# Patient Record
Sex: MALE | Race: WHITE | NOT HISPANIC OR LATINO | Employment: FULL TIME | URBAN - METROPOLITAN AREA
[De-identification: names, ages, dates, MRNs, and addresses within clinical notes are randomized per-mention and may not be internally consistent; named-entity substitution may affect disease eponyms.]

---

## 2017-04-01 DIAGNOSIS — E78.5 HYPERLIPIDEMIA: ICD-10-CM

## 2017-04-01 RX ORDER — ATORVASTATIN CALCIUM 20 MG/1
TABLET, FILM COATED ORAL
Qty: 90 TABLET | Refills: 3 | Status: SHIPPED | OUTPATIENT
Start: 2017-04-01 | End: 2018-04-22 | Stop reason: SDUPTHER

## 2018-04-20 DIAGNOSIS — E78.5 HYPERLIPIDEMIA: ICD-10-CM

## 2018-04-22 RX ORDER — ATORVASTATIN CALCIUM 20 MG/1
TABLET, FILM COATED ORAL
Qty: 90 TABLET | Refills: 2 | Status: SHIPPED | OUTPATIENT
Start: 2018-04-22 | End: 2019-09-19 | Stop reason: SDUPTHER

## 2018-09-13 DIAGNOSIS — I10 ESSENTIAL HYPERTENSION: ICD-10-CM

## 2018-09-13 RX ORDER — AMLODIPINE BESYLATE 10 MG/1
TABLET ORAL
Qty: 30 TABLET | Refills: 11 | OUTPATIENT
Start: 2018-09-13

## 2018-10-24 ENCOUNTER — OFFICE VISIT (OUTPATIENT)
Dept: FAMILY MEDICINE | Facility: CLINIC | Age: 58
End: 2018-10-24
Payer: COMMERCIAL

## 2018-10-24 ENCOUNTER — LAB VISIT (OUTPATIENT)
Dept: LAB | Facility: HOSPITAL | Age: 58
End: 2018-10-24
Attending: NURSE PRACTITIONER
Payer: COMMERCIAL

## 2018-10-24 VITALS
TEMPERATURE: 98 F | DIASTOLIC BLOOD PRESSURE: 104 MMHG | WEIGHT: 233.94 LBS | HEIGHT: 73 IN | BODY MASS INDEX: 31 KG/M2 | HEART RATE: 89 BPM | SYSTOLIC BLOOD PRESSURE: 164 MMHG

## 2018-10-24 DIAGNOSIS — E78.5 HYPERLIPIDEMIA, UNSPECIFIED HYPERLIPIDEMIA TYPE: ICD-10-CM

## 2018-10-24 DIAGNOSIS — I10 ESSENTIAL HYPERTENSION: ICD-10-CM

## 2018-10-24 DIAGNOSIS — J45.30 MILD PERSISTENT ASTHMA WITHOUT COMPLICATION: ICD-10-CM

## 2018-10-24 DIAGNOSIS — E66.9 CLASS 1 OBESITY WITH BODY MASS INDEX (BMI) OF 30.0 TO 30.9 IN ADULT, UNSPECIFIED OBESITY TYPE, UNSPECIFIED WHETHER SERIOUS COMORBIDITY PRESENT: ICD-10-CM

## 2018-10-24 DIAGNOSIS — Z00.00 ANNUAL PHYSICAL EXAM: Primary | ICD-10-CM

## 2018-10-24 LAB
ALBUMIN SERPL BCP-MCNC: 4 G/DL
ALP SERPL-CCNC: 99 U/L
ALT SERPL W/O P-5'-P-CCNC: 28 U/L
ANION GAP SERPL CALC-SCNC: 10 MMOL/L
AST SERPL-CCNC: 22 U/L
BASOPHILS # BLD AUTO: 0.09 K/UL
BASOPHILS NFR BLD: 0.9 %
BILIRUB SERPL-MCNC: 1.1 MG/DL
BUN SERPL-MCNC: 15 MG/DL
CALCIUM SERPL-MCNC: 9.8 MG/DL
CHLORIDE SERPL-SCNC: 103 MMOL/L
CHOLEST SERPL-MCNC: 216 MG/DL
CHOLEST/HDLC SERPL: 4.7 {RATIO}
CO2 SERPL-SCNC: 27 MMOL/L
CREAT SERPL-MCNC: 0.9 MG/DL
DIFFERENTIAL METHOD: NORMAL
EOSINOPHIL # BLD AUTO: 0.1 K/UL
EOSINOPHIL NFR BLD: 1.3 %
ERYTHROCYTE [DISTWIDTH] IN BLOOD BY AUTOMATED COUNT: 12.4 %
EST. GFR  (AFRICAN AMERICAN): >60 ML/MIN/1.73 M^2
EST. GFR  (NON AFRICAN AMERICAN): >60 ML/MIN/1.73 M^2
GLUCOSE SERPL-MCNC: 106 MG/DL
HCT VFR BLD AUTO: 48.8 %
HDLC SERPL-MCNC: 46 MG/DL
HDLC SERPL: 21.3 %
HGB BLD-MCNC: 16.5 G/DL
IMM GRANULOCYTES # BLD AUTO: 0.03 K/UL
IMM GRANULOCYTES NFR BLD AUTO: 0.3 %
LDLC SERPL CALC-MCNC: 133.8 MG/DL
LYMPHOCYTES # BLD AUTO: 1.9 K/UL
LYMPHOCYTES NFR BLD: 18.6 %
MCH RBC QN AUTO: 30.9 PG
MCHC RBC AUTO-ENTMCNC: 33.8 G/DL
MCV RBC AUTO: 91 FL
MONOCYTES # BLD AUTO: 0.7 K/UL
MONOCYTES NFR BLD: 6.8 %
NEUTROPHILS # BLD AUTO: 7.3 K/UL
NEUTROPHILS NFR BLD: 72.1 %
NONHDLC SERPL-MCNC: 170 MG/DL
NRBC BLD-RTO: 0 /100 WBC
PLATELET # BLD AUTO: 311 K/UL
PMV BLD AUTO: 10.3 FL
POTASSIUM SERPL-SCNC: 3.7 MMOL/L
PROT SERPL-MCNC: 7.5 G/DL
RBC # BLD AUTO: 5.34 M/UL
SODIUM SERPL-SCNC: 140 MMOL/L
TRIGL SERPL-MCNC: 181 MG/DL
WBC # BLD AUTO: 10.12 K/UL

## 2018-10-24 PROCEDURE — 90686 IIV4 VACC NO PRSV 0.5 ML IM: CPT | Mod: S$GLB,,, | Performed by: NURSE PRACTITIONER

## 2018-10-24 PROCEDURE — 99999 PR PBB SHADOW E&M-EST. PATIENT-LVL IV: CPT | Mod: PBBFAC,,, | Performed by: NURSE PRACTITIONER

## 2018-10-24 PROCEDURE — 90471 IMMUNIZATION ADMIN: CPT | Mod: S$GLB,,, | Performed by: NURSE PRACTITIONER

## 2018-10-24 PROCEDURE — 80061 LIPID PANEL: CPT

## 2018-10-24 PROCEDURE — 99396 PREV VISIT EST AGE 40-64: CPT | Mod: 25,S$GLB,, | Performed by: NURSE PRACTITIONER

## 2018-10-24 PROCEDURE — 80053 COMPREHEN METABOLIC PANEL: CPT

## 2018-10-24 PROCEDURE — 3077F SYST BP >= 140 MM HG: CPT | Mod: CPTII,S$GLB,, | Performed by: NURSE PRACTITIONER

## 2018-10-24 PROCEDURE — 85025 COMPLETE CBC W/AUTO DIFF WBC: CPT

## 2018-10-24 PROCEDURE — 3080F DIAST BP >= 90 MM HG: CPT | Mod: CPTII,S$GLB,, | Performed by: NURSE PRACTITIONER

## 2018-10-24 PROCEDURE — 36415 COLL VENOUS BLD VENIPUNCTURE: CPT | Mod: PO

## 2018-10-24 RX ORDER — AMLODIPINE BESYLATE 10 MG/1
TABLET ORAL
Qty: 30 TABLET | Refills: 5 | Status: SHIPPED | OUTPATIENT
Start: 2018-10-24 | End: 2021-06-07

## 2018-10-24 NOTE — PATIENT INSTRUCTIONS

## 2018-10-24 NOTE — PROGRESS NOTES
Subjective:       Patient ID: Jaden Greene is a 58 y.o. male.    Chief Complaint: Annual Exam    Mr. Greene presents to the clinic today for annual exam.  He has history of hypertension and has been out of amlodipine for one week.  He is not eating a low sodium diet or exercising.  He is obese, has lost 7 lb since last visit.  He denies headaches.  In the past has had chest pain, stabbing, to right side of chest lasting 2 minutes but none recently. No shortness of breath.  He has history of asthma which is well controlled.  He is due for flu shot.      Review of Systems   Constitutional: Negative for chills and fever.   HENT: Negative for congestion, ear pain and sinus pressure.    Eyes: Negative for visual disturbance.   Respiratory: Negative for cough, shortness of breath and wheezing.    Cardiovascular: Positive for chest pain (atypical, none recently). Negative for palpitations and leg swelling.   Gastrointestinal: Negative for abdominal pain, constipation and diarrhea.   Neurological: Negative for dizziness and headaches.   Psychiatric/Behavioral: Negative for dysphoric mood. The patient is nervous/anxious (occasional).        Objective:      Physical Exam   Constitutional: He is oriented to person, place, and time. He appears well-developed and well-nourished. No distress.   HENT:   Head: Normocephalic and atraumatic.   Right Ear: External ear normal.   Left Ear: External ear normal.   Mouth/Throat: Oropharynx is clear and moist. No oropharyngeal exudate.   Eyes: Pupils are equal, round, and reactive to light. Right eye exhibits no discharge. Left eye exhibits no discharge.   Neck: Neck supple. No thyromegaly present.   Cardiovascular: Normal rate and regular rhythm. Exam reveals no gallop and no friction rub.   No murmur heard.  Pulmonary/Chest: Effort normal and breath sounds normal. No respiratory distress. He has no wheezes. He has no rales.   Abdominal: Soft. He exhibits no distension. There is no  tenderness.   Lymphadenopathy:     He has no cervical adenopathy.   Neurological: He is alert and oriented to person, place, and time. Coordination normal.   Skin: Skin is warm and dry.   Psychiatric: He has a normal mood and affect. His behavior is normal. Thought content normal.   Vitals reviewed.          Current Outpatient Medications:     albuterol (PROAIR HFA) 90 mcg/actuation inhaler, Inhale 2 puffs into the lungs every 4 (four) hours as needed. PRN, Disp: 1 Inhaler, Rfl: 11    amLODIPine (NORVASC) 10 MG tablet, Start if blood pressure over 140 or over 95., Disp: 30 tablet, Rfl: 5    atorvastatin (LIPITOR) 20 MG tablet, TAKE ONE TABLET BY MOUTH ONCE DAILY, Disp: 90 tablet, Rfl: 2    fluticasone-vilanterol (BREO ELLIPTA) 200-25 mcg/dose DsDv diskus inhaler, Inhale 1 puff into the lungs once daily., Disp: 1 each, Rfl: 11    montelukast (SINGULAIR) 10 mg tablet, TAKE ONE TABLET BY MOUTH DAILY IN THE EVENING, Disp: 30 tablet, Rfl: 11    omeprazole (PRILOSEC) 40 MG capsule, Take 1 capsule (40 mg total) by mouth once daily., Disp: 30 capsule, Rfl: 11    benzonatate (TESSALON) 100 MG capsule, Take 100 mg by mouth 3 (three) times daily as needed for Cough., Disp: , Rfl:     dextromethorphan-guaifenesin  mg (MUCINEX DM)  mg per 12 hr tablet, Take 1 tablet by mouth 2 (two) times daily., Disp: , Rfl:     hydrocodone-acetaminophen 5-325mg (NORCO) 5-325 mg per tablet, Take 1 tablet by mouth every 4 (four) hours as needed (cough)., Disp: 100 tablet, Rfl: 0  Assessment:       1. Annual physical exam    2. Essential hypertension    3. Mild persistent asthma without complication    4. Hyperlipidemia, unspecified hyperlipidemia type    5. Class 1 obesity with body mass index (BMI) of 30.0 to 30.9 in adult, unspecified obesity type, unspecified whether serious comorbidity present        Plan:       Annual physical exam  Fasting labs.    Essential hypertension  Uncontrolled due to being out of  medication.  Walk for 30 min three times weekly.  DASH diet.  Handout given.  Resume amlodipine, RTC 2 weeks with me, 6 mos with PCP.  -     CBC auto differential; Future; Expected date: 10/24/2018  -     Comprehensive metabolic panel; Future; Expected date: 10/24/2018  -     amLODIPine (NORVASC) 10 MG tablet; Start if blood pressure over 140 or over 95.  Dispense: 30 tablet; Refill: 5    Mild persistent asthma without complication  Stable, continue current medication.  -     CBC auto differential; Future; Expected date: 10/24/2018    Hyperlipidemia, unspecified hyperlipidemia type  Due for lab.  -     CBC auto differential; Future; Expected date: 10/24/2018  -     Lipid panel; Future; Expected date: 10/24/2018    Obesity  See below.    Patient readiness: acceptance and barriers:none    During the course of the visit the patient was educated and counseled about the following:     Hypertension:   Medication: no change.  Dietary sodium restriction.  Regular aerobic exercise.  Obesity:   Diet interventions: moderate (500 kCal/d) deficit diet and qualitative changes (increase low-fat,  high-fiber foods).  Regular aerobic exercise program discussed.    Goals: Hypertension: Reduce Blood Pressure and Obesity: Reduce calorie intake and BMI    Did patient meet goals/outcomes: No    The following self management tools provided: declined    Patient Instructions (the written plan) was given to the patient/family.     Time spent with patient: 30 minutes    Barriers to medications present (no )    Adverse reactions to current medications (no)    Over the counter medications reviewed (No)

## 2018-10-26 ENCOUNTER — TELEPHONE (OUTPATIENT)
Dept: FAMILY MEDICINE | Facility: CLINIC | Age: 58
End: 2018-10-26

## 2018-10-26 NOTE — TELEPHONE ENCOUNTER
----- Message from Erin Estevez sent at 10/26/2018 12:47 PM CDT -----  Contact: Patient  Type:  Patient Returning Call    Who Called:  Patient  Who Left Message for Patient:    Does the patient know what this is regarding?:  Results  Best Call Back Number:    Additional Information:

## 2018-11-07 ENCOUNTER — OFFICE VISIT (OUTPATIENT)
Dept: FAMILY MEDICINE | Facility: CLINIC | Age: 58
End: 2018-11-07
Payer: COMMERCIAL

## 2018-11-07 VITALS
HEIGHT: 73 IN | TEMPERATURE: 98 F | SYSTOLIC BLOOD PRESSURE: 130 MMHG | HEART RATE: 87 BPM | WEIGHT: 241.19 LBS | DIASTOLIC BLOOD PRESSURE: 90 MMHG | BODY MASS INDEX: 31.96 KG/M2

## 2018-11-07 DIAGNOSIS — J45.30 MILD PERSISTENT ASTHMA WITHOUT COMPLICATION: ICD-10-CM

## 2018-11-07 DIAGNOSIS — I10 ESSENTIAL HYPERTENSION: Primary | ICD-10-CM

## 2018-11-07 DIAGNOSIS — E66.9 CLASS 1 OBESITY WITH BODY MASS INDEX (BMI) OF 30.0 TO 30.9 IN ADULT, UNSPECIFIED OBESITY TYPE, UNSPECIFIED WHETHER SERIOUS COMORBIDITY PRESENT: ICD-10-CM

## 2018-11-07 DIAGNOSIS — F41.9 ANXIETY: ICD-10-CM

## 2018-11-07 PROCEDURE — 99999 PR PBB SHADOW E&M-EST. PATIENT-LVL IV: CPT | Mod: PBBFAC,,, | Performed by: NURSE PRACTITIONER

## 2018-11-07 PROCEDURE — 99214 OFFICE O/P EST MOD 30 MIN: CPT | Mod: S$GLB,,, | Performed by: NURSE PRACTITIONER

## 2018-11-07 PROCEDURE — 3080F DIAST BP >= 90 MM HG: CPT | Mod: CPTII,S$GLB,, | Performed by: NURSE PRACTITIONER

## 2018-11-07 PROCEDURE — 3008F BODY MASS INDEX DOCD: CPT | Mod: CPTII,S$GLB,, | Performed by: NURSE PRACTITIONER

## 2018-11-07 PROCEDURE — 3075F SYST BP GE 130 - 139MM HG: CPT | Mod: CPTII,S$GLB,, | Performed by: NURSE PRACTITIONER

## 2018-11-07 RX ORDER — MONTELUKAST SODIUM 10 MG/1
10 TABLET ORAL NIGHTLY
Qty: 30 TABLET | Refills: 11 | Status: SHIPPED | OUTPATIENT
Start: 2018-11-07 | End: 2018-11-29 | Stop reason: SDUPTHER

## 2018-11-07 RX ORDER — CHLORTHALIDONE 25 MG/1
25 TABLET ORAL DAILY
Qty: 30 TABLET | Refills: 11 | Status: SHIPPED | OUTPATIENT
Start: 2018-11-07 | End: 2019-12-09 | Stop reason: SDUPTHER

## 2018-11-07 NOTE — PROGRESS NOTES
Subjective:       Patient ID: Jaden Greene is a 58 y.o. male.    Chief Complaint: Hypertension (2 week)    Mr. Greene presents to the clinic today for follow up for hypertension.  Blood pressure is not at goal and he has not been checking BP at home due to no cuff.  He is taking medication as prescribed.  Does not eat a low sodium diet.  He gets occasional anxiety (once per week) which lasts 10-15 minutes and is resolved with thinking about something else.  No panic attack symptoms.      Review of Systems   Constitutional: Negative for chills and fever.   Respiratory: Negative for cough and shortness of breath.    Cardiovascular: Negative for chest pain, palpitations and leg swelling.   Neurological: Negative for dizziness and headaches.   Psychiatric/Behavioral: The patient is nervous/anxious (occasional, once per week, lasts 10 min).        Objective:      Physical Exam   Constitutional: He is oriented to person, place, and time. He appears well-developed and well-nourished. No distress.   HENT:   Head: Normocephalic and atraumatic.   Right Ear: External ear normal.   Left Ear: External ear normal.   Eyes: Right eye exhibits no discharge. Left eye exhibits no discharge.   Neck: Neck supple. No thyromegaly present.   Cardiovascular: Normal rate and regular rhythm. Exam reveals no gallop and no friction rub.   No murmur heard.  Pulmonary/Chest: Effort normal and breath sounds normal. No respiratory distress. He has no wheezes. He has no rales.   Lymphadenopathy:     He has no cervical adenopathy.   Neurological: He is alert and oriented to person, place, and time. Coordination normal.   Skin: Skin is warm and dry.   Psychiatric: He has a normal mood and affect. His behavior is normal. Thought content normal.   Vitals reviewed.          Current Outpatient Medications:     albuterol (PROAIR HFA) 90 mcg/actuation inhaler, Inhale 2 puffs into the lungs every 4 (four) hours as needed. PRN, Disp: 1 Inhaler, Rfl:  11    amLODIPine (NORVASC) 10 MG tablet, Start if blood pressure over 140 or over 95., Disp: 30 tablet, Rfl: 5    atorvastatin (LIPITOR) 20 MG tablet, TAKE ONE TABLET BY MOUTH ONCE DAILY, Disp: 90 tablet, Rfl: 2    benzonatate (TESSALON) 100 MG capsule, Take 100 mg by mouth 3 (three) times daily as needed for Cough., Disp: , Rfl:     dextromethorphan-guaifenesin  mg (MUCINEX DM)  mg per 12 hr tablet, Take 1 tablet by mouth 2 (two) times daily., Disp: , Rfl:     fluticasone-vilanterol (BREO ELLIPTA) 200-25 mcg/dose DsDv diskus inhaler, Inhale 1 puff into the lungs once daily., Disp: 1 each, Rfl: 11    hydrocodone-acetaminophen 5-325mg (NORCO) 5-325 mg per tablet, Take 1 tablet by mouth every 4 (four) hours as needed (cough)., Disp: 100 tablet, Rfl: 0    montelukast (SINGULAIR) 10 mg tablet, Take 1 tablet (10 mg total) by mouth every evening., Disp: 30 tablet, Rfl: 11    chlorthalidone (HYGROTEN) 25 MG Tab, Take 1 tablet (25 mg total) by mouth once daily., Disp: 30 tablet, Rfl: 11    omeprazole (PRILOSEC) 40 MG capsule, Take 1 capsule (40 mg total) by mouth once daily., Disp: 30 capsule, Rfl: 11  Assessment:       1. Essential hypertension    2. Mild persistent asthma without complication    3. Class 1 obesity with body mass index (BMI) of 30.0 to 30.9 in adult, unspecified obesity type, unspecified whether serious comorbidity present    4. Anxiety        Plan:       Essential hypertension  Get BP cuff.  Add:  -     chlorthalidone (HYGROTEN) 25 MG Tab; Take 1 tablet (25 mg total) by mouth once daily.  Dispense: 30 tablet; Refill: 11  DASH diet, weight loss.  RTC 1 month.    Mild persistent asthma without complication  Stable, needs refill.  -     montelukast (SINGULAIR) 10 mg tablet; Take 1 tablet (10 mg total) by mouth every evening.  Dispense: 30 tablet; Refill: 11    Class 1 obesity with body mass index (BMI) of 30.0 to 30.9 in adult, unspecified obesity type, unspecified whether serious  comorbidity present  See below.    Anxiety  This is occasional and mild.  Meditation and deep breathing exercises can help.  Download Calm or Headspace alejandro.     Patient readiness: acceptance and barriers:none    During the course of the visit the patient was educated and counseled about the following:     Hypertension:   Medication: begin chlorthalidone.  Dietary sodium restriction.  Regular aerobic exercise.  Obesity:   Diet interventions: qualitative changes (increase low-fat,  high-fiber foods).  Regular aerobic exercise program discussed.    Goals: Hypertension: Reduce Blood Pressure and Obesity: Reduce calorie intake and BMI    Did patient meet goals/outcomes: No    The following self management tools provided: declined    Patient Instructions (the written plan) was given to the patient/family.     Time spent with patient: 15 minutes    Barriers to medications present (no )    Adverse reactions to current medications (no)    Over the counter medications reviewed (No)

## 2018-11-07 NOTE — PATIENT INSTRUCTIONS
Apps to try for anxiety:  Headspace and Calm      Low-Salt Diet  This diet removes foods that are high in salt. It also limits the amount of salt you use when cooking. It is most often used for people with high blood pressure, edema (fluid retention), and kidney, liver, or heart disease.  Table salt contains the mineral sodium. Your body needs sodium to work normally. But too much sodium can make your health problems worse. Your healthcare provider is recommending a low-salt (also called low-sodium) diet for you. Your total daily allowance of salt is 1,500 to 2,300 milligrams (mg). It is less than 1 teaspoon of table salt. This means you can have only about 500 to 700 mg of sodium at each meal. People with certain health problems should limit salt intake to the lower end of the recommended range.    When you cook, dont add much salt. If you can cook without using salt, even better. Dont add salt to your food at the table.  When shopping, read food labels. Salt is often called sodium on the label. Choose foods that are salt-free, low salt, or very low salt. Note that foods with reduced salt may not lower your salt intake enough.    Beans, potatoes, and pasta  Ok: Dry beans, split peas, lentils, potatoes, rice, macaroni, pasta, spaghetti without added salt  Avoid: Potato chips, tortilla chips, and similar products  Breads and cereals  Ok: Low-sodium breads, rolls, cereals, and cakes; low-salt crackers, matzo crackers  Avoid: Salted crackers, pretzels, popcorn, Armenian toast, pancakes, muffins  Dairy  Ok: Milk, chocolate milk, hot chocolate mix, low-salt cheeses, and yogurt  Avoid: Processed cheese and cheese spreads; Roquefort, Camembert, and cottage cheese; buttermilk, instant breakfast drink  Desserts  Ok: Ice cream, frozen yogurt, juice bars, gelatin, cookies and pies, sugar, honey, jelly, hard candy  Avoid: Most pies, cakes and cookies prepared or processed with salt; instant pudding  Drinks  Ok: Tea, coffee,  fizzy (carbonated) drinks, juices  Avoid: Flavored coffees, electrolyte replacement drinks, sports drinks  Meats  Ok: All fresh meat, fish, poultry, low-salt tuna, eggs, egg substitute  Avoid: Smoked, pickled, brine-cured, or salted meats and fish. This includes barrera, chipped beef, corned beef, hot dogs, deli meats, ham, kosher meats, salt pork, sausage, canned tuna, salted codfish, smoked salmon, herring, sardines, or anchovies.  Seasonings and spices  Ok: Most seasonings are okay. Good substitutes for salt include: fresh herb blends, hot sauce, lemon, garlic, chilel, vinegar, dry mustard, parsley, cilantro, horseradish, tomato paste, regular margarine, mayonnaise, unsalted butter, cream cheese, vegetable oil, cream, low-salt salad dressing and gravy.  Avoid: Regular ketchup, relishes, pickles, soy sauce, teriyaki sauce, Worcestershire sauce, BBQ sauce, tartar sauce, meat tenderizer, chili sauce, regular gravy, regular salad dressing, salted butter  Soups  Ok: Low-salt soups and broths made with allowed foods  Avoid: Bouillon cubes, soups with smoked or salted meats, regular soup and broth  Vegetables  Ok: Most vegetables are okay; also low-salt tomato and vegetable juices  Avoid: Sauerkraut and other brine-soaked vegetables; pickles and other pickled vegetables; tomato juice, olives  Date Last Reviewed: 8/1/2016  © 7310-7501 [employer]. [address] [place] [address]. All rights reserved. This information is not intended as a substitute for professional medical care. Always follow your healthcare professional's instructions.

## 2018-11-13 DIAGNOSIS — J40 BRONCHITIS: ICD-10-CM

## 2018-11-13 DIAGNOSIS — J45.901 ASTHMA ATTACK: ICD-10-CM

## 2018-11-13 RX ORDER — ALBUTEROL SULFATE 90 UG/1
2 AEROSOL, METERED RESPIRATORY (INHALATION) EVERY 4 HOURS PRN
Qty: 1 INHALER | Refills: 11 | Status: SHIPPED | OUTPATIENT
Start: 2018-11-13 | End: 2018-11-29 | Stop reason: SDUPTHER

## 2018-11-13 NOTE — TELEPHONE ENCOUNTER
----- Message from Liz Mendoza sent at 11/13/2018  9:47 AM CST -----  Contact: self  Type:  RX Refill Request    Who Called:  self  Refill or New Rx:  refill  RX Name and Strength:  albuterol (PROAIR HFA) 90 mcg/actuation inhaler  How is the patient currently taking it? (ex. 1XDay):    Is this a 30 day or 90 day RX:  30 with refill  Preferred [facility] with phone number:  [facility]  Local or Mail Order:  local  Ordering Provider:  Dr Reinaldo Castaneda Call Back Number:  [phone]  Additional Information:  Patient did set an appointment for 11/29/18 but is out of medication. Please call patient. Thanks!    [id number] - [place] - [address]  [address] [address]  Phone: [phone] Fax: [phone]

## 2018-11-29 ENCOUNTER — OFFICE VISIT (OUTPATIENT)
Dept: PULMONOLOGY | Facility: CLINIC | Age: 58
End: 2018-11-29
Payer: COMMERCIAL

## 2018-11-29 VITALS
BODY MASS INDEX: 29.63 KG/M2 | HEART RATE: 84 BPM | HEIGHT: 73 IN | DIASTOLIC BLOOD PRESSURE: 83 MMHG | OXYGEN SATURATION: 97 % | SYSTOLIC BLOOD PRESSURE: 131 MMHG | WEIGHT: 223.56 LBS

## 2018-11-29 DIAGNOSIS — J45.20 MILD INTERMITTENT ASTHMA WITHOUT COMPLICATION: Primary | ICD-10-CM

## 2018-11-29 PROCEDURE — 99999 PR PBB SHADOW E&M-EST. PATIENT-LVL III: CPT | Mod: PBBFAC,,, | Performed by: INTERNAL MEDICINE

## 2018-11-29 PROCEDURE — 3008F BODY MASS INDEX DOCD: CPT | Mod: CPTII,S$GLB,, | Performed by: INTERNAL MEDICINE

## 2018-11-29 PROCEDURE — 99213 OFFICE O/P EST LOW 20 MIN: CPT | Mod: S$GLB,,, | Performed by: INTERNAL MEDICINE

## 2018-11-29 PROCEDURE — 3079F DIAST BP 80-89 MM HG: CPT | Mod: CPTII,S$GLB,, | Performed by: INTERNAL MEDICINE

## 2018-11-29 PROCEDURE — 3075F SYST BP GE 130 - 139MM HG: CPT | Mod: CPTII,S$GLB,, | Performed by: INTERNAL MEDICINE

## 2018-11-29 RX ORDER — ALBUTEROL SULFATE 90 UG/1
2 AEROSOL, METERED RESPIRATORY (INHALATION) EVERY 4 HOURS PRN
Qty: 1 INHALER | Refills: 11 | Status: SHIPPED | OUTPATIENT
Start: 2018-11-29 | End: 2022-01-04 | Stop reason: SDUPTHER

## 2018-11-29 RX ORDER — MONTELUKAST SODIUM 10 MG/1
10 TABLET ORAL NIGHTLY
Qty: 30 TABLET | Refills: 11 | Status: SHIPPED | OUTPATIENT
Start: 2018-11-29 | End: 2019-12-09 | Stop reason: SDUPTHER

## 2018-11-29 RX ORDER — BUDESONIDE AND FORMOTEROL FUMARATE DIHYDRATE 160; 4.5 UG/1; UG/1
2 AEROSOL RESPIRATORY (INHALATION) EVERY 12 HOURS
Qty: 1 INHALER | Refills: 11 | Status: SHIPPED | OUTPATIENT
Start: 2018-11-29 | End: 2021-09-05 | Stop reason: ALTCHOICE

## 2018-11-29 RX ORDER — PREDNISONE 20 MG/1
TABLET ORAL
Qty: 6 TABLET | Refills: 0 | Status: SHIPPED | OUTPATIENT
Start: 2018-11-29 | End: 2019-11-22

## 2018-11-29 NOTE — PROGRESS NOTES
11/29/2018    Jaden Greene  Office Note    Chief Complaint   Patient presents with    Follow-up     1 year       HPI: 11/29/18- Asthma feels is controlled, no prednisone use in over a year,   SOB- occasional with exertion, 1x monthly, relieved with Albuterol inhaler, no nocturnal arousals,  No cough, sinuses do not bother him    7/14/16- cough better-no prednisone, used hydrocodone nightly,   bp soared offf lisinopril but insurance physical bp good.  No noctural arousal, rescue 1-2/day.  Control felt to be good.  Clear mucous.     gerd good.         The chief compliant  problem is stable  PFSH:  Past Medical History:   Diagnosis Date    Asthma     Hyperlipidemia     Hypertension          Past Surgical History:   Procedure Laterality Date    COLONOSCOPY N/A 7/21/2016    Procedure: COLONOSCOPY;  Surgeon: Jaden Vila MD;  Location: [facility];  Service: Endoscopy;  Laterality: N/A;    COLONOSCOPY N/A 7/21/2016    Performed by Jaden Vila MD at [facility]     Social History     Tobacco Use    Smoking status: Never Smoker    Smokeless tobacco: Never Used   Substance Use Topics    Alcohol use: No    Drug use: No     Family History   Problem Relation Age of Onset    Hypertension Mother     Heart disease Father     Diabetes Brother         adult onset    Birth defects Maternal Aunt         colon     Review of patient's allergies indicates:   Allergen Reactions    Lisinopril Other (See Comments)     cough     I have reviewed past medical, family, and social history. I have reviewed previous nurse notes.    Performance Status:The patient's activity level is no limits with regular activity.          Review of Systems   Constitutional: Negative for activity change, appetite change, chills, diaphoresis, fatigue, fever and unexpected weight change.   HENT: Negative for dental problem, postnasal drip, rhinorrhea, sinus pressure, sinus pain, sneezing, sore throat, trouble swallowing and voice change.   "  Respiratory: Negative for apnea, cough, chest tightness, shortness of breath, wheezing and stridor.    Cardiovascular: Negative for chest pain, palpitations and leg swelling.   Gastrointestinal: Negative for abdominal distention, abdominal pain, constipation and nausea.   Musculoskeletal: Negative for gait problem, myalgias and neck pain.   Skin: Negative for color change and pallor.   Allergic/Immunologic: Negative for environmental allergies and food allergies.   Neurological: Negative for dizziness, speech difficulty, weakness, light-headedness, numbness and headaches.   Hematological: Negative for adenopathy. Does not bruise/bleed easily.   Psychiatric/Behavioral: Negative for dysphoric mood and sleep disturbance. The patient is not nervous/anxious.           Exam:Comprehensive exam done. /83 (BP Location: Right arm, Patient Position: Sitting)   Pulse 84   Ht 6' 1" (1.854 m)   Wt 101.4 kg (223 lb 8.7 oz)   SpO2 97% Comment: on room air  BMI 29.49 kg/m²   Exam included Vitals as listed  Constitutional: He is oriented to person, place, and time. He appears well-developed. No distress.   Nose: Nose normal.   Mouth/Throat: Uvula is midline, oropharynx is clear and moist and mucous membranes are normal. No dental caries. No oropharyngeal exudate, posterior oropharyngeal edema, posterior oropharyngeal erythema or tonsillar abscesses.  Mallapatti (M) score  Eyes: Pupils are equal, round, and reactive to light.   Neck: No JVD present. No thyromegaly present.   Cardiovascular: Normal rate, regular rhythm and normal heart sounds. Exam reveals no gallop and no friction rub.   No murmur heard.  Pulmonary/Chest: Effort normal and breath sounds normal. No accessory muscle usage or stridor. No apnea and no tachypnea. No respiratory distress, decreased breath sounds, wheezes, rhonchi, rales, or tenderness.   Abdominal: Soft. He exhibits no mass. There is no tenderness. No hepatosplenomegaly, hernias and normoactive " bowel sounds  Musculoskeletal: Normal range of motion. exhibits no edema.   Lymphadenopathy:     He has no cervical adenopathy.     He has no axillary adenopathy.   Neurological:  alert and oriented to person, place, and time. not disoriented.   Skin: Skin is warm and dry. Capillary refill takes less 2 sec. No cyanosis or erythema. No pallor. Nails show no clubbing.   Psychiatric: normal mood and affect. behavior is normal. Judgment and thought content normal.       Radiographs (ct chest and cxr) reviewed: results reviewed from 2016      Labs reviewed       Lab Results   Component Value Date    WBC 10.12 10/24/2018    RBC 5.34 10/24/2018    HGB 16.5 10/24/2018    HCT 48.8 10/24/2018    MCV 91 10/24/2018    MCH 30.9 10/24/2018    MCHC 33.8 10/24/2018    RDW 12.4 10/24/2018     10/24/2018    MPV 10.3 10/24/2018    GRAN 7.3 10/24/2018    GRAN 72.1 10/24/2018    LYMPH 1.9 10/24/2018    LYMPH 18.6 10/24/2018    MONO 0.7 10/24/2018    MONO 6.8 10/24/2018    EOS 0.1 10/24/2018    BASO 0.09 10/24/2018    EOSINOPHIL 1.3 10/24/2018    BASOPHIL 0.9 10/24/2018       PFT results not available pt cancelled appointment  Pulmonary Functions Testing Results:        Plan:  Clinical impression is apparently straight forward and impression with management as below.    Jaden was seen today for follow-up.    Diagnoses and all orders for this visit:    Mild intermittent asthma without complication  -     Complete PFT with bronchodilator; Future  -     albuterol (PROAIR HFA) 90 mcg/actuation inhaler; Inhale 2 puffs into the lungs every 4 (four) hours as needed. PRN  -     montelukast (SINGULAIR) 10 mg tablet; Take 1 tablet (10 mg total) by mouth every evening.  -     predniSONE (DELTASONE) 20 MG tablet; Take one pill a day for three days, repeat for shortness of breath  -     budesonide-formoterol 160-4.5 mcg (SYMBICORT) 160-4.5 mcg/actuation HFAA; Inhale 2 puffs into the lungs every 12 (twelve) hours. Controller        Follow-up in  about 1 year (around 11/29/2019), or if symptoms worsen or fail to improve.    Discussed with patient above for education the following:      Patient Instructions   Continue current Asthma medication  Stop Breo 200 1 puff every day  Start Symbicort 1-2 puff 1-2 times a day  Singulair 10 mg nightly    Asthma Action Plan  Prednisone 20 mg Take one pill a day for three days, repeat for shortness of breath (SOB)  Albuterol inhaler 2 puffs every 4 hours as needed for wheeze and SOB    Pulmonary function test at hospital, will need to do at least one to evaluate lung function

## 2018-11-29 NOTE — PATIENT INSTRUCTIONS
Continue current Asthma medication  Stop Breo 200 1 puff every day  Start Symbicort 1-2 puff 1-2 times a day  Singulair 10 mg nightly    Asthma Action Plan  Prednisone 20 mg Take one pill a day for three days, repeat for shortness of breath (SOB)  Albuterol inhaler 2 puffs every 4 hours as needed for wheeze and SOB    Pulmonary function test at hospital, will need to do at least one to evaluate lung function

## 2018-11-29 NOTE — PROGRESS NOTES
"11/29/2018    Jaden Greene  Office Note    Chief Complaint   Patient presents with    Follow-up     1 year       HPI:cough better-no prednisone, used hydrocodone nightly,   bp soared offf lisinopril but insurance physical bp good.  No noctural arousal, rescue 1-2/day.  Control felt to be good.  Clear mucous.    gerd good.    The chief compliant  problem is stable, follow up prior exacerbation.              PFSH:  Past Medical History:   Diagnosis Date    Asthma     Hyperlipidemia     Hypertension          Past Surgical History:   Procedure Laterality Date    COLONOSCOPY N/A 7/21/2016    Procedure: COLONOSCOPY;  Surgeon: Jaden Vila MD;  Location: [facility];  Service: Endoscopy;  Laterality: N/A;    COLONOSCOPY N/A 7/21/2016    Performed by Jaden Vila MD at [facility]     Social History     Tobacco Use    Smoking status: Never Smoker    Smokeless tobacco: Never Used   Substance Use Topics    Alcohol use: No    Drug use: No     Family History   Problem Relation Age of Onset    Hypertension Mother     Heart disease Father     Diabetes Brother         adult onset    Birth defects Maternal Aunt         colon     Review of patient's allergies indicates:   Allergen Reactions    Lisinopril Other (See Comments)     cough       Performance Status:The patient's activity level is activities of daily living only.    Review of Systems:  a review of ten systems covering constitutional, Eye, HEENT, Respiratory, Cardiac, GI, , Musculoskeletal, Endocrine, Dermatologicwas negative except for pertinent findings as listed ABOVE and below:all good today             Exam:Comprehensive exam done.   /83 (BP Location: Right arm, Patient Position: Sitting)   Pulse 84   Ht 6' 1" (1.854 m)   Wt 101.4 kg (223 lb 8.7 oz)   SpO2 97% Comment: on room air  BMI 29.49 kg/m²   Exam included Vitals as listed, and patient's appearance and affect and alertness and mood, oral exam for yeast and hygiene and pharynx " lesions and Mallapatti (M) score, neck with inspection for jvd and masses and thyroid abnormalities and lymph nodes (supraclavicular and infraclavicular nodes also examined and noted if abn), chest exam included symmetry and effort and fremitus and percussion and auscultation, cardiac exam included rhythm and gallops and murmur and rubs and jvd and edema, abdominal exam for mass and hepatosplenomegaly and tenderness and hernias and bowel sounds, Musculoskeletal exam with muscle tone and posture and mobility/gait and  strength, and skin for rashes and cyanosis and pallor and turgor, extremity for clubbing.  Findings were normal except as listed below:  M2 and faint wheezes    Radiographs reviewed: was not done     Labs was not done       PFT was not done    Plan:       Jaden was seen today for follow-up.    Diagnoses and all orders for this visit:    Mild persistent asthma without complication        No Follow-up on file.    Discussed with patient above for education the following:        Remember lisinopril caused cough.    Controller breo and singulair   Rescue albuterol   Action plan prednisone.

## 2019-01-24 ENCOUNTER — OFFICE VISIT (OUTPATIENT)
Dept: DERMATOLOGY | Facility: CLINIC | Age: 59
End: 2019-01-24
Payer: COMMERCIAL

## 2019-01-24 DIAGNOSIS — L82.1 SEBORRHEIC KERATOSES: ICD-10-CM

## 2019-01-24 DIAGNOSIS — D22.9 MULTIPLE BENIGN NEVI: ICD-10-CM

## 2019-01-24 DIAGNOSIS — L91.8 SKIN TAGS, MULTIPLE ACQUIRED: Primary | ICD-10-CM

## 2019-01-24 DIAGNOSIS — L81.4 SOLAR LENTIGO: ICD-10-CM

## 2019-01-24 PROCEDURE — 99999 PR PBB SHADOW E&M-EST. PATIENT-LVL II: CPT | Mod: PBBFAC,,, | Performed by: DERMATOLOGY

## 2019-01-24 PROCEDURE — 99202 PR OFFICE/OUTPT VISIT, NEW, LEVL II, 15-29 MIN: ICD-10-PCS | Mod: S$GLB,,, | Performed by: DERMATOLOGY

## 2019-01-24 PROCEDURE — 99202 OFFICE O/P NEW SF 15 MIN: CPT | Mod: S$GLB,,, | Performed by: DERMATOLOGY

## 2019-01-24 PROCEDURE — 99999 PR PBB SHADOW E&M-EST. PATIENT-LVL II: ICD-10-PCS | Mod: PBBFAC,,, | Performed by: DERMATOLOGY

## 2019-01-24 NOTE — PROGRESS NOTES
Subjective:       Patient ID:  Jaden Greene is a 58 y.o. male who presents for   Chief Complaint   Patient presents with    Skin Tags     Face and axilla    Skin Check     UBSE     initial visit  No h/o skin cancer or lesion biopsied  Denies h/o intense sun exposure   Few sunburns, fair skin  No FH melanoma        Skin Tags  - Initial  Affected locations: face, left axilla and right axilla  Signs / symptoms: asymptomatic  Severity: mild  Timing: constant  Aggravated by: nothing  Relieving factors/Treatments tried: nothing  Improvement on treatment: no relief        Review of Systems   Constitutional: Negative for fever, chills and fatigue.   Skin: Positive for activity-related sunscreen use. Negative for daily sunscreen use and wears hat.   Hematologic/Lymphatic: Does not bruise/bleed easily.        Objective:    Physical Exam   Constitutional: He appears well-developed and well-nourished. No distress.   Neurological: He is alert and oriented to person, place, and time. He is not disoriented.   Psychiatric: He has a normal mood and affect.   Skin:   Areas Examined (abnormalities noted in diagram):   Scalp / Hair Palpated and Inspected  Head / Face Inspection Performed  Neck Inspection Performed  Chest / Axilla Inspection Performed  Abdomen Inspection Performed  Back Inspection Performed  RUE Inspected  LUE Inspection Performed  Nails and Digits Inspection Performed                       Diagram Legend     Erythematous scaling macule/papule c/w actinic keratosis       Vascular papule c/w angioma      Pigmented verrucoid papule/plaque c/w seborrheic keratosis      Yellow umbilicated papule c/w sebaceous hyperplasia      Irregularly shaped tan macule c/w lentigo     1-2 mm smooth white papules consistent with Milia      Movable subcutaneous cyst with punctum c/w epidermal inclusion cyst      Subcutaneous movable cyst c/w pilar cyst      Firm pink to brown papule c/w dermatofibroma      Pedunculated fleshy  papule(s) c/w skin tag(s)      Evenly pigmented macule c/w junctional nevus     Mildly variegated pigmented, slightly irregular-bordered macule c/w mildly atypical nevus      Flesh colored to evenly pigmented papule c/w intradermal nevus       Pink pearly papule/plaque c/w basal cell carcinoma      Erythematous hyperkeratotic cursted plaque c/w SCC      Surgical scar with no sign of skin cancer recurrence      Open and closed comedones      Inflammatory papules and pustules      Verrucoid papule consistent consistent with wart     Erythematous eczematous patches and plaques     Dystrophic onycholytic nail with subungual debris c/w onychomycosis     Umbilicated papule    Erythematous-base heme-crusted tan verrucoid plaque consistent with inflamed seborrheic keratosis     Erythematous Silvery Scaling Plaque c/w Psoriasis     See annotation      Assessment / Plan:        Skin tags, multiple acquired  Reassurance given to patient. No treatment is necessary.   Treatment of benign, asymptomatic lesions may be considered cosmetic.    Multiple benign nevi  Monitor for new mole or moles that are becoming bigger, darker, irritated, or developing irregular borders.     Solar lentigo  This is a benign hyperpigmented sun induced lesion. Daily sun protection will reduce the number of new lesions. Treatment of these benign lesions are considered cosmetic.    Seborrheic keratoses  These are benign inherited growths without a malignant potential. Reassurance given to patient. No treatment is necessary.     Patient instructed in importance in daily sun protection of at least spf 30. Mineral sunscreen ingredients preferred (Zinc +/- Titanium).   Recommend Elta MD for daily use on face and neck.  Patient encouraged to wear hat for all outdoor exposure.   Also discussed sun avoidance and use of protective clothing.           Follow-up if symptoms worsen or fail to improve.

## 2019-02-12 ENCOUNTER — OFFICE VISIT (OUTPATIENT)
Dept: FAMILY MEDICINE | Facility: CLINIC | Age: 59
End: 2019-02-12
Payer: COMMERCIAL

## 2019-02-12 VITALS
WEIGHT: 218 LBS | TEMPERATURE: 98 F | HEART RATE: 81 BPM | DIASTOLIC BLOOD PRESSURE: 91 MMHG | SYSTOLIC BLOOD PRESSURE: 141 MMHG | HEIGHT: 73 IN | BODY MASS INDEX: 28.89 KG/M2

## 2019-02-12 DIAGNOSIS — M79.10 MYALGIA: ICD-10-CM

## 2019-02-12 DIAGNOSIS — Z12.5 PROSTATE CANCER SCREENING: ICD-10-CM

## 2019-02-12 DIAGNOSIS — E78.2 MIXED HYPERLIPIDEMIA: ICD-10-CM

## 2019-02-12 DIAGNOSIS — J10.1 INFLUENZA DUE TO INFLUENZA A VIRUS: Primary | ICD-10-CM

## 2019-02-12 DIAGNOSIS — I10 ESSENTIAL HYPERTENSION: ICD-10-CM

## 2019-02-12 DIAGNOSIS — E66.3 OVERWEIGHT (BMI 25.0-29.9): ICD-10-CM

## 2019-02-12 DIAGNOSIS — J45.30 MILD PERSISTENT ASTHMA WITHOUT COMPLICATION: ICD-10-CM

## 2019-02-12 DIAGNOSIS — R06.2 DIFFUSE WHEEZING: ICD-10-CM

## 2019-02-12 LAB
INFLUENZA A, MOLECULAR: POSITIVE
INFLUENZA B, MOLECULAR: NEGATIVE
SPECIMEN SOURCE: ABNORMAL

## 2019-02-12 PROCEDURE — 99999 PR PBB SHADOW E&M-EST. PATIENT-LVL IV: CPT | Mod: PBBFAC,,, | Performed by: NURSE PRACTITIONER

## 2019-02-12 PROCEDURE — 3077F SYST BP >= 140 MM HG: CPT | Mod: CPTII,S$GLB,, | Performed by: NURSE PRACTITIONER

## 2019-02-12 PROCEDURE — 94640 AIRWAY INHALATION TREATMENT: CPT | Mod: S$GLB,,, | Performed by: NURSE PRACTITIONER

## 2019-02-12 PROCEDURE — 3077F PR MOST RECENT SYSTOLIC BLOOD PRESSURE >= 140 MM HG: ICD-10-PCS | Mod: CPTII,S$GLB,, | Performed by: NURSE PRACTITIONER

## 2019-02-12 PROCEDURE — 94640 PR INHAL RX, AIRWAY OBST/DX SPUTUM INDUCT: ICD-10-PCS | Mod: S$GLB,,, | Performed by: NURSE PRACTITIONER

## 2019-02-12 PROCEDURE — 87502 INFLUENZA DNA AMP PROBE: CPT | Mod: PO

## 2019-02-12 PROCEDURE — 3008F PR BODY MASS INDEX (BMI) DOCUMENTED: ICD-10-PCS | Mod: CPTII,S$GLB,, | Performed by: NURSE PRACTITIONER

## 2019-02-12 PROCEDURE — 96372 PR INJECTION,THERAP/PROPH/DIAG2ST, IM OR SUBCUT: ICD-10-PCS | Mod: 59,S$GLB,, | Performed by: NURSE PRACTITIONER

## 2019-02-12 PROCEDURE — 3008F BODY MASS INDEX DOCD: CPT | Mod: CPTII,S$GLB,, | Performed by: NURSE PRACTITIONER

## 2019-02-12 PROCEDURE — 3080F DIAST BP >= 90 MM HG: CPT | Mod: CPTII,S$GLB,, | Performed by: NURSE PRACTITIONER

## 2019-02-12 PROCEDURE — 99999 PR PBB SHADOW E&M-EST. PATIENT-LVL IV: ICD-10-PCS | Mod: PBBFAC,,, | Performed by: NURSE PRACTITIONER

## 2019-02-12 PROCEDURE — 3080F PR MOST RECENT DIASTOLIC BLOOD PRESSURE >= 90 MM HG: ICD-10-PCS | Mod: CPTII,S$GLB,, | Performed by: NURSE PRACTITIONER

## 2019-02-12 PROCEDURE — 96372 THER/PROPH/DIAG INJ SC/IM: CPT | Mod: 59,S$GLB,, | Performed by: NURSE PRACTITIONER

## 2019-02-12 PROCEDURE — 99214 OFFICE O/P EST MOD 30 MIN: CPT | Mod: 25,S$GLB,, | Performed by: NURSE PRACTITIONER

## 2019-02-12 PROCEDURE — 99214 PR OFFICE/OUTPT VISIT, EST, LEVL IV, 30-39 MIN: ICD-10-PCS | Mod: 25,S$GLB,, | Performed by: NURSE PRACTITIONER

## 2019-02-12 RX ORDER — BETAMETHASONE SODIUM PHOSPHATE AND BETAMETHASONE ACETATE 3; 3 MG/ML; MG/ML
6 INJECTION, SUSPENSION INTRA-ARTICULAR; INTRALESIONAL; INTRAMUSCULAR; SOFT TISSUE
Status: COMPLETED | OUTPATIENT
Start: 2019-02-12 | End: 2019-02-12

## 2019-02-12 RX ORDER — OSELTAMIVIR PHOSPHATE 75 MG/1
75 CAPSULE ORAL DAILY
Qty: 5 CAPSULE | Refills: 0 | Status: SHIPPED | OUTPATIENT
Start: 2019-02-12 | End: 2019-02-17

## 2019-02-12 RX ORDER — IPRATROPIUM BROMIDE AND ALBUTEROL SULFATE 2.5; .5 MG/3ML; MG/3ML
3 SOLUTION RESPIRATORY (INHALATION)
Status: COMPLETED | OUTPATIENT
Start: 2019-02-12 | End: 2019-02-12

## 2019-02-12 RX ADMIN — IPRATROPIUM BROMIDE AND ALBUTEROL SULFATE 3 ML: 2.5; .5 SOLUTION RESPIRATORY (INHALATION) at 09:02

## 2019-02-12 RX ADMIN — BETAMETHASONE SODIUM PHOSPHATE AND BETAMETHASONE ACETATE 6 MG: 3; 3 INJECTION, SUSPENSION INTRA-ARTICULAR; INTRALESIONAL; INTRAMUSCULAR; SOFT TISSUE at 09:02

## 2019-02-12 NOTE — PROGRESS NOTES
Subjective:       Patient ID: Jaden Greene is a 58 y.o. male.    Chief Complaint: Influenza (FLU like symptons); Fever; Generalized Body Aches; and Emesis    Patient presents today with complaints of subjective fever, body aches, nasal/chest congestion take started on Saturday. Patient wife reports daughter tested positive for Influenza yesterday. Patient reports taking over the counter decongestants.      URI    This is a new problem. The current episode started in the past 7 days. The problem has been gradually improving. The maximum temperature recorded prior to his arrival was 100.4 - 100.9 F. The fever has been present for 1 to 2 days. Associated symptoms include congestion (nasal/chest), coughing (yellow), rhinorrhea, sneezing and wheezing. Pertinent negatives include no diarrhea, ear pain, headaches, rash, sinus pain or sore throat. He has tried inhaler use, acetaminophen and NSAIDs for the symptoms. The treatment provided mild relief.       Past Medical History:   Diagnosis Date    Asthma     Hyperlipidemia     Hypertension        Review of patient's allergies indicates:   Allergen Reactions    Lisinopril Other (See Comments)     cough         Current Outpatient Medications:     albuterol (PROAIR HFA) 90 mcg/actuation inhaler, Inhale 2 puffs into the lungs every 4 (four) hours as needed. PRN, Disp: 1 Inhaler, Rfl: 11    amLODIPine (NORVASC) 10 MG tablet, Start if blood pressure over 140 or over 95., Disp: 30 tablet, Rfl: 5    atorvastatin (LIPITOR) 20 MG tablet, TAKE ONE TABLET BY MOUTH ONCE DAILY, Disp: 90 tablet, Rfl: 2    budesonide-formoterol 160-4.5 mcg (SYMBICORT) 160-4.5 mcg/actuation HFAA, Inhale 2 puffs into the lungs every 12 (twelve) hours. Controller, Disp: 1 Inhaler, Rfl: 11    chlorthalidone (HYGROTEN) 25 MG Tab, Take 1 tablet (25 mg total) by mouth once daily., Disp: 30 tablet, Rfl: 11    montelukast (SINGULAIR) 10 mg tablet, Take 1 tablet (10 mg total) by mouth every evening.,  "Disp: 30 tablet, Rfl: 11    predniSONE (DELTASONE) 20 MG tablet, Take one pill a day for three days, repeat for shortness of breath, Disp: 6 tablet, Rfl: 0    omeprazole (PRILOSEC) 40 MG capsule, Take 1 capsule (40 mg total) by mouth once daily., Disp: 30 capsule, Rfl: 11    oseltamivir (TAMIFLU) 75 MG capsule, Take 1 capsule (75 mg total) by mouth once daily. for 5 days, Disp: 5 capsule, Rfl: 0  No current facility-administered medications for this visit.     Review of Systems   Constitutional: Positive for fatigue and fever.   HENT: Positive for congestion (nasal/chest), rhinorrhea and sneezing. Negative for ear pain, sinus pain and sore throat.    Eyes: Negative for redness.   Respiratory: Positive for cough (yellow) and wheezing.    Gastrointestinal: Negative for diarrhea.   Genitourinary: Negative for frequency and urgency.   Musculoskeletal: Positive for myalgias.   Skin: Negative for rash.   Allergic/Immunologic: Negative for environmental allergies.   Neurological: Negative for dizziness, light-headedness and headaches.   Psychiatric/Behavioral: Negative for agitation. The patient is not nervous/anxious.        Objective:      BP (!) 141/91   Pulse 81   Temp 98.2 °F (36.8 °C)   Ht 6' 1" (1.854 m)   Wt 98.9 kg (218 lb)   BMI 28.76 kg/m²   Physical Exam   Constitutional: He is oriented to person, place, and time. He appears well-developed and well-nourished.   HENT:   Right Ear: Tympanic membrane is injected. A middle ear effusion is present.   Left Ear: A middle ear effusion is present.   Mouth/Throat: Posterior oropharyngeal edema present.   Eyes: Conjunctivae and EOM are normal. Pupils are equal, round, and reactive to light.   Neck: Normal range of motion. Neck supple.   Cardiovascular: Normal rate, regular rhythm, normal heart sounds and intact distal pulses.   Pulmonary/Chest: Effort normal. He has wheezes in the right upper field, the right middle field, the right lower field, the left upper " field, the left middle field and the left lower field.   Abdominal: Soft. Bowel sounds are normal.   Musculoskeletal: Normal range of motion.   Neurological: He is alert and oriented to person, place, and time.   Skin: Skin is warm and dry.   Psychiatric: He has a normal mood and affect. His behavior is normal. Judgment and thought content normal.       Assessment:       1. Influenza due to influenza A virus    2. Diffuse wheezing    3. Myalgia    4. Mild persistent asthma without complication    5. Mixed hyperlipidemia    6. Essential hypertension    7. Prostate cancer screening    8. Overweight (BMI 25.0-29.9)        Plan:         Jaden was seen today for influenza, fever, generalized body aches and emesis.    Diagnoses and all orders for this visit:    Influenza due to influenza A virus  -     oseltamivir (TAMIFLU) 75 MG capsule; Take 1 capsule (75 mg total) by mouth once daily. for 5 days    Diffuse wheezing  -     betamethasone acetate-betamethasone sodium phosphate injection 6 mg  -     albuterol-ipratropium 2.5 mg-0.5 mg/3 mL nebulizer solution 3 mL    Myalgia  -     Influenza A & B by Molecular    Mild persistent asthma without complication   albuterol (PROAIR HFA) 90 mcg/actuation inhaler   budesonide-formoterol 160-4.5 mcg (SYMBICORT) 160-4.5 mcg/actuation HFAA  Mixed hyperlipidemia  -     Lipid panel; Future    Essential hypertension  -     Comprehensive metabolic panel; Future  -     CBC W/ AUTO DIFFERENTIAL; Future   Low Sodium diet   Avoid OTC decongestants  BP Readings from Last 3 Encounters:   02/12/19 (!) 141/91   11/29/18 131/83   11/07/18 (!) 130/90     Prostate cancer screening  -     PSA, Screening; Future    Overweight (BMI 25.0-29.9)   Low fat, low carbohydrate diet   Regular excerise          Patient readiness: acceptance and barriers:none    During the course of the visit the patient was educated and counseled about the following:     Hypertension:   Dietary sodium restriction.  Regular  aerobic exercise.  Obesity:   General weight loss/lifestyle modification strategies discussed (elicit support from others; identify saboteurs; non-food rewards, etc).  Regular aerobic exercise program discussed.    Goals: Hypertension: Reduce Blood Pressure and Obesity: Reduce calorie intake and BMI    Did patient meet goals/outcomes: No    The following self management tools provided: declined    Patient Instructions (the written plan) was given to the patient/family.     Time spent with patient: 45 minutes    Barriers to medications present (no )    Adverse reactions to current medications (no)    Over the counter medications reviewed (Yes)

## 2019-02-12 NOTE — PATIENT INSTRUCTIONS
Influenza (Adult)    Influenza is also called the flu. It is a viral illness that affects the air passages of your lungs. It is different from the common cold. The flu can easily be passed from one to person to another. It may be spread through the air by coughing and sneezing. Or it can be spread by touching the sick person and then touching your own eyes, nose, or mouth.  The flu starts 1 to 3 days after you are exposed to the flu virus. It may last for 1 to 2 weeks but many people feel tired or fatigued for many weeks afterward. You usually dont need to take antibiotics unless you have a complication. This might be an ear or sinus infection or pneumonia.  Symptoms of the flu may be mild or severe. They can include extreme tiredness (wanting to stay in bed all day), chills, fevers, muscle aches, soreness with eye movement, headache, and a dry, hacking cough.  Home care  Follow these guidelines when caring for yourself at home:  · Avoid being around cigarette smoke, whether yours or other peoples.  · Acetaminophen or ibuprofen will help ease your fever, muscle aches, and headache. Dont give aspirin to anyone younger than 18 who has the flu. Aspirin can harm the liver.  · Nausea and loss of appetite are common with the flu. Eat light meals. Drink 6 to 8 glasses of liquids every day. Good choices are water, sport drinks, soft drinks without caffeine, juices, tea, and soup. Extra fluids will also help loosen secretions in your nose and lungs.  · Over-the-counter cold medicines will not make the flu go away faster. But the medicines may help with coughing, sore throat, and congestion in your nose and sinuses. Dont use a decongestant if you have high blood pressure.  · Stay home until your fever has been gone for at least 24 hours without using medicine to reduce fever.  Follow-up care  Follow up with your healthcare provider, or as advised, if you are not getting better over the next week.  If you are age 65 or  older, talk with your provider about getting a pneumococcal vaccine every 5 years. You should also get this vaccine if you have chronic asthma or COPD. All adults should get a flu vaccine every fall. Ask your provider about this.  When to seek medical advice  Call your healthcare provider right away if any of these occur:  · Cough with lots of colored mucus (sputum) or blood in your mucus  · Chest pain, shortness of breath, wheezing, or trouble breathing  · Severe headache, or face, neck, or ear pain  · New rash with fever  · Fever of 100.4°F (38°C) or higher, or as directed by your healthcare provider  · Confusion, behavior change, or seizure  · Severe weakness or dizziness  · You get a new fever or cough after getting better for a few days  Date Last Reviewed: 1/1/2017  © 6303-4899 [employer]. [address] [place] [address]. All rights reserved. This information is not intended as a substitute for professional medical care. Always follow your healthcare professional's instructions.        Flu Vaccines for Adults   The flu (influenza) is caused by a virus that is easily spread. A flu vaccine protects you and others from the flu. Its best to get a flu shot every year in late summer or early fall, as soon as the vaccine is available in your area. You can get it at your healthcare providers office or a health clinic. Pharmacies, senior centers, and workplaces often offer flu shots, too. If you want to know if your provider has the flu vaccine available, or if you have other questions, ask your healthcare provider.    Flu facts  · The flu shot wont give you the flu. The virus that is in the flu shot has been killed (inactivated).  · The flu can be dangerous--even life-threatening. Every year thousands of people die from complications from the flu.  · The flu is caused by a virus. It cant be treated with antibiotics.  · Influenza is not the same as stomach flu, the 24-hour virus that causes  vomiting and diarrhea. The stomach flu most likely happens because of a GI (gastrointestinal) infection, not the flu.  · You need to get a flu shot each year.   Flu symptoms  Flu symptoms tend to come on quickly. They include:  · Fever  · Headache  · Tiredness (fatigue)  · Cough  · Sore throat  · Runny nose  · Muscle aches  Upset stomach and vomiting are not common for adults. Some symptoms such as tiredness and cough may last for many weeks.  How a flu vaccine protects you  There are many types (strains) of the flu virus. Medical experts predict which strains are most likely to make people sick each year. Flu shots are made from these strains. When you get a flu vaccine, killed (inactivated) viruses are injected into your body. These cant give you the flu. But they do cause your body to make antibodies to fight these flu strains. If you are exposed to the same strains later in the flu season, the antibodies will fight off the germs.  Who should get the flu vaccine?  The CDC recommends that infants over the age of 6 months and all children and adults should get a flu shot every year.  Some people are at an increased risk of developing serious complications from the flu. It is extremely important that these people get the vaccine. They include those with:  · Long-term heart and lung conditions  · Other serious health conditions such as:  ¨ Endocrine disorders such as diabetes  ¨ Kidney or liver disorders  ¨ Weakened immune system from disease or medical treatment. For example, people with HIV or AIDS, or those taking long-term steroids or medicines to treat cancer.  ¨ Blood disorders such as sickle cell disease  It is also very important that others who have an increased risk of being exposed to the flu or are around people with increased risk for complications get the vaccine. This includes:  · Healthcare providers and other staff who provide care in hospitals, nursing homes, home health, and other  facilities  · Household members, including children of people in high-risk groups  Types of flu vaccines  The flu vaccine is available as a regular and a high-strength shot. Your healthcare provider will recommend the vaccine that is best for you.  Flu shot  The flu shot is available in a few different forms. Your healthcare provider will determine which vaccine is right for you. There is a high-dose vaccine for those over age 65 and a vaccine for those with egg allergies. It is safe for most people. Talk with your provider if you have had:  · A severe allergic reaction to a previous flu vaccine  · Guillain-Barré syndrome. This is a severe paralyzing condition.  Nasal spray  The nasal spray is not recommended for the 9125-0434 flu season. The CDC says this is because the nasal spray did not seem to protect against the flu over the last several flu seasons. In the past, it was meant for people ages 2 to 49.  Date Last Reviewed: 12/1/2016  © 0663-9842 [employer]. [address] [place] [address]. All rights reserved. This information is not intended as a substitute for professional medical care. Always follow your healthcare professional's instructions.        Adult Self-Care for Colds  Colds are caused by viruses. They can't be cured with antibiotics. However, you can ease symptoms and support your body's efforts to heal itself.  No matter which symptoms you have, be sure to:  · Drink plenty of fluids (water or clear soup)  · Stop smoking and drinking alcohol  · Get plenty of rest    Understand a fever  · Take your temperature several times a day. If your fever is 100.4°F (38.0°C) for more than a day, call your healthcare provider.  · Relax, lie down. Go to bed if you want. Just get off your feet and rest. Also, drink plenty of fluids to avoid dehydration.  · Take acetaminophen or a nonsteroidal anti-inflammatory agent (NSAID), such as ibuprofen.  Treat a troubled nose kindly  · Breathe steam  or heated humidified air to open blocked nasal passages.  a hot shower or use a vaporizer. Be careful not to get burned by the steam.  · Saline nasal sprays and decongestant tablets help open a stuffy nose. Antihistamines can also help, but they can cause side effects such as drowsiness and drying of the eyes, nose, and mouth.  Soothe a sore throat and cough  · Gargle every 2 hours with 1/4 teaspoon of salt dissolved in 1/2 cup of warm water. Suck on throat lozenges and cough drops to moisten your throat.  · Cough medicines are available but it is unclear how well they actually work.  · Take acetaminophen or an NSAID, such as ibuprofen, to ease throat pain  Ease digestive problems  · Put fluids back into your body. Take frequent sips of clear liquids such as water or broth. Avoid drinks that have a lot of sugar in them, such as juices and sodas. These can make diarrhea worse. Older children and adults can drink sports drinks.  · As your appetite returns, you can resume your normal diet. Ask your healthcare provider if there are any foods you should avoid.  When to seek medical care  When you first notice symptoms, ask your healthcare provider if antiviral medicines are appropriate. Antibiotics should not be taken for colds or flu. Also, call your healthcare provider if you have any of the following symptoms or if you aren't feeling better after 7 days:  · Shortness of breath  · Pain or pressure in the chest or belly (abdomen)  · Worsening symptoms, especially after a period of improvement  · Fever of 100.4°F  (38.0°C) or higher, or fever that doesn't go down with medicine  · Sudden dizziness or confusion  · Severe or continued vomiting  · Signs of dehydration, including extreme thirst, dark urine, infrequent urination, dry mouth  · Spotted, red, or very sore throat   Date Last Reviewed: 12/1/2016  © 3552-5796 [employer]. [address] [place] [address], [place] [address]. All rights reserved. This  information is not intended as a substitute for professional medical care. Always follow your healthcare professional's instructions.

## 2019-02-12 NOTE — LETTER
February 12, 2019      [facility]  [address] [place] [address]  Phone: [phone]  Fax: [phone]       Patient: Jaden Greene   YOB: 1960  Date of Visit: 02/12/2019    To Whom It May Concern:    Wallace Greene  was at [facility] on 02/12/2019. He may return to work/school on 02/14/19 with no restrictions. If you have any questions or concerns, or if I can be of further assistance, please do not hesitate to contact me.    Sincerely,    Evelyn Holguin NP

## 2019-02-21 ENCOUNTER — TELEPHONE (OUTPATIENT)
Dept: DERMATOLOGY | Facility: CLINIC | Age: 59
End: 2019-02-21

## 2019-02-21 NOTE — TELEPHONE ENCOUNTER
Provided prices for cosmetic procedures.  ----- Message from Dav Mancuso sent at 2/21/2019 11:39 AM CST -----  Contact: Patient  Type: Needs Medical Advice    Who Called:  Patient  Best Call Back Number: [phone]; [phone] (cell)  Additional Information: Patient would like to discuss cosmetic surgery. Please call to advise. Thanks!

## 2019-02-27 ENCOUNTER — TELEPHONE (OUTPATIENT)
Dept: DERMATOLOGY | Facility: CLINIC | Age: 59
End: 2019-02-27

## 2019-02-27 NOTE — TELEPHONE ENCOUNTER
Spoke with pt regarding price of mole removal/skin tag removal. Informed pt the price of mole removal depended on what the insurance would cover, if removal was medically necessary, pathology charge, etc. Skin tag removal is $232 for up to 14. Pt stated he would keep his upcoming appt.

## 2019-03-13 ENCOUNTER — OFFICE VISIT (OUTPATIENT)
Dept: DERMATOLOGY | Facility: CLINIC | Age: 59
End: 2019-03-13
Payer: COMMERCIAL

## 2019-03-13 DIAGNOSIS — D48.5 NEOPLASM OF UNCERTAIN BEHAVIOR OF SKIN: Primary | ICD-10-CM

## 2019-03-13 DIAGNOSIS — L91.8 CUTANEOUS SKIN TAGS: ICD-10-CM

## 2019-03-13 PROCEDURE — 11102 PR TANGENTIAL BIOPSY, SKIN, SINGLE LESION: ICD-10-PCS | Mod: S$GLB,,, | Performed by: DERMATOLOGY

## 2019-03-13 PROCEDURE — 88305 TISSUE EXAM BY PATHOLOGIST: CPT | Performed by: PATHOLOGY

## 2019-03-13 PROCEDURE — 99213 PR OFFICE/OUTPT VISIT, EST, LEVL III, 20-29 MIN: ICD-10-PCS | Mod: 25,S$GLB,, | Performed by: DERMATOLOGY

## 2019-03-13 PROCEDURE — 99213 OFFICE O/P EST LOW 20 MIN: CPT | Mod: 25,S$GLB,, | Performed by: DERMATOLOGY

## 2019-03-13 PROCEDURE — 99999 PR PBB SHADOW E&M-EST. PATIENT-LVL II: ICD-10-PCS | Mod: PBBFAC,,, | Performed by: DERMATOLOGY

## 2019-03-13 PROCEDURE — 11102 TANGNTL BX SKIN SINGLE LES: CPT | Mod: S$GLB,,, | Performed by: DERMATOLOGY

## 2019-03-13 PROCEDURE — 88305 TISSUE SPECIMEN TO PATHOLOGY, DERMATOLOGY: ICD-10-PCS | Mod: 26,,, | Performed by: PATHOLOGY

## 2019-03-13 PROCEDURE — 99999 PR PBB SHADOW E&M-EST. PATIENT-LVL II: CPT | Mod: PBBFAC,,, | Performed by: DERMATOLOGY

## 2019-03-13 PROCEDURE — 88305 TISSUE EXAM BY PATHOLOGIST: CPT | Mod: 26,,, | Performed by: PATHOLOGY

## 2019-03-13 NOTE — PROGRESS NOTES
Subjective:       Patient ID:  Jaden Greene is a 58 y.o. male who presents for No chief complaint on file.    Last seen by Dr. Rg 1/24/2019  Present for skin tag removal    No phx of NMSC  No fhx of melanoma    Past Medical History:  No date: Asthma  No date: Hyperlipidemia  No date: Hypertension        C/o growth right forehead , several years, growing over time. No tx. Desires removal.     Skin tags, face axilla, years, desires removal. No treatment.     Review of Systems   Constitutional: Negative for fever and chills.   HENT: Negative for sore throat.    Respiratory: Negative for cough.    Gastrointestinal: Negative for nausea and vomiting.   Skin: Negative for itching, rash and dry skin.        Objective:    Physical Exam   Constitutional: He appears well-developed and well-nourished. No distress.   HENT:   Head:       Eyes: Lids are normal.  No conjunctival no injection.   Neurological: He is alert and oriented to person, place, and time. He is not disoriented.   Psychiatric: He has a normal mood and affect.   Skin:   Areas Examined (abnormalities noted in diagram):   Scalp / Hair Palpated and Inspected  Head / Face Inspection Performed  Neck Inspection Performed  RUE Inspected  LUE Inspection Performed                       Diagram Legend     Erythematous scaling macule/papule c/w actinic keratosis       Vascular papule c/w angioma      Pigmented verrucoid papule/plaque c/w seborrheic keratosis      Yellow umbilicated papule c/w sebaceous hyperplasia      Irregularly shaped tan macule c/w lentigo     1-2 mm smooth white papules consistent with Milia      Movable subcutaneous cyst with punctum c/w epidermal inclusion cyst      Subcutaneous movable cyst c/w pilar cyst      Firm pink to brown papule c/w dermatofibroma      Pedunculated fleshy papule(s) c/w skin tag(s)      Evenly pigmented macule c/w junctional nevus     Mildly variegated pigmented, slightly irregular-bordered macule c/w mildly  atypical nevus      Flesh colored to evenly pigmented papule c/w intradermal nevus       Pink pearly papule/plaque c/w basal cell carcinoma      Erythematous hyperkeratotic cursted plaque c/w SCC      Surgical scar with no sign of skin cancer recurrence      Open and closed comedones      Inflammatory papules and pustules      Verrucoid papule consistent consistent with wart     Erythematous eczematous patches and plaques     Dystrophic onycholytic nail with subungual debris c/w onychomycosis     Umbilicated papule    Erythematous-base heme-crusted tan verrucoid plaque consistent with inflamed seborrheic keratosis     Erythematous Silvery Scaling Plaque c/w Psoriasis     See annotation      Assessment / Plan:      Pathology Orders:     Normal Orders This Visit    Tissue Specimen To Pathology, Dermatology     Questions:    Directional Terms:  Other(comment)    Clinical Information:  nevus vs other    Specific Site:  right forehead        Neoplasm of uncertain behavior of skin  -     Tissue Specimen To Pathology, Dermatology    Shave biopsy procedure note:    Shave biopsy performed after verbal consent including risk of infection, scar, recurrence, need for additional treatment of site. Area prepped with alcohol, anesthetized with approximately 1.0cc of 1% lidocaine with epinephrine. Lesional tissue shaved with razor blade. Hemostasis achieved with application of aluminum chloride followed by hyfrecation. No complications. Dressing applied. Wound care explained.    Discussed at length with patient I cannot estimate cost of lesion biopsy today. Offered to give diagnosis and procedure code for patient to call insurance prior to removal. Pt declines. Opts to proceed today with biopsy    Cutaneous skin tags  Reassurance given to patient. No treatment is necessary.   Treatment of benign, asymptomatic lesions may be considered cosmetic.  $232 for removal of up to 14 lesions, declines today             Follow-up if symptoms  worsen or fail to improve.

## 2019-04-15 ENCOUNTER — PATIENT OUTREACH (OUTPATIENT)
Dept: ADMINISTRATIVE | Facility: HOSPITAL | Age: 59
End: 2019-04-15

## 2019-09-19 ENCOUNTER — OFFICE VISIT (OUTPATIENT)
Dept: PODIATRY | Facility: CLINIC | Age: 59
End: 2019-09-19
Payer: COMMERCIAL

## 2019-09-19 VITALS
DIASTOLIC BLOOD PRESSURE: 95 MMHG | HEIGHT: 73 IN | SYSTOLIC BLOOD PRESSURE: 159 MMHG | WEIGHT: 228.19 LBS | BODY MASS INDEX: 30.24 KG/M2 | HEART RATE: 72 BPM

## 2019-09-19 DIAGNOSIS — S93.401A MODERATE RIGHT ANKLE SPRAIN, INITIAL ENCOUNTER: ICD-10-CM

## 2019-09-19 DIAGNOSIS — E78.5 HYPERLIPIDEMIA: ICD-10-CM

## 2019-09-19 DIAGNOSIS — M76.71 PERONEAL TENDONITIS, RIGHT: Primary | ICD-10-CM

## 2019-09-19 PROCEDURE — 3080F PR MOST RECENT DIASTOLIC BLOOD PRESSURE >= 90 MM HG: ICD-10-PCS | Mod: CPTII,S$GLB,, | Performed by: PODIATRIST

## 2019-09-19 PROCEDURE — 3008F PR BODY MASS INDEX (BMI) DOCUMENTED: ICD-10-PCS | Mod: CPTII,S$GLB,, | Performed by: PODIATRIST

## 2019-09-19 PROCEDURE — 3077F PR MOST RECENT SYSTOLIC BLOOD PRESSURE >= 140 MM HG: ICD-10-PCS | Mod: CPTII,S$GLB,, | Performed by: PODIATRIST

## 2019-09-19 PROCEDURE — 99203 PR OFFICE/OUTPT VISIT, NEW, LEVL III, 30-44 MIN: ICD-10-PCS | Mod: S$GLB,,, | Performed by: PODIATRIST

## 2019-09-19 PROCEDURE — 3080F DIAST BP >= 90 MM HG: CPT | Mod: CPTII,S$GLB,, | Performed by: PODIATRIST

## 2019-09-19 PROCEDURE — 99999 PR PBB SHADOW E&M-EST. PATIENT-LVL III: CPT | Mod: PBBFAC,,, | Performed by: PODIATRIST

## 2019-09-19 PROCEDURE — 3008F BODY MASS INDEX DOCD: CPT | Mod: CPTII,S$GLB,, | Performed by: PODIATRIST

## 2019-09-19 PROCEDURE — 99999 PR PBB SHADOW E&M-EST. PATIENT-LVL III: ICD-10-PCS | Mod: PBBFAC,,, | Performed by: PODIATRIST

## 2019-09-19 PROCEDURE — 3077F SYST BP >= 140 MM HG: CPT | Mod: CPTII,S$GLB,, | Performed by: PODIATRIST

## 2019-09-19 PROCEDURE — 99203 OFFICE O/P NEW LOW 30 MIN: CPT | Mod: S$GLB,,, | Performed by: PODIATRIST

## 2019-09-19 RX ORDER — NAPROXEN 500 MG/1
500 TABLET ORAL 2 TIMES DAILY WITH MEALS
Qty: 60 TABLET | Refills: 0 | Status: SHIPPED | OUTPATIENT
Start: 2019-09-19 | End: 2019-10-19

## 2019-09-19 RX ORDER — ATORVASTATIN CALCIUM 20 MG/1
TABLET, FILM COATED ORAL
Qty: 90 TABLET | Refills: 0 | Status: SHIPPED | OUTPATIENT
Start: 2019-09-19 | End: 2021-04-20

## 2019-09-19 NOTE — PROGRESS NOTES
Subjective:      Patient ID: Jaden Greene is a 59 y.o. male.    Chief Complaint: Foot Pain (right heal)    Jaden is a 59 y.o. male who presents to the podiatry clinic  with complaint of  right foot pain. Onset of the symptoms was 9/11/19. Precipitating event: inversion injury to foot and inversion injury to ankle. Current symptoms include: ability to bear weight, but with some pain, swelling and worsening symptoms after a period of activity. Aggravating factors: any weight bearing. Symptoms have gradually worsened. Patient has had prior foot problems. Evaluation to date: plain films: normal and was seen in urgent care and was told to stay off it and was given hydrocodone. Treatment to date: rest and vicodin. Patients rates pain 0/10 on pain scale sitting here, 5/10 to start the day and 10/10 by the end of the day. He works on his feet as a maurice.      Review of Systems   Constitution: Negative for chills and fever.   Cardiovascular: Negative for claudication and leg swelling.   Respiratory: Negative for shortness of breath.    Skin: Negative for itching, nail changes and rash.   Musculoskeletal: Negative for muscle cramps, muscle weakness and myalgias.        Right foot pain   Gastrointestinal: Negative for nausea and vomiting.   Neurological: Negative for focal weakness, loss of balance, numbness and paresthesias.           Objective:      Physical Exam   Constitutional: He is oriented to person, place, and time. He appears well-developed and well-nourished. No distress.   Cardiovascular:   Pulses:       Dorsalis pedis pulses are 2+ on the right side, and 2+ on the left side.        Posterior tibial pulses are 2+ on the right side, and 2+ on the left side.   < 3 sec capillary refill time to toes 1-5 bilateral. Toes and feet are warm to touch proximally with normal distal cooling b/l. There is some hair growth on the feet and toes b/l. There is no edema b/l. No spider veins or varicosities present b/l.       Musculoskeletal:   Right foot pain with palpation to the distal peroneal brevis tendon, pain with passive plantar flexion inversion and with eversion against resistance.    Pain with palpation at the ATFL, not to the CFL or the lateral malleolus. No pain with palpation at the base of the fifth metatarsal    Equinus noted b/l ankles with < 10 deg DF noted. MMT 5/5 in DF/PF/Inv/Ev resistance with no reproduction of pain in any direction. Passive range of motion of ankle and pedal joints is painless b/l.     Neurological: He is alert and oriented to person, place, and time. He has normal strength. He displays no atrophy and no tremor. No sensory deficit. He exhibits normal muscle tone.   Negative tinel sign bilateral.   Skin: Skin is warm, dry and intact. No abrasion, no bruising, no burn, no ecchymosis, no laceration, no lesion, no petechiae and no rash noted. He is not diaphoretic. No cyanosis or erythema. No pallor. Nails show no clubbing.   Skin temperature, texture and turgor within normal limits.   Psychiatric: He has a normal mood and affect. His behavior is normal.             Assessment:       Encounter Diagnoses   Name Primary?    Peroneal tendonitis, right Yes    Moderate right ankle sprain, initial encounter          Plan:       Jaden was seen today for foot pain.    Diagnoses and all orders for this visit:    Peroneal tendonitis, right    Moderate right ankle sprain, initial encounter    Other orders  -     naproxen (NAPROSYN) 500 MG tablet; Take 1 tablet (500 mg total) by mouth 2 (two) times daily with meals.      I counseled the patient on his conditions, their implications and medical management.    Dispensed, fitted and gait trained with ankle brace. Instructed to wear with supportive shoe at all times when placing weight on the foot.    The patient was advised that NSAID-type medications have two very important potential side effects: gastrointestinal irritation including hemorrhage and renal  injuries. He was asked to take the medication with food and to stop if he experiences any GI upset.     Patient instructed on adequate icing techniques. Patient should ice the affected area at least once per day x 15 minutes for 10 days . I advised the  patient that extra icing would also be beneficial to ensure adequate anti inflammatory effect     Discussed if it is not improving will need to go into a tall boot     Return in 1 month follow up    Lc Pacheco DPM

## 2019-11-22 ENCOUNTER — OFFICE VISIT (OUTPATIENT)
Dept: FAMILY MEDICINE | Facility: CLINIC | Age: 59
End: 2019-11-22
Payer: COMMERCIAL

## 2019-11-22 VITALS
HEIGHT: 73 IN | DIASTOLIC BLOOD PRESSURE: 80 MMHG | TEMPERATURE: 98 F | SYSTOLIC BLOOD PRESSURE: 136 MMHG | WEIGHT: 228.19 LBS | BODY MASS INDEX: 30.24 KG/M2 | OXYGEN SATURATION: 96 % | HEART RATE: 94 BPM

## 2019-11-22 DIAGNOSIS — M54.9 MUSCULOSKELETAL BACK PAIN: Primary | ICD-10-CM

## 2019-11-22 PROCEDURE — 99213 OFFICE O/P EST LOW 20 MIN: CPT | Mod: S$GLB,,, | Performed by: NURSE PRACTITIONER

## 2019-11-22 PROCEDURE — 99999 PR PBB SHADOW E&M-EST. PATIENT-LVL IV: ICD-10-PCS | Mod: PBBFAC,,, | Performed by: NURSE PRACTITIONER

## 2019-11-22 PROCEDURE — 3008F PR BODY MASS INDEX (BMI) DOCUMENTED: ICD-10-PCS | Mod: CPTII,S$GLB,, | Performed by: NURSE PRACTITIONER

## 2019-11-22 PROCEDURE — 3008F BODY MASS INDEX DOCD: CPT | Mod: CPTII,S$GLB,, | Performed by: NURSE PRACTITIONER

## 2019-11-22 PROCEDURE — 3075F SYST BP GE 130 - 139MM HG: CPT | Mod: CPTII,S$GLB,, | Performed by: NURSE PRACTITIONER

## 2019-11-22 PROCEDURE — 3079F DIAST BP 80-89 MM HG: CPT | Mod: CPTII,S$GLB,, | Performed by: NURSE PRACTITIONER

## 2019-11-22 PROCEDURE — 3075F PR MOST RECENT SYSTOLIC BLOOD PRESS GE 130-139MM HG: ICD-10-PCS | Mod: CPTII,S$GLB,, | Performed by: NURSE PRACTITIONER

## 2019-11-22 PROCEDURE — 99213 PR OFFICE/OUTPT VISIT, EST, LEVL III, 20-29 MIN: ICD-10-PCS | Mod: S$GLB,,, | Performed by: NURSE PRACTITIONER

## 2019-11-22 PROCEDURE — 3079F PR MOST RECENT DIASTOLIC BLOOD PRESSURE 80-89 MM HG: ICD-10-PCS | Mod: CPTII,S$GLB,, | Performed by: NURSE PRACTITIONER

## 2019-11-22 PROCEDURE — 99999 PR PBB SHADOW E&M-EST. PATIENT-LVL IV: CPT | Mod: PBBFAC,,, | Performed by: NURSE PRACTITIONER

## 2019-11-22 RX ORDER — TIZANIDINE HYDROCHLORIDE 4 MG/1
4 CAPSULE, GELATIN COATED ORAL 2 TIMES DAILY PRN
Qty: 60 CAPSULE | Refills: 1 | Status: SHIPPED | OUTPATIENT
Start: 2019-11-22 | End: 2019-12-02

## 2019-11-22 RX ORDER — NAPROXEN SODIUM 220 MG
220 TABLET ORAL
COMMUNITY
End: 2021-09-05 | Stop reason: ALTCHOICE

## 2019-11-22 NOTE — LETTER
November 22, 2019      [facility]  [address] [place] [address]  Phone: [phone]  Fax: [phone]       Patient: Jaden Greene   YOB: 1960  Date of Visit: 11/22/2019    To Whom It May Concern:    Wallace Greene  was at [facility] on 11/22/2019. He may return to work/school on 11/24/2019 with no restrictions. If you have any questions or concerns, or if I can be of further assistance, please do not hesitate to contact me.    Sincerely,    Hannah Rosado DNP, APRN

## 2019-11-22 NOTE — PROGRESS NOTES
"Subjective:       Patient ID: Jaden Greene is a 59 y.o. male.    Chief Complaint: Back Pain (1 day )  He was last seen in primary care by AIYANA Holguin NP on 02/12/2019.  This is his first time seeing me in the clinic.   HPI   Complain of right mid section pain for 24 hours. Denies fall or injury. States he has "been "picking stuff up and gradually came on".  Vitals:    11/22/19 1427   BP: 136/80   Pulse: 94   Temp: 98.3 °F (36.8 °C)     Review of Systems   Musculoskeletal: Positive for back pain.       He is a  and has not changed any of his regular duties.  Pain is pulling in nature and is sharp with kneeling. Pain is  10 on pain scale today, He has taken 500mg once today with no relief.  Denies loss of control of bowel or bladder  The pain radiates to side   Denies pain with walking but "crooked" and cannot straighten up  States had pain of this nature prior in the summer.  He is having some problems when turning over onto side. States has to stay straight or has more pain.  Objective:      Physical Exam   Constitutional: He is oriented to person, place, and time. Vital signs are normal. He appears well-developed and well-nourished. He is active and cooperative.   HENT:   Head: Normocephalic and atraumatic.   Right Ear: Hearing, tympanic membrane, external ear and ear canal normal.   Left Ear: Hearing, tympanic membrane, external ear and ear canal normal.   Nose: Nose normal.   Mouth/Throat: Uvula is midline, oropharynx is clear and moist and mucous membranes are normal.   Eyes: Conjunctivae and lids are normal.   Neck: Trachea normal, normal range of motion, full passive range of motion without pain and phonation normal. Neck supple.   Cardiovascular: Normal rate and regular rhythm.   Pulmonary/Chest: Effort normal and breath sounds normal.   Abdominal: Soft. Bowel sounds are normal. There is no tenderness.   Musculoskeletal: He exhibits tenderness.        Thoracic back: He exhibits pain.        " Back:    Pain to right mid to lower back region that is exacerbated with bending. Increased pain with right leg lift.   Lymphadenopathy:        Head (right side): No submental, no submandibular, no tonsillar, no preauricular, no posterior auricular and no occipital adenopathy present.        Head (left side): No submental, no submandibular, no tonsillar, no preauricular, no posterior auricular and no occipital adenopathy present.     He has no cervical adenopathy.   Neurological: He is alert and oriented to person, place, and time.   Skin: Skin is warm, dry and intact.   Psychiatric: He has a normal mood and affect. His speech is normal and behavior is normal. Judgment and thought content normal. Cognition and memory are normal.   Nursing note and vitals reviewed.      Assessment & Plan:       Musculoskeletal back pain  -     tiZANidine 4 mg Cap; Take 4 mg by mouth 2 (two) times daily as needed (back muscular pain or spasms).  Dispense: 60 capsule; Refill: 1          Medication List with Changes/Refills   New Medications    TIZANIDINE 4 MG CAP    Take 4 mg by mouth 2 (two) times daily as needed (back muscular pain or spasms).   Current Medications    ALBUTEROL (PROAIR HFA) 90 MCG/ACTUATION INHALER    Inhale 2 puffs into the lungs every 4 (four) hours as needed. PRN    AMLODIPINE (NORVASC) 10 MG TABLET    Start if blood pressure over 140 or over 95.    ATORVASTATIN (LIPITOR) 20 MG TABLET    TAKE ONE TABLET BY MOUTH ONCE DAILY    BUDESONIDE-FORMOTEROL 160-4.5 MCG (SYMBICORT) 160-4.5 MCG/ACTUATION HFAA    Inhale 2 puffs into the lungs every 12 (twelve) hours. Controller    CHLORTHALIDONE (HYGROTEN) 25 MG TAB    Take 1 tablet (25 mg total) by mouth once daily.    MONTELUKAST (SINGULAIR) 10 MG TABLET    Take 1 tablet (10 mg total) by mouth every evening.    NAPROXEN SODIUM (ANAPROX) 220 MG TABLET    Take 220 mg by mouth every 12 (twelve) hours.    OMEPRAZOLE (PRILOSEC) 40 MG CAPSULE    Take 1 capsule (40 mg total) by  mouth once daily.   Discontinued Medications    PREDNISONE (DELTASONE) 20 MG TABLET    Take one pill a day for three days, repeat for shortness of breath     Follow up in about 3 months (around 2/22/2020), or if symptoms worsen or fail to improve.

## 2019-11-22 NOTE — PATIENT INSTRUCTIONS
Back Spasm (No Trauma)    Spasm of the back muscles can occur after a sudden forceful twisting or bending force (such as in a car accident), after a simple awkward movement, or after lifting something heavy with poor body positioning. In any case, muscle spasm adds to the pain. Sleeping in an awkward position or on a poor quality mattress can also cause this. Some people respond to emotional stress by tensing the muscles of their back.  Pain that continues may need further evaluation or other types of treatment such as physical therapy.  You don't always need X-rays for the initial evaluation of back pain, unless you had a physical injury such as from a car accident or fall. If your pain continues and doesn't respond to medical treatment, X-rays and other tests may then be done.   Home care  · As soon as possible, start sitting or walking again to avoid problems from prolonged bed rest (muscle weakness, worsening back stiffness and pain, blood clots in the legs).  · When in bed, try to find a position of comfort. A firm mattress is best. Try lying flat on your back with pillows under your knees. You can also try lying on your side with your knees bent up toward your chest and a pillow between your knees.  · Avoid prolonged sitting, long car rides, or travel. This puts more stress on the lower back than standing or walking.   · During the first 24 to 72 hours after an injury or flare-up, apply an ice pack to the painful area for 20 minutes, then remove it for 20 minutes. Do this over a period of 60 to 90 minutes or several times a day. This will reduce swelling and pain. Always wrap ice packs in a thin towel.  · You can start with ice, then switch to heat. Heat (hot shower, hot bath, or heating pad) reduces pain, and works well for muscle spasms. Apply heat to the painful area for 20 minutes, then remove it for 20 minutes. Do this over a period of 60 to 90 minutes or several times a day. Do not sleep on a heating  pad as it can burn or damage skin.  · Alternate ice and heat therapies.  · Be aware of safe lifting methods and do not lift anything over 15 pounds until all the pain is gone.  Gentle stretching will help your back heal faster. Do this simple routine 2 to 3 times a day until your back is feeling better.  · Lie on your back with your knees bent and both feet on the ground  · Slowly raise your left knee to your chest as you flatten your lower back against the floor. Hold for 20 to 30 seconds.  · Relax and repeat the exercise with your right knee.  · Do 2 to 3 of these exercises for each leg.  · Repeat, hugging both knees to your chest at the same time.  · Do not bounce, but use a gentle pull.  Medicines  Talk to your doctor before using medicine, especially if you have other medical problems or are taking other medicines.  You may use acetaminophen or ibuprofen to control pain, unless your healthcare provider prescribed another pain medicine. If you have a chronic condition such as diabetes, liver or kidney disease, stomach ulcer, or gastrointestinal bleeding, or are taking blood thinners, talk with your healthcare provider before taking any medicines.  Be careful if you are given prescription pain medicine, narcotics, or medicine for muscle spasm. They can cause drowsiness, affect your coordination, reflexes, or judgment. Do not drive or operate heavy machinery when taking these medicines. Take pain medicine only as prescribed by your healthcare provider.  Follow-up care  Follow up with your doctor, or as advised. Physical therapy or further tests may be needed.  If X-rays were taken, they may be reviewed by a radiologist. You will be notified of any new findings that may affect your care.  Call 911  Seek emergency medical care if any of these occur:  · Trouble breathing  · Confusion  · Drowsiness or trouble awakening  · Fainting or loss of consciousness  · Rapid or very slow heart rate  · Loss of bowel or bladder  control  When to seek medical advice  Call your healthcare provider right away if any of these occur:  · Pain becomes worse or spreads to your legs  · Weakness or numbness in one or both legs  · Numbness in the groin or genital area  · Unexplained fever over 100.4ºF (38.0ºC)  · Burning or pain when passing urine  Date Last Reviewed: 6/1/2016  © 7155-6820 [employer]. [address] [place] [address], [place] [id number]. All rights reserved. This information is not intended as a substitute for professional medical care. Always follow your healthcare professional's instructions.

## 2019-12-09 DIAGNOSIS — I10 ESSENTIAL HYPERTENSION: ICD-10-CM

## 2019-12-09 DIAGNOSIS — J45.20 MILD INTERMITTENT ASTHMA WITHOUT COMPLICATION: ICD-10-CM

## 2019-12-09 RX ORDER — MONTELUKAST SODIUM 10 MG/1
10 TABLET ORAL NIGHTLY
Qty: 90 TABLET | Refills: 3 | Status: SHIPPED | OUTPATIENT
Start: 2019-12-09 | End: 2021-08-04 | Stop reason: SDUPTHER

## 2019-12-09 RX ORDER — CHLORTHALIDONE 25 MG/1
25 TABLET ORAL DAILY
Qty: 90 TABLET | Refills: 3 | Status: SHIPPED | OUTPATIENT
Start: 2019-12-09 | End: 2021-05-06

## 2021-04-21 ENCOUNTER — TELEPHONE (OUTPATIENT)
Dept: FAMILY MEDICINE | Facility: CLINIC | Age: 61
End: 2021-04-21

## 2021-05-06 ENCOUNTER — PATIENT MESSAGE (OUTPATIENT)
Dept: RESEARCH | Facility: HOSPITAL | Age: 61
End: 2021-05-06

## 2021-06-04 ENCOUNTER — TELEPHONE (OUTPATIENT)
Dept: FAMILY MEDICINE | Facility: CLINIC | Age: 61
End: 2021-06-04

## 2021-06-07 ENCOUNTER — LAB VISIT (OUTPATIENT)
Dept: LAB | Facility: HOSPITAL | Age: 61
End: 2021-06-07
Attending: NURSE PRACTITIONER
Payer: COMMERCIAL

## 2021-06-07 ENCOUNTER — OFFICE VISIT (OUTPATIENT)
Dept: FAMILY MEDICINE | Facility: CLINIC | Age: 61
End: 2021-06-07
Payer: COMMERCIAL

## 2021-06-07 VITALS
OXYGEN SATURATION: 97 % | DIASTOLIC BLOOD PRESSURE: 100 MMHG | HEART RATE: 76 BPM | BODY MASS INDEX: 31.2 KG/M2 | HEIGHT: 73 IN | WEIGHT: 235.44 LBS | SYSTOLIC BLOOD PRESSURE: 142 MMHG | TEMPERATURE: 98 F

## 2021-06-07 DIAGNOSIS — F32.A DEPRESSION, UNSPECIFIED DEPRESSION TYPE: Primary | ICD-10-CM

## 2021-06-07 DIAGNOSIS — E78.2 MIXED HYPERLIPIDEMIA: ICD-10-CM

## 2021-06-07 DIAGNOSIS — Z12.5 SCREENING FOR PROSTATE CANCER: ICD-10-CM

## 2021-06-07 DIAGNOSIS — I10 ESSENTIAL HYPERTENSION: ICD-10-CM

## 2021-06-07 LAB
25(OH)D3+25(OH)D2 SERPL-MCNC: 20 NG/ML (ref 30–96)
ALBUMIN SERPL BCP-MCNC: 4.1 G/DL (ref 3.5–5.2)
ALP SERPL-CCNC: 84 U/L (ref 55–135)
ALT SERPL W/O P-5'-P-CCNC: 42 U/L (ref 10–44)
ANION GAP SERPL CALC-SCNC: 12 MMOL/L (ref 8–16)
AST SERPL-CCNC: 30 U/L (ref 10–40)
BASOPHILS # BLD AUTO: 0.08 K/UL (ref 0–0.2)
BASOPHILS NFR BLD: 0.7 % (ref 0–1.9)
BILIRUB SERPL-MCNC: 1 MG/DL (ref 0.1–1)
BUN SERPL-MCNC: 20 MG/DL (ref 6–20)
CALCIUM SERPL-MCNC: 9.6 MG/DL (ref 8.7–10.5)
CHLORIDE SERPL-SCNC: 98 MMOL/L (ref 95–110)
CHOLEST SERPL-MCNC: 159 MG/DL (ref 120–199)
CHOLEST/HDLC SERPL: 3.4 {RATIO} (ref 2–5)
CO2 SERPL-SCNC: 30 MMOL/L (ref 23–29)
COMPLEXED PSA SERPL-MCNC: 0.16 NG/ML (ref 0–4)
CREAT SERPL-MCNC: 1 MG/DL (ref 0.5–1.4)
DIFFERENTIAL METHOD: ABNORMAL
EOSINOPHIL # BLD AUTO: 0.4 K/UL (ref 0–0.5)
EOSINOPHIL NFR BLD: 3.4 % (ref 0–8)
ERYTHROCYTE [DISTWIDTH] IN BLOOD BY AUTOMATED COUNT: 12.3 % (ref 11.5–14.5)
EST. GFR  (AFRICAN AMERICAN): >60 ML/MIN/1.73 M^2
EST. GFR  (NON AFRICAN AMERICAN): >60 ML/MIN/1.73 M^2
GLUCOSE SERPL-MCNC: 153 MG/DL (ref 70–110)
HCT VFR BLD AUTO: 47.7 % (ref 40–54)
HDLC SERPL-MCNC: 47 MG/DL (ref 40–75)
HDLC SERPL: 29.6 % (ref 20–50)
HGB BLD-MCNC: 16.2 G/DL (ref 14–18)
IMM GRANULOCYTES # BLD AUTO: 0.03 K/UL (ref 0–0.04)
IMM GRANULOCYTES NFR BLD AUTO: 0.3 % (ref 0–0.5)
LDLC SERPL CALC-MCNC: 79.4 MG/DL (ref 63–159)
LYMPHOCYTES # BLD AUTO: 2.8 K/UL (ref 1–4.8)
LYMPHOCYTES NFR BLD: 25.7 % (ref 18–48)
MCH RBC QN AUTO: 31.2 PG (ref 27–31)
MCHC RBC AUTO-ENTMCNC: 34 G/DL (ref 32–36)
MCV RBC AUTO: 92 FL (ref 82–98)
MONOCYTES # BLD AUTO: 0.9 K/UL (ref 0.3–1)
MONOCYTES NFR BLD: 7.9 % (ref 4–15)
NEUTROPHILS # BLD AUTO: 6.7 K/UL (ref 1.8–7.7)
NEUTROPHILS NFR BLD: 62 % (ref 38–73)
NONHDLC SERPL-MCNC: 112 MG/DL
NRBC BLD-RTO: 0 /100 WBC
PLATELET # BLD AUTO: 313 K/UL (ref 150–450)
PMV BLD AUTO: 10.1 FL (ref 9.2–12.9)
POTASSIUM SERPL-SCNC: 3.6 MMOL/L (ref 3.5–5.1)
PROT SERPL-MCNC: 7.6 G/DL (ref 6–8.4)
RBC # BLD AUTO: 5.2 M/UL (ref 4.6–6.2)
SODIUM SERPL-SCNC: 140 MMOL/L (ref 136–145)
TRIGL SERPL-MCNC: 163 MG/DL (ref 30–150)
WBC # BLD AUTO: 10.78 K/UL (ref 3.9–12.7)

## 2021-06-07 PROCEDURE — 99999 PR PBB SHADOW E&M-EST. PATIENT-LVL IV: ICD-10-PCS | Mod: PBBFAC,,, | Performed by: NURSE PRACTITIONER

## 2021-06-07 PROCEDURE — 85025 COMPLETE CBC W/AUTO DIFF WBC: CPT | Performed by: NURSE PRACTITIONER

## 2021-06-07 PROCEDURE — 1126F PR PAIN SEVERITY QUANTIFIED, NO PAIN PRESENT: ICD-10-PCS | Mod: S$GLB,,, | Performed by: NURSE PRACTITIONER

## 2021-06-07 PROCEDURE — 82306 VITAMIN D 25 HYDROXY: CPT | Performed by: NURSE PRACTITIONER

## 2021-06-07 PROCEDURE — 99396 PR PREVENTIVE VISIT,EST,40-64: ICD-10-PCS | Mod: S$GLB,,, | Performed by: NURSE PRACTITIONER

## 2021-06-07 PROCEDURE — 84153 ASSAY OF PSA TOTAL: CPT | Performed by: NURSE PRACTITIONER

## 2021-06-07 PROCEDURE — 3008F BODY MASS INDEX DOCD: CPT | Mod: CPTII,S$GLB,, | Performed by: NURSE PRACTITIONER

## 2021-06-07 PROCEDURE — 80053 COMPREHEN METABOLIC PANEL: CPT | Performed by: NURSE PRACTITIONER

## 2021-06-07 PROCEDURE — 3008F PR BODY MASS INDEX (BMI) DOCUMENTED: ICD-10-PCS | Mod: CPTII,S$GLB,, | Performed by: NURSE PRACTITIONER

## 2021-06-07 PROCEDURE — 36415 COLL VENOUS BLD VENIPUNCTURE: CPT | Mod: PO | Performed by: NURSE PRACTITIONER

## 2021-06-07 PROCEDURE — 99999 PR PBB SHADOW E&M-EST. PATIENT-LVL IV: CPT | Mod: PBBFAC,,, | Performed by: NURSE PRACTITIONER

## 2021-06-07 PROCEDURE — 99396 PREV VISIT EST AGE 40-64: CPT | Mod: S$GLB,,, | Performed by: NURSE PRACTITIONER

## 2021-06-07 PROCEDURE — 1126F AMNT PAIN NOTED NONE PRSNT: CPT | Mod: S$GLB,,, | Performed by: NURSE PRACTITIONER

## 2021-06-07 PROCEDURE — 80061 LIPID PANEL: CPT | Performed by: NURSE PRACTITIONER

## 2021-06-07 RX ORDER — SERTRALINE HYDROCHLORIDE 50 MG/1
50 TABLET, FILM COATED ORAL DAILY
Qty: 30 TABLET | Refills: 11 | Status: SHIPPED | OUTPATIENT
Start: 2021-06-07 | End: 2021-07-09 | Stop reason: ALTCHOICE

## 2021-06-10 DIAGNOSIS — E55.9 VITAMIN D INSUFFICIENCY: Primary | ICD-10-CM

## 2021-06-10 RX ORDER — ERGOCALCIFEROL 1.25 MG/1
50000 CAPSULE ORAL
Qty: 12 CAPSULE | Refills: 0 | Status: SHIPPED | OUTPATIENT
Start: 2021-06-10 | End: 2021-09-07 | Stop reason: SDUPTHER

## 2021-06-11 ENCOUNTER — TELEPHONE (OUTPATIENT)
Dept: FAMILY MEDICINE | Facility: CLINIC | Age: 61
End: 2021-06-11

## 2021-07-01 ENCOUNTER — TELEPHONE (OUTPATIENT)
Dept: FAMILY MEDICINE | Facility: CLINIC | Age: 61
End: 2021-07-01

## 2021-07-09 ENCOUNTER — OFFICE VISIT (OUTPATIENT)
Dept: FAMILY MEDICINE | Facility: CLINIC | Age: 61
End: 2021-07-09
Payer: COMMERCIAL

## 2021-07-09 VITALS
BODY MASS INDEX: 30.8 KG/M2 | DIASTOLIC BLOOD PRESSURE: 78 MMHG | HEIGHT: 73 IN | TEMPERATURE: 98 F | WEIGHT: 232.38 LBS | HEART RATE: 79 BPM | OXYGEN SATURATION: 96 % | SYSTOLIC BLOOD PRESSURE: 160 MMHG

## 2021-07-09 DIAGNOSIS — F32.A DEPRESSION, UNSPECIFIED DEPRESSION TYPE: Primary | ICD-10-CM

## 2021-07-09 DIAGNOSIS — I10 ESSENTIAL HYPERTENSION: ICD-10-CM

## 2021-07-09 DIAGNOSIS — E66.9 CLASS 1 OBESITY WITH BODY MASS INDEX (BMI) OF 30.0 TO 30.9 IN ADULT, UNSPECIFIED OBESITY TYPE, UNSPECIFIED WHETHER SERIOUS COMORBIDITY PRESENT: ICD-10-CM

## 2021-07-09 PROCEDURE — 99214 PR OFFICE/OUTPT VISIT, EST, LEVL IV, 30-39 MIN: ICD-10-PCS | Mod: S$GLB,,, | Performed by: NURSE PRACTITIONER

## 2021-07-09 PROCEDURE — 99214 OFFICE O/P EST MOD 30 MIN: CPT | Mod: S$GLB,,, | Performed by: NURSE PRACTITIONER

## 2021-07-09 PROCEDURE — 1126F AMNT PAIN NOTED NONE PRSNT: CPT | Mod: S$GLB,,, | Performed by: NURSE PRACTITIONER

## 2021-07-09 PROCEDURE — 1126F PR PAIN SEVERITY QUANTIFIED, NO PAIN PRESENT: ICD-10-PCS | Mod: S$GLB,,, | Performed by: NURSE PRACTITIONER

## 2021-07-09 PROCEDURE — 99999 PR PBB SHADOW E&M-EST. PATIENT-LVL III: CPT | Mod: PBBFAC,,, | Performed by: NURSE PRACTITIONER

## 2021-07-09 PROCEDURE — 99999 PR PBB SHADOW E&M-EST. PATIENT-LVL III: ICD-10-PCS | Mod: PBBFAC,,, | Performed by: NURSE PRACTITIONER

## 2021-07-09 PROCEDURE — 3008F BODY MASS INDEX DOCD: CPT | Mod: CPTII,S$GLB,, | Performed by: NURSE PRACTITIONER

## 2021-07-09 PROCEDURE — 3008F PR BODY MASS INDEX (BMI) DOCUMENTED: ICD-10-PCS | Mod: CPTII,S$GLB,, | Performed by: NURSE PRACTITIONER

## 2021-07-09 RX ORDER — FLUOXETINE HYDROCHLORIDE 20 MG/1
20 CAPSULE ORAL DAILY
Qty: 30 CAPSULE | Refills: 11 | Status: SHIPPED | OUTPATIENT
Start: 2021-07-09 | End: 2022-01-04

## 2021-07-09 RX ORDER — AMLODIPINE BESYLATE 10 MG/1
10 TABLET ORAL DAILY
Qty: 30 TABLET | Refills: 11 | Status: SHIPPED | OUTPATIENT
Start: 2021-07-09 | End: 2023-05-19 | Stop reason: SDUPTHER

## 2021-07-27 ENCOUNTER — CLINICAL SUPPORT (OUTPATIENT)
Dept: FAMILY MEDICINE | Facility: CLINIC | Age: 61
End: 2021-07-27
Payer: COMMERCIAL

## 2021-07-27 VITALS
SYSTOLIC BLOOD PRESSURE: 138 MMHG | DIASTOLIC BLOOD PRESSURE: 86 MMHG | HEART RATE: 86 BPM | OXYGEN SATURATION: 96 % | HEIGHT: 73 IN | BODY MASS INDEX: 30.66 KG/M2

## 2021-07-27 PROCEDURE — 99999 PR PBB SHADOW E&M-EST. PATIENT-LVL II: ICD-10-PCS | Mod: PBBFAC,,,

## 2021-07-27 PROCEDURE — 99999 PR PBB SHADOW E&M-EST. PATIENT-LVL II: CPT | Mod: PBBFAC,,,

## 2021-07-28 ENCOUNTER — TELEPHONE (OUTPATIENT)
Dept: FAMILY MEDICINE | Facility: CLINIC | Age: 61
End: 2021-07-28

## 2021-07-29 ENCOUNTER — LAB VISIT (OUTPATIENT)
Dept: URGENT CARE | Facility: CLINIC | Age: 61
End: 2021-07-29
Payer: COMMERCIAL

## 2021-07-29 DIAGNOSIS — R53.83 FATIGUE: ICD-10-CM

## 2021-07-29 DIAGNOSIS — R43.2 LOSS OF TASTE: ICD-10-CM

## 2021-07-29 DIAGNOSIS — R05.9 COUGH: ICD-10-CM

## 2021-07-29 DIAGNOSIS — R09.81 STUFFY NOSE: ICD-10-CM

## 2021-07-29 PROCEDURE — U0005 INFEC AGEN DETEC AMPLI PROBE: HCPCS | Performed by: EMERGENCY MEDICINE

## 2021-07-29 PROCEDURE — U0003 INFECTIOUS AGENT DETECTION BY NUCLEIC ACID (DNA OR RNA); SEVERE ACUTE RESPIRATORY SYNDROME CORONAVIRUS 2 (SARS-COV-2) (CORONAVIRUS DISEASE [COVID-19]), AMPLIFIED PROBE TECHNIQUE, MAKING USE OF HIGH THROUGHPUT TECHNOLOGIES AS DESCRIBED BY CMS-2020-01-R: HCPCS | Performed by: EMERGENCY MEDICINE

## 2021-07-30 DIAGNOSIS — J22 LOWER RESPIRATORY TRACT INFECTION DUE TO COVID-19 VIRUS: Primary | ICD-10-CM

## 2021-07-30 DIAGNOSIS — J45.20 MILD INTERMITTENT ASTHMA WITHOUT COMPLICATION: ICD-10-CM

## 2021-07-30 DIAGNOSIS — U07.1 LOWER RESPIRATORY TRACT INFECTION DUE TO COVID-19 VIRUS: Primary | ICD-10-CM

## 2021-07-30 LAB
SARS-COV-2 RNA RESP QL NAA+PROBE: DETECTED
SARS-COV-2- CYCLE NUMBER: 20.03

## 2021-08-02 ENCOUNTER — TELEPHONE (OUTPATIENT)
Dept: FAMILY MEDICINE | Facility: CLINIC | Age: 61
End: 2021-08-02

## 2021-08-02 RX ORDER — ALBUTEROL SULFATE 90 UG/1
2 AEROSOL, METERED RESPIRATORY (INHALATION) EVERY 6 HOURS PRN
Qty: 18 G | Refills: 11 | Status: SHIPPED | OUTPATIENT
Start: 2021-08-02 | End: 2021-09-05 | Stop reason: ALTCHOICE

## 2021-08-03 ENCOUNTER — INFUSION (OUTPATIENT)
Dept: INFECTIOUS DISEASES | Facility: HOSPITAL | Age: 61
End: 2021-08-03
Attending: FAMILY MEDICINE
Payer: COMMERCIAL

## 2021-08-03 VITALS
DIASTOLIC BLOOD PRESSURE: 78 MMHG | SYSTOLIC BLOOD PRESSURE: 156 MMHG | HEART RATE: 64 BPM | TEMPERATURE: 98 F | RESPIRATION RATE: 20 BRPM | OXYGEN SATURATION: 98 %

## 2021-08-03 DIAGNOSIS — J22 LOWER RESPIRATORY TRACT INFECTION DUE TO COVID-19 VIRUS: Primary | ICD-10-CM

## 2021-08-03 DIAGNOSIS — U07.1 LOWER RESPIRATORY TRACT INFECTION DUE TO COVID-19 VIRUS: Primary | ICD-10-CM

## 2021-08-03 PROCEDURE — 63600175 PHARM REV CODE 636 W HCPCS: Performed by: FAMILY MEDICINE

## 2021-08-03 PROCEDURE — M0243 CASIRIVI AND IMDEVI INFUSION: HCPCS | Performed by: FAMILY MEDICINE

## 2021-08-03 PROCEDURE — 25000003 PHARM REV CODE 250: Performed by: FAMILY MEDICINE

## 2021-08-03 PROCEDURE — A4216 STERILE WATER/SALINE, 10 ML: HCPCS | Performed by: FAMILY MEDICINE

## 2021-08-03 RX ORDER — DIPHENHYDRAMINE HYDROCHLORIDE 50 MG/ML
25 INJECTION INTRAMUSCULAR; INTRAVENOUS ONCE AS NEEDED
Status: DISCONTINUED | OUTPATIENT
Start: 2021-08-03 | End: 2021-09-05 | Stop reason: HOSPADM

## 2021-08-03 RX ORDER — ONDANSETRON 4 MG/1
4 TABLET, ORALLY DISINTEGRATING ORAL ONCE AS NEEDED
Status: DISCONTINUED | OUTPATIENT
Start: 2021-08-03 | End: 2021-09-05 | Stop reason: HOSPADM

## 2021-08-03 RX ORDER — ALBUTEROL SULFATE 90 UG/1
2 AEROSOL, METERED RESPIRATORY (INHALATION) EVERY 4 HOURS PRN
OUTPATIENT
Start: 2021-08-03

## 2021-08-03 RX ORDER — SODIUM CHLORIDE 0.9 % (FLUSH) 0.9 %
10 SYRINGE (ML) INJECTION
Status: DISCONTINUED | OUTPATIENT
Start: 2021-08-03 | End: 2021-09-05 | Stop reason: HOSPADM

## 2021-08-03 RX ORDER — ALBUTEROL SULFATE 90 UG/1
2 AEROSOL, METERED RESPIRATORY (INHALATION)
Status: DISCONTINUED | OUTPATIENT
Start: 2021-08-03 | End: 2021-09-05 | Stop reason: HOSPADM

## 2021-08-03 RX ORDER — EPINEPHRINE 0.3 MG/.3ML
0.3 INJECTION SUBCUTANEOUS
Status: DISCONTINUED | OUTPATIENT
Start: 2021-08-03 | End: 2021-09-05 | Stop reason: HOSPADM

## 2021-08-03 RX ORDER — ACETAMINOPHEN 325 MG/1
650 TABLET ORAL ONCE AS NEEDED
Status: DISCONTINUED | OUTPATIENT
Start: 2021-08-03 | End: 2021-09-05 | Stop reason: HOSPADM

## 2021-08-03 RX ADMIN — CASIRIVIMAB 600 MG: 1332 INJECTION, SOLUTION, CONCENTRATE INTRAVENOUS at 09:08

## 2021-08-03 RX ADMIN — SODIUM CHLORIDE: 0.9 INJECTION, SOLUTION INTRAVENOUS at 09:08

## 2021-08-03 RX ADMIN — SODIUM CHLORIDE, PRESERVATIVE FREE 10 ML: 5 INJECTION INTRAVENOUS at 09:08

## 2021-08-04 DIAGNOSIS — J45.20 MILD INTERMITTENT ASTHMA WITHOUT COMPLICATION: ICD-10-CM

## 2021-08-04 RX ORDER — MONTELUKAST SODIUM 10 MG/1
TABLET ORAL
Qty: 90 TABLET | Refills: 3 | Status: SHIPPED | OUTPATIENT
Start: 2021-08-04 | End: 2022-03-03 | Stop reason: SDUPTHER

## 2021-08-09 ENCOUNTER — TELEPHONE (OUTPATIENT)
Dept: FAMILY MEDICINE | Facility: CLINIC | Age: 61
End: 2021-08-09

## 2021-08-11 ENCOUNTER — TELEPHONE (OUTPATIENT)
Dept: FAMILY MEDICINE | Facility: CLINIC | Age: 61
End: 2021-08-11

## 2021-09-02 ENCOUNTER — TELEPHONE (OUTPATIENT)
Dept: FAMILY MEDICINE | Facility: CLINIC | Age: 61
End: 2021-09-02

## 2021-09-02 ENCOUNTER — PATIENT MESSAGE (OUTPATIENT)
Dept: FAMILY MEDICINE | Facility: CLINIC | Age: 61
End: 2021-09-02

## 2021-09-03 ENCOUNTER — OFFICE VISIT (OUTPATIENT)
Dept: FAMILY MEDICINE | Facility: CLINIC | Age: 61
End: 2021-09-03
Payer: COMMERCIAL

## 2021-09-03 ENCOUNTER — LAB VISIT (OUTPATIENT)
Dept: LAB | Facility: HOSPITAL | Age: 61
End: 2021-09-03
Attending: NURSE PRACTITIONER
Payer: COMMERCIAL

## 2021-09-03 VITALS
BODY MASS INDEX: 30.04 KG/M2 | SYSTOLIC BLOOD PRESSURE: 122 MMHG | OXYGEN SATURATION: 95 % | WEIGHT: 226.63 LBS | HEART RATE: 84 BPM | HEIGHT: 73 IN | TEMPERATURE: 98 F | DIASTOLIC BLOOD PRESSURE: 70 MMHG

## 2021-09-03 DIAGNOSIS — J45.909 ASTHMA WITH ALLERGIC RHINITIS, UNSPECIFIED ASTHMA SEVERITY, UNSPECIFIED WHETHER COMPLICATED: Primary | ICD-10-CM

## 2021-09-03 DIAGNOSIS — E55.9 VITAMIN D INSUFFICIENCY: ICD-10-CM

## 2021-09-03 LAB — 25(OH)D3+25(OH)D2 SERPL-MCNC: 26 NG/ML (ref 30–96)

## 2021-09-03 PROCEDURE — 3074F PR MOST RECENT SYSTOLIC BLOOD PRESSURE < 130 MM HG: ICD-10-PCS | Mod: CPTII,S$GLB,, | Performed by: FAMILY MEDICINE

## 2021-09-03 PROCEDURE — 96372 PR INJECTION,THERAP/PROPH/DIAG2ST, IM OR SUBCUT: ICD-10-PCS | Mod: 59,S$GLB,, | Performed by: FAMILY MEDICINE

## 2021-09-03 PROCEDURE — 99214 OFFICE O/P EST MOD 30 MIN: CPT | Mod: 25,S$GLB,, | Performed by: FAMILY MEDICINE

## 2021-09-03 PROCEDURE — 99999 PR PBB SHADOW E&M-EST. PATIENT-LVL IV: ICD-10-PCS | Mod: PBBFAC,,, | Performed by: FAMILY MEDICINE

## 2021-09-03 PROCEDURE — 94640 PR INHAL RX, AIRWAY OBST/DX SPUTUM INDUCT: ICD-10-PCS | Mod: S$GLB,,, | Performed by: FAMILY MEDICINE

## 2021-09-03 PROCEDURE — 36415 COLL VENOUS BLD VENIPUNCTURE: CPT | Mod: PO | Performed by: NURSE PRACTITIONER

## 2021-09-03 PROCEDURE — 94640 AIRWAY INHALATION TREATMENT: CPT | Mod: S$GLB,,, | Performed by: FAMILY MEDICINE

## 2021-09-03 PROCEDURE — 3078F DIAST BP <80 MM HG: CPT | Mod: CPTII,S$GLB,, | Performed by: FAMILY MEDICINE

## 2021-09-03 PROCEDURE — 1160F RVW MEDS BY RX/DR IN RCRD: CPT | Mod: CPTII,S$GLB,, | Performed by: FAMILY MEDICINE

## 2021-09-03 PROCEDURE — 99999 PR PBB SHADOW E&M-EST. PATIENT-LVL IV: CPT | Mod: PBBFAC,,, | Performed by: FAMILY MEDICINE

## 2021-09-03 PROCEDURE — 3074F SYST BP LT 130 MM HG: CPT | Mod: CPTII,S$GLB,, | Performed by: FAMILY MEDICINE

## 2021-09-03 PROCEDURE — 99214 PR OFFICE/OUTPT VISIT, EST, LEVL IV, 30-39 MIN: ICD-10-PCS | Mod: 25,S$GLB,, | Performed by: FAMILY MEDICINE

## 2021-09-03 PROCEDURE — 3078F PR MOST RECENT DIASTOLIC BLOOD PRESSURE < 80 MM HG: ICD-10-PCS | Mod: CPTII,S$GLB,, | Performed by: FAMILY MEDICINE

## 2021-09-03 PROCEDURE — 1159F MED LIST DOCD IN RCRD: CPT | Mod: CPTII,S$GLB,, | Performed by: FAMILY MEDICINE

## 2021-09-03 PROCEDURE — 1159F PR MEDICATION LIST DOCUMENTED IN MEDICAL RECORD: ICD-10-PCS | Mod: CPTII,S$GLB,, | Performed by: FAMILY MEDICINE

## 2021-09-03 PROCEDURE — 1160F PR REVIEW ALL MEDS BY PRESCRIBER/CLIN PHARMACIST DOCUMENTED: ICD-10-PCS | Mod: CPTII,S$GLB,, | Performed by: FAMILY MEDICINE

## 2021-09-03 PROCEDURE — 96372 THER/PROPH/DIAG INJ SC/IM: CPT | Mod: 59,S$GLB,, | Performed by: FAMILY MEDICINE

## 2021-09-03 PROCEDURE — 82306 VITAMIN D 25 HYDROXY: CPT | Performed by: NURSE PRACTITIONER

## 2021-09-03 PROCEDURE — 3008F PR BODY MASS INDEX (BMI) DOCUMENTED: ICD-10-PCS | Mod: CPTII,S$GLB,, | Performed by: FAMILY MEDICINE

## 2021-09-03 PROCEDURE — 3008F BODY MASS INDEX DOCD: CPT | Mod: CPTII,S$GLB,, | Performed by: FAMILY MEDICINE

## 2021-09-03 RX ORDER — BUDESONIDE 0.5 MG/2ML
0.5 INHALANT ORAL DAILY
Qty: 100 ML | Refills: 3 | Status: SHIPPED | OUTPATIENT
Start: 2021-09-03 | End: 2022-03-03 | Stop reason: SDUPTHER

## 2021-09-03 RX ORDER — FLUTICASONE FUROATE AND VILANTEROL TRIFENATATE 100; 25 UG/1; UG/1
1 POWDER RESPIRATORY (INHALATION) DAILY
Qty: 1 EACH | Refills: 11 | Status: SHIPPED | OUTPATIENT
Start: 2021-09-03

## 2021-09-03 RX ORDER — LEVALBUTEROL INHALATION SOLUTION 1.25 MG/3ML
1 SOLUTION RESPIRATORY (INHALATION) EVERY 4 HOURS PRN
Qty: 1 BOX | Refills: 3 | Status: SHIPPED | OUTPATIENT
Start: 2021-09-03 | End: 2023-05-25 | Stop reason: SDUPTHER

## 2021-09-03 RX ORDER — PREDNISONE 20 MG/1
20 TABLET ORAL DAILY
Qty: 20 TABLET | Refills: 0 | Status: SHIPPED | OUTPATIENT
Start: 2021-09-03 | End: 2022-03-23 | Stop reason: ALTCHOICE

## 2021-09-03 RX ORDER — ALBUTEROL SULFATE 0.83 MG/ML
2.5 SOLUTION RESPIRATORY (INHALATION)
Status: COMPLETED | OUTPATIENT
Start: 2021-09-03 | End: 2021-09-03

## 2021-09-03 RX ORDER — DEXAMETHASONE 4 MG/1
4 TABLET ORAL EVERY 12 HOURS
Qty: 20 TABLET | Refills: 0 | Status: CANCELLED | OUTPATIENT
Start: 2021-09-03 | End: 2021-09-13

## 2021-09-03 RX ORDER — DEXAMETHASONE SODIUM PHOSPHATE 4 MG/ML
4 INJECTION, SOLUTION INTRA-ARTICULAR; INTRALESIONAL; INTRAMUSCULAR; INTRAVENOUS; SOFT TISSUE
Status: DISCONTINUED | OUTPATIENT
Start: 2021-09-03 | End: 2021-09-03

## 2021-09-03 RX ORDER — DEXAMETHASONE SODIUM PHOSPHATE 4 MG/ML
4 INJECTION, SOLUTION INTRA-ARTICULAR; INTRALESIONAL; INTRAMUSCULAR; INTRAVENOUS; SOFT TISSUE
Status: COMPLETED | OUTPATIENT
Start: 2021-09-03 | End: 2021-09-03

## 2021-09-03 RX ADMIN — ALBUTEROL SULFATE 2.5 MG: 0.83 SOLUTION RESPIRATORY (INHALATION) at 09:09

## 2021-09-03 RX ADMIN — DEXAMETHASONE SODIUM PHOSPHATE 4 MG: 4 INJECTION, SOLUTION INTRA-ARTICULAR; INTRALESIONAL; INTRAMUSCULAR; INTRAVENOUS; SOFT TISSUE at 07:09

## 2021-09-07 DIAGNOSIS — E55.9 VITAMIN D INSUFFICIENCY: ICD-10-CM

## 2021-09-07 RX ORDER — ERGOCALCIFEROL 1.25 MG/1
50000 CAPSULE ORAL
Qty: 12 CAPSULE | Refills: 0 | Status: SHIPPED | OUTPATIENT
Start: 2021-09-07 | End: 2022-07-25 | Stop reason: ALTCHOICE

## 2021-09-09 ENCOUNTER — TELEPHONE (OUTPATIENT)
Dept: FAMILY MEDICINE | Facility: CLINIC | Age: 61
End: 2021-09-09

## 2021-10-19 ENCOUNTER — TELEPHONE (OUTPATIENT)
Dept: FAMILY MEDICINE | Facility: CLINIC | Age: 61
End: 2021-10-19

## 2022-01-04 ENCOUNTER — OFFICE VISIT (OUTPATIENT)
Dept: FAMILY MEDICINE | Facility: CLINIC | Age: 62
End: 2022-01-04
Payer: COMMERCIAL

## 2022-01-04 VITALS
DIASTOLIC BLOOD PRESSURE: 74 MMHG | BODY MASS INDEX: 29.95 KG/M2 | OXYGEN SATURATION: 98 % | RESPIRATION RATE: 16 BRPM | HEIGHT: 73 IN | WEIGHT: 226 LBS | HEART RATE: 65 BPM | SYSTOLIC BLOOD PRESSURE: 136 MMHG | TEMPERATURE: 98 F

## 2022-01-04 DIAGNOSIS — J45.20 MILD INTERMITTENT ASTHMA WITHOUT COMPLICATION: ICD-10-CM

## 2022-01-04 DIAGNOSIS — Z23 NEED FOR ZOSTER VACCINE: ICD-10-CM

## 2022-01-04 DIAGNOSIS — E78.2 MIXED HYPERLIPIDEMIA: ICD-10-CM

## 2022-01-04 DIAGNOSIS — F32.A DEPRESSION, UNSPECIFIED DEPRESSION TYPE: ICD-10-CM

## 2022-01-04 DIAGNOSIS — I10 ESSENTIAL HYPERTENSION: Primary | ICD-10-CM

## 2022-01-04 PROCEDURE — 99214 OFFICE O/P EST MOD 30 MIN: CPT | Mod: 25,S$GLB,, | Performed by: FAMILY MEDICINE

## 2022-01-04 PROCEDURE — 3075F PR MOST RECENT SYSTOLIC BLOOD PRESS GE 130-139MM HG: ICD-10-PCS | Mod: CPTII,S$GLB,, | Performed by: FAMILY MEDICINE

## 2022-01-04 PROCEDURE — 3008F BODY MASS INDEX DOCD: CPT | Mod: CPTII,S$GLB,, | Performed by: FAMILY MEDICINE

## 2022-01-04 PROCEDURE — 99999 PR PBB SHADOW E&M-EST. PATIENT-LVL V: ICD-10-PCS | Mod: PBBFAC,,, | Performed by: FAMILY MEDICINE

## 2022-01-04 PROCEDURE — 1159F PR MEDICATION LIST DOCUMENTED IN MEDICAL RECORD: ICD-10-PCS | Mod: CPTII,S$GLB,, | Performed by: FAMILY MEDICINE

## 2022-01-04 PROCEDURE — 90750 HZV VACC RECOMBINANT IM: CPT | Mod: S$GLB,,, | Performed by: FAMILY MEDICINE

## 2022-01-04 PROCEDURE — 3078F PR MOST RECENT DIASTOLIC BLOOD PRESSURE < 80 MM HG: ICD-10-PCS | Mod: CPTII,S$GLB,, | Performed by: FAMILY MEDICINE

## 2022-01-04 PROCEDURE — 3075F SYST BP GE 130 - 139MM HG: CPT | Mod: CPTII,S$GLB,, | Performed by: FAMILY MEDICINE

## 2022-01-04 PROCEDURE — 3078F DIAST BP <80 MM HG: CPT | Mod: CPTII,S$GLB,, | Performed by: FAMILY MEDICINE

## 2022-01-04 PROCEDURE — 90471 ZOSTER RECOMBINANT VACCINE: ICD-10-PCS | Mod: S$GLB,,, | Performed by: FAMILY MEDICINE

## 2022-01-04 PROCEDURE — 90471 IMMUNIZATION ADMIN: CPT | Mod: S$GLB,,, | Performed by: FAMILY MEDICINE

## 2022-01-04 PROCEDURE — 90750 ZOSTER RECOMBINANT VACCINE: ICD-10-PCS | Mod: S$GLB,,, | Performed by: FAMILY MEDICINE

## 2022-01-04 PROCEDURE — 99999 PR PBB SHADOW E&M-EST. PATIENT-LVL V: CPT | Mod: PBBFAC,,, | Performed by: FAMILY MEDICINE

## 2022-01-04 PROCEDURE — 3008F PR BODY MASS INDEX (BMI) DOCUMENTED: ICD-10-PCS | Mod: CPTII,S$GLB,, | Performed by: FAMILY MEDICINE

## 2022-01-04 PROCEDURE — 99214 PR OFFICE/OUTPT VISIT, EST, LEVL IV, 30-39 MIN: ICD-10-PCS | Mod: 25,S$GLB,, | Performed by: FAMILY MEDICINE

## 2022-01-04 PROCEDURE — 1160F RVW MEDS BY RX/DR IN RCRD: CPT | Mod: CPTII,S$GLB,, | Performed by: FAMILY MEDICINE

## 2022-01-04 PROCEDURE — 1159F MED LIST DOCD IN RCRD: CPT | Mod: CPTII,S$GLB,, | Performed by: FAMILY MEDICINE

## 2022-01-04 PROCEDURE — 1160F PR REVIEW ALL MEDS BY PRESCRIBER/CLIN PHARMACIST DOCUMENTED: ICD-10-PCS | Mod: CPTII,S$GLB,, | Performed by: FAMILY MEDICINE

## 2022-01-04 RX ORDER — ATORVASTATIN CALCIUM 20 MG/1
20 TABLET, FILM COATED ORAL DAILY
Qty: 90 TABLET | Refills: 3 | Status: SHIPPED | OUTPATIENT
Start: 2022-01-04 | End: 2023-03-26

## 2022-01-04 RX ORDER — FLUOXETINE HYDROCHLORIDE 40 MG/1
40 CAPSULE ORAL DAILY
Qty: 30 CAPSULE | Refills: 11 | Status: SHIPPED | OUTPATIENT
Start: 2022-01-04 | End: 2022-03-03 | Stop reason: SDUPTHER

## 2022-01-04 RX ORDER — ALBUTEROL SULFATE 90 UG/1
2 AEROSOL, METERED RESPIRATORY (INHALATION) EVERY 4 HOURS PRN
Qty: 18 G | OUTPATIENT
Start: 2022-01-04 | End: 2023-05-25 | Stop reason: ALTCHOICE

## 2022-01-04 NOTE — PATIENT INSTRUCTIONS
Patient Education       Asthma Action Plan     Teach Back: Helping You Understand   The Teach Back Method helps you understand the information we are giving you about your child. After talking with the staff, tell them in your own words what you were just told. This helps to make sure the staff has covered each thing clearly. It also helps to explain things that may have been a bit confusing. Before going home, make sure you are able to do these:  · I can tell you about my childs condition.  · I can tell you the difference between a rescue drug and a controller drug.  · I can tell you what I will do if my child is in the red zone.  Last Reviewed Date   2020-01-27  Consumer Information Use and Disclaimer   This information is not specific medical advice and does not replace information you receive from your health care provider. This is only a brief summary of general information. It does NOT include all information about conditions, illnesses, injuries, tests, procedures, treatments, therapies, discharge instructions or life-style choices that may apply to you. You must talk with your health care provider for complete information about your health and treatment options. This information should not be used to decide whether or not to accept your health care providers advice, instructions or recommendations. Only your health care provider has the knowledge and training to provide advice that is right for you.  Copyright   Copyright © 2021 [employer] and its affiliates and/or licensors. All rights reserved.

## 2022-01-04 NOTE — PROGRESS NOTES
Subjective:       Patient ID: Jaden Greene is a 61 y.o. male.    Chief Complaint: Follow-up    SUBJECTIVE: Jaden Greene is a 61 y.o. male seen for a follow up visit; he has hypertension, hyperlipidemia, dysmetabolic syndrome X and obesity.  Current Outpatient Medications:  amLODIPine (NORVASC) 10 MG tablet, Take 1 tablet (10 mg total) by mouth once daily., Disp: 30 tablet, Rfl: 11  budesonide (PULMICORT) 0.5 mg/2 mL nebulizer solution, Take 2 mLs (0.5 mg total) by nebulization once daily. Controller, Disp: 100 mL, Rfl: 3  chlorthalidone (HYGROTEN) 25 MG Tab, TAKE ONE TABLET BY MOUTH ONCE DAILY, Disp: 90 tablet, Rfl: 0  ergocalciferol (ERGOCALCIFEROL) 50,000 unit Cap, Take 1 capsule (50,000 Units total) by mouth every 7 days., Disp: 12 capsule, Rfl: 0  fluticasone furoate-vilanteroL (BREO ELLIPTA) 100-25 mcg/dose diskus inhaler, Inhale 1 puff into the lungs once daily. Controller, Disp: 1 each, Rfl: 11  levalbuterol (XOPENEX) 1.25 mg/3 mL nebulizer solution, Take 3 mLs (1.25 mg total) by nebulization every 4 (four) hours as needed for Wheezing., Disp: 1 Box, Rfl: 3  montelukast (SINGULAIR) 10 mg tablet, TAKE ONE TABLET BY MOUTH DAILY IN THE EVENING, Disp: 90 tablet, Rfl: 3  predniSONE (DELTASONE) 20 MG tablet, Take 1 tablet (20 mg total) by mouth once daily. 1 tab po tid for 3 days. 1 tab po bid for 3 days. Then 1 tab po daily for 3 days. Then 1/2 tab po daily for 3 days., Disp: 20 tablet, Rfl: 0  albuterol (PROAIR HFA) 90 mcg/actuation inhaler, Inhale 2 puffs into the lungs every 4 (four) hours as needed for Wheezing. PRN, Disp: 18 g, Rfl: 3.  atorvastatin (LIPITOR) 20 MG tablet, Take 1 tablet (20 mg total) by mouth once daily., Disp: 90 tablet, Rfl: 3  FLUoxetine 40 MG capsule, Take 1 capsule (40 mg total) by mouth once daily., Disp: 30 capsule, Rfl: 11  omeprazole (PRILOSEC) 40 MG capsule, Take 1 capsule (40 mg total) by mouth once daily., Disp: 30 capsule, Rfl: 11    No current facility-administered  medications for this visit.    Patient Active Problem List:     Hyperlipidemia     Obesity, unspecified     BMI 31.0-31.9,adult     Essential hypertension     Gastroesophageal reflux disease     Mild persistent asthma without complication    System Review: Cardiovascular risk analysis - LDL goal is under 130  hypertension  hyperlipidemia  sedentary lifestyle  [place] 10-year risk 10-20%, Cardiovascular ROS - not taking medications regularly as instructed, no medication side effects noted, no TIA's, no chest pain on exertion, no dyspnea on exertion, no swelling of ankles, no orthostatic dizziness or lightheadedness, no orthopnea or paroxysmal nocturnal dyspnea, noting orthopnea, no palpitations and no erectile dysfunction.      Asthma  He complains of chest tightness, cough and wheezing. There is no frequent throat clearing, hemoptysis, hoarse voice or shortness of breath. This is a recurrent (season triggered) problem. The problem has been gradually improving. The cough is non-productive. Associated symptoms include nasal congestion, postnasal drip and sneezing. Pertinent negatives include no chest pain or headaches. His symptoms are aggravated by change in weather and exposure to smoke (cat/rugs/smokers). His symptoms are alleviated by beta-agonist and oral steroids. He reports significant improvement on treatment. His symptoms are not alleviated by cold air. Risk factors for lung disease include animal exposure. His past medical history is significant for asthma.     Review of Systems   Constitutional: Negative for fatigue and unexpected weight change.        20 pounds weight lost.   HENT: Positive for postnasal drip and sneezing. Negative for hoarse voice.    Respiratory: Positive for cough and wheezing. Negative for hemoptysis, chest tightness and shortness of breath.    Cardiovascular: Negative for chest pain, palpitations and leg swelling.   Gastrointestinal: Negative for abdominal pain.   Musculoskeletal:  Negative for arthralgias.   Neurological: Negative for dizziness, syncope, light-headedness and headaches.   Psychiatric/Behavioral: Positive for decreased concentration and dysphoric mood.       Patient Active Problem List   Diagnosis    Hyperlipidemia    Obesity, unspecified    BMI 31.0-31.9,adult    Essential hypertension    Gastroesophageal reflux disease    Mild persistent asthma without complication       Objective:      Physical Exam  Vitals reviewed.   Constitutional:       General: He is not in acute distress.     Appearance: He is well-developed and well-nourished. He is not diaphoretic.   HENT:      Head: Normocephalic and atraumatic.      Right Ear: External ear normal.      Left Ear: External ear normal.      Nose: Nose normal.      Mouth/Throat:      Mouth: Oropharynx is clear and moist.      Pharynx: No oropharyngeal exudate.   Eyes:      General: No scleral icterus.        Right eye: No discharge.         Left eye: No discharge.      Extraocular Movements: EOM normal.      Conjunctiva/sclera: Conjunctivae normal.      Pupils: Pupils are equal, round, and reactive to light.   Neck:      Thyroid: No thyromegaly.      Vascular: No JVD.      Trachea: No tracheal deviation.   Cardiovascular:      Rate and Rhythm: Normal rate.      Pulses: Intact distal pulses.      Heart sounds: Normal heart sounds. No murmur heard.      Pulmonary:      Effort: Pulmonary effort is normal. No respiratory distress.      Breath sounds: No decreased air movement or transmitted upper airway sounds. Examination of the right-middle field reveals wheezing. Examination of the left-middle field reveals wheezing. Wheezing present. No rales.   Abdominal:      General: Bowel sounds are normal. There is no distension.      Palpations: Abdomen is soft. There is no mass.      Tenderness: There is no abdominal tenderness. There is no guarding or rebound.   Musculoskeletal:         General: No tenderness or edema.      Cervical back:  Normal range of motion and neck supple.      Comments:        Lymphadenopathy:      Cervical: No cervical adenopathy.   Skin:     General: Skin is warm and dry.      Coloration: Skin is not pale.      Findings: No erythema or rash.   Neurological:      Mental Status: He is alert and oriented to person, place, and time.      Cranial Nerves: No cranial nerve deficit.      Coordination: Coordination normal.   Psychiatric:         Attention and Perception: Attention normal.         Mood and Affect: Mood is depressed. Affect is labile.         Behavior: Behavior normal. Behavior is cooperative.         Thought Content: Thought content normal.         Cognition and Memory: Cognition and memory normal.         Judgment: Judgment normal.         Lab Results   Component Value Date    WBC 10.78 06/07/2021    HGB 16.2 06/07/2021    HCT 47.7 06/07/2021     06/07/2021    CHOL 159 06/07/2021    TRIG 163 (H) 06/07/2021    HDL 47 06/07/2021    ALT 42 06/07/2021    AST 30 06/07/2021     06/07/2021    K 3.6 06/07/2021    CL 98 06/07/2021    CREATININE 1.0 06/07/2021    BUN 20 06/07/2021    CO2 30 (H) 06/07/2021    TSH 1.063 03/15/2013    PSA 0.16 06/07/2021     The 10-year ASCVD risk score (Ramonkayden LOPEZ Jr., et al., 2013) is: 9.5%    Values used to calculate the score:      Age: 61 years      Sex: Male      Is Non- : No      Diabetic: No      Tobacco smoker: No      Systolic Blood Pressure: 136 mmHg      Is BP treated: Yes      HDL Cholesterol: 47 mg/dL      Total Cholesterol: 159 mg/dL     Assessment:       1. Essential hypertension    2. Depression, unspecified depression type    3. Hyperlipidemia    4. Mild intermittent asthma without complication    5. BMI 31.0-31.9,adult    6. Need for zoster vaccine        Plan:       Essential hypertension    Depression, unspecified depression type  -     FLUoxetine 40 MG capsule; Take 1 capsule (40 mg total) by mouth once daily.  Dispense: 30 capsule; Refill:  11    Hyperlipidemia  -     atorvastatin (LIPITOR) 20 MG tablet; Take 1 tablet (20 mg total) by mouth once daily.  Dispense: 90 tablet; Refill: 3  -     Lipid Panel; Future; Expected date: 01/04/2022  -     Comprehensive Metabolic Panel; Future; Expected date: 01/04/2022  -     CBC Auto Differential; Future; Expected date: 01/04/2022    Mild intermittent asthma without complication  -     albuterol (PROAIR HFA) 90 mcg/actuation inhaler; Inhale 2 puffs into the lungs every 4 (four) hours as needed for Wheezing. PRN  Dispense: 18 g; Refill: 3.  -     Complete PFT w/ bronchodilator; Future    BMI 31.0-31.9,adult    Need for zoster vaccine  -     (In Office Administered) Zoster Recombinant Vaccine        Did patient meet goals/outcomes: BP logs. The patient is asked to make an attempt to improve diet and exercise patterns to aid in medical management of this problem.  I have reviewed the care of asthma with the patient today. The pathophysiology of asthma is explained.  We've discussed the importance of compliance with medical regimen and the important differences between the various treatment modalities, such as beta agonists and inhaled steroids.  The concepts of prophylactic and episodic therapy has been discussed in detail.  The use of peak flow meters to monitor progress, annual flu shots, and  Asthma Education has been discussed. He indicates understanding of these issues. Nic Plascencia 1/4/2022 10:25 AM      The following self management tools provided: blood pressure log  excercise log    Patient Instructions (the written plan) was given to the patient/family.     Time spent with patient: 30 minutes    Barriers to medications present (yes )    Adverse reactions to current medications (no)    Over the counter medications reviewed (Yes)        30-35-minute visit. 10 minutes spent counseling patient on diet, exercise, and weight loss.  This has been fully explained to the patient, who indicates understanding.

## 2022-01-04 NOTE — PROGRESS NOTES
Patient verified by name and . Patient received 1st dose of Shingrix in right Deltoid. Patient tolerated injection well. Patient advised to wait in clinic for 15 minutes in case of adverse reactions. Patient demonstrated understanding.

## 2022-02-25 ENCOUNTER — LAB VISIT (OUTPATIENT)
Dept: LAB | Facility: HOSPITAL | Age: 62
End: 2022-02-25
Attending: FAMILY MEDICINE
Payer: COMMERCIAL

## 2022-02-25 DIAGNOSIS — E78.2 MIXED HYPERLIPIDEMIA: ICD-10-CM

## 2022-02-25 LAB
ALBUMIN SERPL BCP-MCNC: 3.9 G/DL (ref 3.5–5.2)
ALP SERPL-CCNC: 88 U/L (ref 55–135)
ALT SERPL W/O P-5'-P-CCNC: 49 U/L (ref 10–44)
ANION GAP SERPL CALC-SCNC: 13 MMOL/L (ref 8–16)
AST SERPL-CCNC: 31 U/L (ref 10–40)
BASOPHILS # BLD AUTO: 0.09 K/UL (ref 0–0.2)
BASOPHILS NFR BLD: 0.9 % (ref 0–1.9)
BILIRUB SERPL-MCNC: 0.8 MG/DL (ref 0.1–1)
BUN SERPL-MCNC: 16 MG/DL (ref 6–20)
CALCIUM SERPL-MCNC: 9.1 MG/DL (ref 8.7–10.5)
CHLORIDE SERPL-SCNC: 101 MMOL/L (ref 95–110)
CHOLEST SERPL-MCNC: 228 MG/DL (ref 120–199)
CHOLEST/HDLC SERPL: 5.1 {RATIO} (ref 2–5)
CO2 SERPL-SCNC: 26 MMOL/L (ref 23–29)
CREAT SERPL-MCNC: 0.8 MG/DL (ref 0.5–1.4)
DIFFERENTIAL METHOD: ABNORMAL
EOSINOPHIL # BLD AUTO: 0.4 K/UL (ref 0–0.5)
EOSINOPHIL NFR BLD: 3.5 % (ref 0–8)
ERYTHROCYTE [DISTWIDTH] IN BLOOD BY AUTOMATED COUNT: 12.3 % (ref 11.5–14.5)
EST. GFR  (AFRICAN AMERICAN): >60 ML/MIN/1.73 M^2
EST. GFR  (NON AFRICAN AMERICAN): >60 ML/MIN/1.73 M^2
GLUCOSE SERPL-MCNC: 145 MG/DL (ref 70–110)
HCT VFR BLD AUTO: 43.8 % (ref 40–54)
HDLC SERPL-MCNC: 45 MG/DL (ref 40–75)
HDLC SERPL: 19.7 % (ref 20–50)
HGB BLD-MCNC: 14.7 G/DL (ref 14–18)
IMM GRANULOCYTES # BLD AUTO: 0.05 K/UL (ref 0–0.04)
IMM GRANULOCYTES NFR BLD AUTO: 0.5 % (ref 0–0.5)
LDLC SERPL CALC-MCNC: 141.8 MG/DL (ref 63–159)
LYMPHOCYTES # BLD AUTO: 2.5 K/UL (ref 1–4.8)
LYMPHOCYTES NFR BLD: 24.8 % (ref 18–48)
MCH RBC QN AUTO: 31 PG (ref 27–31)
MCHC RBC AUTO-ENTMCNC: 33.6 G/DL (ref 32–36)
MCV RBC AUTO: 92 FL (ref 82–98)
MONOCYTES # BLD AUTO: 0.8 K/UL (ref 0.3–1)
MONOCYTES NFR BLD: 7.4 % (ref 4–15)
NEUTROPHILS # BLD AUTO: 6.4 K/UL (ref 1.8–7.7)
NEUTROPHILS NFR BLD: 62.9 % (ref 38–73)
NONHDLC SERPL-MCNC: 183 MG/DL
NRBC BLD-RTO: 0 /100 WBC
PLATELET # BLD AUTO: 317 K/UL (ref 150–450)
PMV BLD AUTO: 10.2 FL (ref 9.2–12.9)
POTASSIUM SERPL-SCNC: 3.8 MMOL/L (ref 3.5–5.1)
PROT SERPL-MCNC: 7.2 G/DL (ref 6–8.4)
RBC # BLD AUTO: 4.74 M/UL (ref 4.6–6.2)
SODIUM SERPL-SCNC: 140 MMOL/L (ref 136–145)
TRIGL SERPL-MCNC: 206 MG/DL (ref 30–150)
WBC # BLD AUTO: 10.11 K/UL (ref 3.9–12.7)

## 2022-02-25 PROCEDURE — 80061 LIPID PANEL: CPT | Performed by: FAMILY MEDICINE

## 2022-02-25 PROCEDURE — 80053 COMPREHEN METABOLIC PANEL: CPT | Performed by: FAMILY MEDICINE

## 2022-02-25 PROCEDURE — 36415 COLL VENOUS BLD VENIPUNCTURE: CPT | Mod: PO | Performed by: FAMILY MEDICINE

## 2022-02-25 PROCEDURE — 85025 COMPLETE CBC W/AUTO DIFF WBC: CPT | Performed by: FAMILY MEDICINE

## 2022-03-03 ENCOUNTER — OFFICE VISIT (OUTPATIENT)
Dept: FAMILY MEDICINE | Facility: CLINIC | Age: 62
End: 2022-03-03
Payer: COMMERCIAL

## 2022-03-03 VITALS
OXYGEN SATURATION: 98 % | SYSTOLIC BLOOD PRESSURE: 140 MMHG | DIASTOLIC BLOOD PRESSURE: 88 MMHG | WEIGHT: 227.94 LBS | RESPIRATION RATE: 17 BRPM | HEART RATE: 68 BPM | BODY MASS INDEX: 30.08 KG/M2 | TEMPERATURE: 98 F

## 2022-03-03 DIAGNOSIS — J45.20 MILD INTERMITTENT ASTHMA WITHOUT COMPLICATION: ICD-10-CM

## 2022-03-03 DIAGNOSIS — D12.6 ADENOMATOUS POLYP OF COLON, UNSPECIFIED PART OF COLON: ICD-10-CM

## 2022-03-03 DIAGNOSIS — R73.9 HYPERGLYCEMIA: Primary | ICD-10-CM

## 2022-03-03 DIAGNOSIS — J45.909 ASTHMA WITH ALLERGIC RHINITIS, UNSPECIFIED ASTHMA SEVERITY, UNSPECIFIED WHETHER COMPLICATED: ICD-10-CM

## 2022-03-03 DIAGNOSIS — Z00.00 ANNUAL PHYSICAL EXAM: ICD-10-CM

## 2022-03-03 DIAGNOSIS — Z23 NEED FOR ZOSTER VACCINE: ICD-10-CM

## 2022-03-03 DIAGNOSIS — K21.9 GASTROESOPHAGEAL REFLUX DISEASE WITHOUT ESOPHAGITIS: ICD-10-CM

## 2022-03-03 DIAGNOSIS — F32.A DEPRESSION, UNSPECIFIED DEPRESSION TYPE: ICD-10-CM

## 2022-03-03 PROCEDURE — 1160F PR REVIEW ALL MEDS BY PRESCRIBER/CLIN PHARMACIST DOCUMENTED: ICD-10-PCS | Mod: CPTII,S$GLB,, | Performed by: FAMILY MEDICINE

## 2022-03-03 PROCEDURE — 1159F PR MEDICATION LIST DOCUMENTED IN MEDICAL RECORD: ICD-10-PCS | Mod: CPTII,S$GLB,, | Performed by: FAMILY MEDICINE

## 2022-03-03 PROCEDURE — 90471 ZOSTER RECOMBINANT VACCINE: ICD-10-PCS | Mod: S$GLB,,, | Performed by: FAMILY MEDICINE

## 2022-03-03 PROCEDURE — 3008F BODY MASS INDEX DOCD: CPT | Mod: CPTII,S$GLB,, | Performed by: FAMILY MEDICINE

## 2022-03-03 PROCEDURE — 90750 HZV VACC RECOMBINANT IM: CPT | Mod: S$GLB,,, | Performed by: FAMILY MEDICINE

## 2022-03-03 PROCEDURE — 90750 ZOSTER RECOMBINANT VACCINE: ICD-10-PCS | Mod: S$GLB,,, | Performed by: FAMILY MEDICINE

## 2022-03-03 PROCEDURE — 3079F PR MOST RECENT DIASTOLIC BLOOD PRESSURE 80-89 MM HG: ICD-10-PCS | Mod: CPTII,S$GLB,, | Performed by: FAMILY MEDICINE

## 2022-03-03 PROCEDURE — 99999 PR PBB SHADOW E&M-EST. PATIENT-LVL IV: CPT | Mod: PBBFAC,,, | Performed by: FAMILY MEDICINE

## 2022-03-03 PROCEDURE — 1160F RVW MEDS BY RX/DR IN RCRD: CPT | Mod: CPTII,S$GLB,, | Performed by: FAMILY MEDICINE

## 2022-03-03 PROCEDURE — 3051F HG A1C>EQUAL 7.0%<8.0%: CPT | Mod: CPTII,S$GLB,, | Performed by: FAMILY MEDICINE

## 2022-03-03 PROCEDURE — 3077F SYST BP >= 140 MM HG: CPT | Mod: CPTII,S$GLB,, | Performed by: FAMILY MEDICINE

## 2022-03-03 PROCEDURE — 99999 PR PBB SHADOW E&M-EST. PATIENT-LVL IV: ICD-10-PCS | Mod: PBBFAC,,, | Performed by: FAMILY MEDICINE

## 2022-03-03 PROCEDURE — 1159F MED LIST DOCD IN RCRD: CPT | Mod: CPTII,S$GLB,, | Performed by: FAMILY MEDICINE

## 2022-03-03 PROCEDURE — 90471 IMMUNIZATION ADMIN: CPT | Mod: S$GLB,,, | Performed by: FAMILY MEDICINE

## 2022-03-03 PROCEDURE — 3008F PR BODY MASS INDEX (BMI) DOCUMENTED: ICD-10-PCS | Mod: CPTII,S$GLB,, | Performed by: FAMILY MEDICINE

## 2022-03-03 PROCEDURE — 3077F PR MOST RECENT SYSTOLIC BLOOD PRESSURE >= 140 MM HG: ICD-10-PCS | Mod: CPTII,S$GLB,, | Performed by: FAMILY MEDICINE

## 2022-03-03 PROCEDURE — 3051F PR MOST RECENT HEMOGLOBIN A1C LEVEL 7.0 - < 8.0%: ICD-10-PCS | Mod: CPTII,S$GLB,, | Performed by: FAMILY MEDICINE

## 2022-03-03 PROCEDURE — 99214 OFFICE O/P EST MOD 30 MIN: CPT | Mod: 25,S$GLB,, | Performed by: FAMILY MEDICINE

## 2022-03-03 PROCEDURE — 99214 PR OFFICE/OUTPT VISIT, EST, LEVL IV, 30-39 MIN: ICD-10-PCS | Mod: 25,S$GLB,, | Performed by: FAMILY MEDICINE

## 2022-03-03 PROCEDURE — 3079F DIAST BP 80-89 MM HG: CPT | Mod: CPTII,S$GLB,, | Performed by: FAMILY MEDICINE

## 2022-03-03 RX ORDER — BUDESONIDE 0.5 MG/2ML
0.5 INHALANT ORAL DAILY
Qty: 100 ML | Refills: 3 | Status: SHIPPED | OUTPATIENT
Start: 2022-03-03 | End: 2023-05-25 | Stop reason: SDUPTHER

## 2022-03-03 RX ORDER — MONTELUKAST SODIUM 10 MG/1
10 TABLET ORAL NIGHTLY
Qty: 90 TABLET | Refills: 3 | Status: SHIPPED | OUTPATIENT
Start: 2022-03-03 | End: 2023-03-26

## 2022-03-03 RX ORDER — FLUOXETINE HYDROCHLORIDE 40 MG/1
40 CAPSULE ORAL DAILY
Qty: 30 CAPSULE | Refills: 11 | Status: SHIPPED | OUTPATIENT
Start: 2022-03-03 | End: 2023-07-23 | Stop reason: SDUPTHER

## 2022-03-03 RX ORDER — OMEPRAZOLE 20 MG/1
20 CAPSULE, DELAYED RELEASE ORAL DAILY
Qty: 90 CAPSULE | Refills: 4 | Status: SHIPPED | OUTPATIENT
Start: 2022-03-03 | End: 2023-10-23

## 2022-03-03 NOTE — PROGRESS NOTES
Patient verified by name and . Patient received 2nd shingrix vaccine in right Deltoid per patient request. Patient tolerated injection well. Patient advised to wait in clinic for 15 minutes in case of adverse reactions. Patient demonstrated understanding.

## 2022-03-07 ENCOUNTER — LAB VISIT (OUTPATIENT)
Dept: LAB | Facility: HOSPITAL | Age: 62
End: 2022-03-07
Attending: FAMILY MEDICINE
Payer: COMMERCIAL

## 2022-03-07 DIAGNOSIS — R73.9 HYPERGLYCEMIA: ICD-10-CM

## 2022-03-07 LAB
ANION GAP SERPL CALC-SCNC: 13 MMOL/L (ref 8–16)
BUN SERPL-MCNC: 15 MG/DL (ref 6–20)
CALCIUM SERPL-MCNC: 8.9 MG/DL (ref 8.7–10.5)
CHLORIDE SERPL-SCNC: 103 MMOL/L (ref 95–110)
CO2 SERPL-SCNC: 24 MMOL/L (ref 23–29)
CREAT SERPL-MCNC: 0.8 MG/DL (ref 0.5–1.4)
EST. GFR  (AFRICAN AMERICAN): >60 ML/MIN/1.73 M^2
EST. GFR  (NON AFRICAN AMERICAN): >60 ML/MIN/1.73 M^2
ESTIMATED AVG GLUCOSE: 166 MG/DL (ref 68–131)
GLUCOSE SERPL-MCNC: 143 MG/DL (ref 70–110)
HBA1C MFR BLD: 7.4 % (ref 4–5.6)
POTASSIUM SERPL-SCNC: 3.7 MMOL/L (ref 3.5–5.1)
SODIUM SERPL-SCNC: 140 MMOL/L (ref 136–145)

## 2022-03-07 PROCEDURE — 80048 BASIC METABOLIC PNL TOTAL CA: CPT | Performed by: FAMILY MEDICINE

## 2022-03-07 PROCEDURE — 36415 COLL VENOUS BLD VENIPUNCTURE: CPT | Mod: PO | Performed by: FAMILY MEDICINE

## 2022-03-07 PROCEDURE — 83036 HEMOGLOBIN GLYCOSYLATED A1C: CPT | Performed by: FAMILY MEDICINE

## 2022-03-08 NOTE — PROGRESS NOTES
Diabetes not at [employer].The HGa1c goal is less than 6.5 or less. The patient is asked to make an attempt to improve diet and exercise patterns to aid in medical management of this problem.The next labs in 4 months.

## 2022-03-23 NOTE — PROGRESS NOTES
Subjective:       Patient ID: Jaden Greene is a 61 y.o. male.    Chief Complaint: No chief complaint on file.    Jaden Greene is a 61 y.o. male seen for a follow up visit; he has hypertension, hyperlipidemia, dysmetabolic syndrome X and obesity.  Patient denies any exertional chest pain, dyspnea, palpitations, syncope, orthopnea, edema or paroxysmal nocturnal dyspnea.  Active Diagnosis Review (HCC)          No HCC diagnoses found      Active Medical History:  No date: Asthma  No date: Hyperlipidemia  No date: Hypertension      Review of Systems   Constitutional: Negative for fatigue and unexpected weight change.   Respiratory: Negative for chest tightness and shortness of breath.    Cardiovascular: Negative for chest pain, palpitations and leg swelling.   Gastrointestinal: Negative for abdominal pain.   Musculoskeletal: Negative for arthralgias.   Neurological: Negative for dizziness, syncope, light-headedness and headaches.       Patient Active Problem List   Diagnosis    Hyperlipidemia    Obesity, unspecified    BMI 31.0-31.9,adult    Essential hypertension    Gastroesophageal reflux disease    Mild persistent asthma without complication       Objective:      Physical Exam  Vitals reviewed.   Constitutional:       General: He is not in acute distress.     Appearance: He is well-developed. He is obese. He is not diaphoretic.   HENT:      Head: Normocephalic and atraumatic.      Right Ear: External ear normal.      Left Ear: External ear normal.      Nose: Nose normal.      Mouth/Throat:      Pharynx: No oropharyngeal exudate.   Eyes:      General: No scleral icterus.        Right eye: No discharge.         Left eye: No discharge.      Conjunctiva/sclera: Conjunctivae normal.      Pupils: Pupils are equal, round, and reactive to light.   Neck:      Thyroid: No thyromegaly.      Vascular: No JVD.      Trachea: No tracheal deviation.   Cardiovascular:      Rate and Rhythm: Normal rate.      Heart sounds:  Normal heart sounds. No murmur heard.  Pulmonary:      Effort: Pulmonary effort is normal. No respiratory distress.      Breath sounds: Normal breath sounds. No wheezing or rales.   Abdominal:      General: Bowel sounds are normal. There is no distension.      Palpations: Abdomen is soft. There is no mass.      Tenderness: There is no abdominal tenderness. There is no guarding or rebound.   Musculoskeletal:         General: No tenderness.      Cervical back: Normal range of motion and neck supple.      Comments:        Lymphadenopathy:      Cervical: No cervical adenopathy.   Skin:     General: Skin is warm and dry.      Coloration: Skin is not pale.      Findings: No erythema or rash.   Neurological:      Mental Status: He is alert and oriented to person, place, and time.      Cranial Nerves: No cranial nerve deficit.      Coordination: Coordination normal.   Psychiatric:         Behavior: Behavior normal.         Thought Content: Thought content normal.         Judgment: Judgment normal.         Lab Results   Component Value Date    WBC 10.11 02/25/2022    HGB 14.7 02/25/2022    HCT 43.8 02/25/2022     02/25/2022    CHOL 228 (H) 02/25/2022    TRIG 206 (H) 02/25/2022    HDL 45 02/25/2022    ALT 49 (H) 02/25/2022    AST 31 02/25/2022     03/07/2022    K 3.7 03/07/2022     03/07/2022    CREATININE 0.8 03/07/2022    BUN 15 03/07/2022    CO2 24 03/07/2022    TSH 1.063 03/15/2013    PSA 0.16 06/07/2021    HGBA1C 7.4 (H) 03/07/2022     The 10-year ASCVD risk score (Ramon JOHN Jr., et al., 2013) is: 14.1%    Values used to calculate the score:      Age: 61 years      Sex: Male      Is Non- : No      Diabetic: No      Tobacco smoker: No      Systolic Blood Pressure: 140 mmHg      Is BP treated: Yes      HDL Cholesterol: 45 mg/dL      Total Cholesterol: 228 mg/dL    Assessment:       1. Hyperglycemia    2. Need for zoster vaccine    3. Gastroesophageal reflux disease without  esophagitis    4. Asthma with allergic rhinitis, unspecified asthma severity, unspecified whether complicated    5. Depression, unspecified depression type    6. Mild intermittent asthma without complication        Plan:       Hyperglycemia  -     Basic Metabolic Panel; Future; Expected date: 03/03/2022  -     Hemoglobin A1C; Future; Expected date: 03/03/2022    Need for zoster vaccine  -     (In Office Administered) Zoster Recombinant Vaccine    Gastroesophageal reflux disease without esophagitis  -     omeprazole (PRILOSEC) 20 MG capsule; Take 1 capsule (20 mg total) by mouth once daily.  Dispense: 90 capsule; Refill: 4    Asthma with allergic rhinitis, unspecified asthma severity, unspecified whether complicated  -     budesonide (PULMICORT) 0.5 mg/2 mL nebulizer solution; Take 2 mLs (0.5 mg total) by nebulization once daily. Controller  Dispense: 100 mL; Refill: 3    Depression, unspecified depression type  -     FLUoxetine 40 MG capsule; Take 1 capsule (40 mg total) by mouth once daily.  Dispense: 30 capsule; Refill: 11    Mild intermittent asthma without complication  -     montelukast (SINGULAIR) 10 mg tablet; Take 1 tablet (10 mg total) by mouth every evening.  Dispense: 90 tablet; Refill: 3      Review plate method  Review foods in home  Discuss food labels  Refer to exercise program  Assist with medical visit preparation  Review patient results  Review patient education about results  Review chronic disease interventions (see specific disease intervention)  Assess knowledge level  Provide and discuss information/education material  Encourage communication with physician  Monitor compliance  Educate on risk of non-compliance  Give food and resource list  Review patient education material (see patient instructions)  Review patient education material (see patient instructions)  Refer to exercise program  Review stress management techniques  Review patient education material (see patient instructions)  Assess  support system  Discuss stress management techniques  Review normal ranges for labs  This has been fully explained to the patient, who indicates understanding.  There have been some probable dietary and lifestyle compliance issues here. I have discussed with him the great importance of following the treatment plan exactly as directed in order to achieve a good medical outcome.

## 2022-03-25 ENCOUNTER — TELEPHONE (OUTPATIENT)
Dept: FAMILY MEDICINE | Facility: CLINIC | Age: 62
End: 2022-03-25
Payer: COMMERCIAL

## 2022-07-17 ENCOUNTER — HOSPITAL ENCOUNTER (EMERGENCY)
Facility: HOSPITAL | Age: 62
Discharge: HOME | End: 2022-07-18
Attending: EMERGENCY MEDICINE
Payer: COMMERCIAL

## 2022-07-17 DIAGNOSIS — S61.419A LACERATION OF HAND: ICD-10-CM

## 2022-07-17 RX ORDER — LIDOCAINE HYDROCHLORIDE AND EPINEPHRINE 10; 10 MG/ML; UG/ML
10 INJECTION, SOLUTION INFILTRATION; PERINEURAL
Status: COMPLETED | OUTPATIENT
Start: 2022-07-17 | End: 2022-07-18

## 2022-07-17 RX ORDER — LIDOCAINE HYDROCHLORIDE AND EPINEPHRINE 10; 10 MG/ML; UG/ML
INJECTION, SOLUTION INFILTRATION; PERINEURAL
Status: DISCONTINUED
Start: 2022-07-17 | End: 2022-07-18 | Stop reason: HOSPADM

## 2022-07-17 NOTE — Clinical Note
"Jaden Abreu" Jc was seen and treated in our emergency department on 7/17/2022.  He may return to work on 07/23/2022.       If you have any questions or concerns, please don't hesitate to call.      Myron Evans PA-C"

## 2022-07-18 VITALS
BODY MASS INDEX: 31.66 KG/M2 | SYSTOLIC BLOOD PRESSURE: 161 MMHG | RESPIRATION RATE: 16 BRPM | OXYGEN SATURATION: 97 % | WEIGHT: 240 LBS | TEMPERATURE: 98 F | DIASTOLIC BLOOD PRESSURE: 89 MMHG | HEART RATE: 91 BPM

## 2022-07-18 PROCEDURE — 25000003 PHARM REV CODE 250: Performed by: STUDENT IN AN ORGANIZED HEALTH CARE EDUCATION/TRAINING PROGRAM

## 2022-07-18 PROCEDURE — 99283 EMERGENCY DEPT VISIT LOW MDM: CPT

## 2022-07-18 PROCEDURE — 12002 RPR S/N/AX/GEN/TRNK2.6-7.5CM: CPT

## 2022-07-18 RX ADMIN — LIDOCAINE HYDROCHLORIDE AND EPINEPHRINE 10 ML: 10; 10 INJECTION, SOLUTION INFILTRATION; PERINEURAL at 12:07

## 2022-07-18 NOTE — DISCHARGE INSTRUCTIONS
Have sutures removed in 10 days.  Keep wound clean and dry.  Wash with soap and water.  Do not apply topicals.  Keep covered in dirty environments.  Do not submerge wound in standing water, to include swimming, dishes.

## 2022-07-18 NOTE — ED PROVIDER NOTES
Encounter Date: 7/17/2022       History     Chief Complaint   Patient presents with    Laceration     Inside of left hand from drinking glass. Pt tripped and fell with glass in hand. Did not hit head     61-year-old male presents emergency room for evaluation of laceration to the palmar aspect of his left hand.  Patient had a mechanical ground level fall in which he slipped on a wet floor while carrying a drinking glass.  Septal laceration ulnar aspect hand.  He has no distal paresthesias, no loss motor function.  Tetanus up-to-date..  He did not hit his head or lose consciousness.  He has no other complaints today.        Review of patient's allergies indicates:   Allergen Reactions    Lisinopril Other (See Comments)     cough     Past Medical History:   Diagnosis Date    Asthma     Hyperlipidemia     Hypertension      Past Surgical History:   Procedure Laterality Date    COLONOSCOPY N/A 7/21/2016    Procedure: COLONOSCOPY;  Surgeon: Jaden Vlia MD;  Location: [facility];  Service: Endoscopy;  Laterality: N/A;     Family History   Problem Relation Age of Onset    Hypertension Mother     Heart disease Father     Diabetes Brother         adult onset    Birth defects Maternal Aunt         colon    Lupus Neg Hx     Eczema Neg Hx     Melanoma Neg Hx     Psoriasis Neg Hx      Social History     Tobacco Use    Smoking status: Never Smoker    Smokeless tobacco: Never Used   Substance Use Topics    Alcohol use: No    Drug use: No     Review of Systems   Constitutional: Negative for chills, fatigue and fever.   HENT: Negative for congestion, hearing loss, sore throat and trouble swallowing.    Eyes: Negative for visual disturbance.   Respiratory: Negative for cough, chest tightness and shortness of breath.    Cardiovascular: Negative for chest pain.   Gastrointestinal: Negative for abdominal pain and nausea.   Endocrine: Negative for polyuria.   Genitourinary: Negative for difficulty urinating.    Musculoskeletal: Negative for arthralgias and myalgias.   Skin: Positive for wound. Negative for rash.   Neurological: Negative for dizziness and headaches.   Psychiatric/Behavioral: The patient is not nervous/anxious.    All other systems reviewed and are negative.      Physical Exam     Initial Vitals [07/17/22 2339]   BP Pulse Resp Temp SpO2   (!) 170/111 93 16 98.1 °F (36.7 °C) 96 %      MAP       --         Physical Exam    Nursing note and vitals reviewed.  Constitutional: He appears well-developed and well-nourished.   HENT:   Head: Normocephalic and atraumatic.   Eyes: Conjunctivae and EOM are normal.   Neck: Neck supple.   Cardiovascular: Intact distal pulses.   Pulmonary/Chest: No respiratory distress.   Musculoskeletal:      Cervical back: Neck supple.     Neurological: He is alert and oriented to person, place, and time. GCS score is 15. GCS eye subscore is 4. GCS verbal subscore is 5. GCS motor subscore is 6.   Skin: Skin is warm and dry. Capillary refill takes less than 2 seconds.   5 cm laceration noted to the palmar aspect of the left hand.  No distal paresthesias, neurovascularly intact.  No notable tendinous involvement.  No foreign bodies.   Psychiatric: He has a normal mood and affect. His behavior is normal. Judgment and thought content normal.         ED Course   Lac Repair    Date/Time: 7/18/2022 12:27 AM  Performed by: Myron Evans PA-C  Authorized by: Apoorva Martines MD     Skin repair:     Repair method:  Sutures    Suture size:  4-0    Suture material:  Nylon    Suture technique:  Simple interrupted    Number of sutures:  8  Approximation:     Approximation:  Close  Repair type:     Repair type:  Simple  Post-procedure details:     Dressing:  Open (no dressing)    Procedure completion:  Tolerated well, no immediate complications      Labs Reviewed - No data to display       Imaging Results          X-Ray Hand 2 View Left (Final result)  Result time 07/18/22 06:56:27    Final result  by Rafat White MD (07/18/22 06:56:27)                 Narrative:    Left hand 2 views    CLINICAL DATA: Trauma, laceration    FINDINGS: 2 views demonstrate no radiographic evidence of acute fracture or dislocation. No osseous destructive lesion is identified. There is multifocal interphalangeal joint space narrowing consistent with osteoarthritic change, most pronounced at the thumb. No radiopaque soft tissue foreign bodies are identified.    Calcific densities overlying the hamate, of doubtful current significance.    IMPRESSION:  1. No acute findings.  2. Multifocal interphalangeal osteoarthritic change.    Electronically signed by:  Rafat White MD  7/18/2022 6:56 AM CDT Workstation: [id number]                  ED Interpretation by Myron Evans PA-C (07/18/22 00:28:17, [facility] - Emergency Dept, Emergency Medicine)    This represents my own interpretation of the plain film. Study was reviewed with the cosigning physician.  The film quality is acceptable. Patient was able to provide good positioning, no significant artifact from overlying objects. Orthoganol views were obtained and interpreted together. Previous images were reviewed as available and indicated for comparison.  -The bones are anatomically aligned, no suggestion of dislocation.   -Joint spaces are maintained and symmetric.   -The cortices are continuous and there is no disruption of bone density, no suggestion of fracture.   - There are no soft tissue effusions or edema. No foreign bodies noted in soft tissue.                                 Medications   LIDOcaine-EPINEPHrine 1%-1:100,000 injection 10 mL (10 mLs Intradermal Given 7/18/22 0025)     Medical Decision Making:   Initial Assessment:   Nontoxic, well-appearing and in no acute distress.  ED Management:  61-year-old male presenting to the emergency room for evaluation of laceration to his left palm after mechanical ground level fall, he was cut with a broken  glass.  Plain films on my review do not demonstrate acute fracture, dislocation or foreign body.  The wound was thoroughly irrigated and then repaired using simple interrupted sutures.  Patient was discharged home stable condition.  Tetanus up-to-date.    Disposition:  Improved, discharged  Plan to discharge home with appropriate follow-up, including primary care manager.    I discussed the findings and plan of care with this patient.  All questions were answered to the patient's satisfaction.  Disposition plan as above.  Verbal and written discharge instructions provided to the patient on discharge.  Return precautions discussed prior to discharge.     I discuss this patient case with the cosigning physician, who agrees with diagnosis and plan of care. This note was written using the assistance of a dictation program and may contain grammatical errors.                       Clinical Impression:   Final diagnoses:  [S61.419A] Laceration of hand          ED Disposition Condition    Discharge Stable        ED Prescriptions     None        Follow-up Information     Follow up With Specialties Details Why Contact Info Additional Information    Nic Plascencia MD Family Medicine Schedule an appointment as soon as possible for a visit in 1 week  [address] [facility] [address]  [phone]       [facility] - Emergency Dept Emergency Medicine Go to  As needed, If symptoms worsen [address] [facility] [address]  [phone] 1st floor           Myron Evans PA-C  07/18/22 1958

## 2022-07-19 ENCOUNTER — TELEPHONE (OUTPATIENT)
Dept: FAMILY MEDICINE | Facility: CLINIC | Age: 62
End: 2022-07-19
Payer: COMMERCIAL

## 2022-07-19 NOTE — TELEPHONE ENCOUNTER
----- Message from Richard Rosado sent at 7/19/2022 11:57 AM CDT -----  Contact: CARMEN SANCHEZ [[id number]]  Caller is requesting a sooner appointment.           Caller declined first available appointment listed below.           Caller will not accept being placed on the waitlist and is requesting a message be sent to doctor.         Name of Caller: CARMEN SANCHEZ [[id number]]         When is the first available appointment? 10/5         Symptoms:follow up, patient cut left hand          Best Call Back Number: [phone] (mobile)         Additional Information:

## 2022-07-25 ENCOUNTER — OFFICE VISIT (OUTPATIENT)
Dept: FAMILY MEDICINE | Facility: CLINIC | Age: 62
End: 2022-07-25
Payer: COMMERCIAL

## 2022-07-25 VITALS
WEIGHT: 232.81 LBS | TEMPERATURE: 98 F | RESPIRATION RATE: 16 BRPM | DIASTOLIC BLOOD PRESSURE: 78 MMHG | HEART RATE: 80 BPM | SYSTOLIC BLOOD PRESSURE: 131 MMHG | BODY MASS INDEX: 30.71 KG/M2

## 2022-07-25 DIAGNOSIS — Z48.02 VISIT FOR SUTURE REMOVAL: Primary | ICD-10-CM

## 2022-07-25 PROCEDURE — 99213 PR OFFICE/OUTPT VISIT, EST, LEVL III, 20-29 MIN: ICD-10-PCS | Mod: S$GLB,,, | Performed by: FAMILY MEDICINE

## 2022-07-25 PROCEDURE — 99999 PR PBB SHADOW E&M-EST. PATIENT-LVL III: ICD-10-PCS | Mod: PBBFAC,,, | Performed by: FAMILY MEDICINE

## 2022-07-25 PROCEDURE — 99999 PR PBB SHADOW E&M-EST. PATIENT-LVL III: CPT | Mod: PBBFAC,,, | Performed by: FAMILY MEDICINE

## 2022-07-25 PROCEDURE — 3008F BODY MASS INDEX DOCD: CPT | Mod: CPTII,S$GLB,, | Performed by: FAMILY MEDICINE

## 2022-07-25 PROCEDURE — 3078F DIAST BP <80 MM HG: CPT | Mod: CPTII,S$GLB,, | Performed by: FAMILY MEDICINE

## 2022-07-25 PROCEDURE — 3075F SYST BP GE 130 - 139MM HG: CPT | Mod: CPTII,S$GLB,, | Performed by: FAMILY MEDICINE

## 2022-07-25 PROCEDURE — 3008F PR BODY MASS INDEX (BMI) DOCUMENTED: ICD-10-PCS | Mod: CPTII,S$GLB,, | Performed by: FAMILY MEDICINE

## 2022-07-25 PROCEDURE — 3075F PR MOST RECENT SYSTOLIC BLOOD PRESS GE 130-139MM HG: ICD-10-PCS | Mod: CPTII,S$GLB,, | Performed by: FAMILY MEDICINE

## 2022-07-25 PROCEDURE — 3051F HG A1C>EQUAL 7.0%<8.0%: CPT | Mod: CPTII,S$GLB,, | Performed by: FAMILY MEDICINE

## 2022-07-25 PROCEDURE — 3078F PR MOST RECENT DIASTOLIC BLOOD PRESSURE < 80 MM HG: ICD-10-PCS | Mod: CPTII,S$GLB,, | Performed by: FAMILY MEDICINE

## 2022-07-25 PROCEDURE — 99213 OFFICE O/P EST LOW 20 MIN: CPT | Mod: S$GLB,,, | Performed by: FAMILY MEDICINE

## 2022-07-25 PROCEDURE — 3051F PR MOST RECENT HEMOGLOBIN A1C LEVEL 7.0 - < 8.0%: ICD-10-PCS | Mod: CPTII,S$GLB,, | Performed by: FAMILY MEDICINE

## 2022-07-25 RX ORDER — FLUOXETINE HYDROCHLORIDE 20 MG/1
CAPSULE ORAL
COMMUNITY
Start: 2022-02-06 | End: 2023-08-02

## 2022-07-25 NOTE — PROGRESS NOTES
Subjective:   Patient ID: Jaden Greene is a 61 y.o. male     Chief Complaint:Laceration      Here for hospital follow up    Review of Systems   Respiratory: Negative for shortness of breath.    Cardiovascular: Negative for chest pain.   Gastrointestinal: Negative for abdominal pain.   Genitourinary: Negative for dysuria.     Past Medical History:   Diagnosis Date    Asthma     Hyperlipidemia     Hypertension      Past Surgical History:   Procedure Laterality Date    COLONOSCOPY N/A 7/21/2016    Procedure: COLONOSCOPY;  Surgeon: Jaden Vila MD;  Location: [facility];  Service: Endoscopy;  Laterality: N/A;     Objective:     Vitals:    07/25/22 0832   BP: 131/78   Pulse: 80   Resp: 16   Temp: 97.8 °F (36.6 °C)     Body mass index is 30.71 kg/m².  Physical Exam  Vitals and nursing note reviewed.   Constitutional:       Appearance: He is well-developed.   HENT:      Head: Normocephalic and atraumatic.   Eyes:      General: No scleral icterus.     Conjunctiva/sclera: Conjunctivae normal.   Pulmonary:      Effort: Pulmonary effort is normal. No respiratory distress.   Musculoskeletal:         General: No deformity. Normal range of motion.      Cervical back: Normal range of motion and neck supple.   Skin:     Coloration: Skin is not pale.      Findings: No rash.   Neurological:      Mental Status: He is alert and oriented to person, place, and time.   Psychiatric:         Behavior: Behavior normal.         Thought Content: Thought content normal.         Judgment: Judgment normal.       Assessment:     1. Visit for suture removal      Plan:   Visit for suture removal  8 days s/p laceration and sutures in ED. Pt states last had tetanus in 2019. otheriwise denies significant decreased ROM. Laceration over palm of left hand from 1st, 2nd, to 3rd digits. Healing appropriately. No signs of infection. Removed 5 sutures.          Total time spent of Less than 30 minutes minutes on the day of the visit.This  includes face to face time and preparing to see the patient, obtaining and reviewing separately obtained history, documenting clinical information in the electronic or other health record, independently interpreting results and communicating results to the patient/family/caregiver, or care coordinator.    Saad Lopez MD  07/25/2022    Portions of this note have been dictated with GERALD Newsome

## 2023-03-15 DIAGNOSIS — I10 ESSENTIAL HYPERTENSION: ICD-10-CM

## 2023-05-19 DIAGNOSIS — I10 ESSENTIAL HYPERTENSION: ICD-10-CM

## 2023-05-19 NOTE — TELEPHONE ENCOUNTER
----- Message from Edwin Ruff sent at 5/19/2023  4:35 PM CDT -----  Contact: pt [phone]  Type:  RX Refill Request    Who Called:  pt  Refill or New Rx:  refill  RX Name and Strength:  amLODIPine,chlorthalidone   How is the patient currently taking it? (ex. 1XDay):  1xday  Is this a 30 day or 90 day RX:  30,90  Preferred Pharmacy with phone number:    [id number] - [place] [place] - [address] [address] [address] [facility] [address]  Phone: [phone] Fax: [phone]  Local or Mail Order:  local  Ordering Provider:  Dr.Baez Castaneda Call Back Number:  [phone]    Additional Information:  Pt is calling the office needing a refill on prescriptions amLODIPine,chlorthalidone. Please call back and advise.

## 2023-05-19 NOTE — TELEPHONE ENCOUNTER
Care Due:                  Date            Visit Type   Department     Provider  --------------------------------------------------------------------------------                                EP -                              PRIMARY      [facility]  Last Visit: 07-      CARE ([facility])   MEDICINE       Saad Lopez  Next Visit: None Scheduled  None         None Found                                                            Last  Test          Frequency    Reason                     Performed    Due Date  --------------------------------------------------------------------------------    Office Visit  12 months..  albuterol, atorvastatin,   07- 07-                             chlorthalidone,                             fluticasone,                             levalbuterol,                             montelukast, omeprazole..    [facility] Embedded Care Due Messages. Reference number: [id number].   5/19/2023 4:55:22 PM CDT

## 2023-05-22 DIAGNOSIS — I10 ESSENTIAL HYPERTENSION: ICD-10-CM

## 2023-05-22 NOTE — TELEPHONE ENCOUNTER
Care Due:                  Date            Visit Type   Department     Provider  --------------------------------------------------------------------------------                                EP -                              PRIMARY      [facility]  Last Visit: 07-      CARE ([facility])   MEDICINE       Saad Lopez  Next Visit: None Scheduled  None         None Found                                                            Last  Test          Frequency    Reason                     Performed    Due Date  --------------------------------------------------------------------------------    CMP.........  12 months..  atorvastatin,              02- 02-                             chlorthalidone...........    Lipid Panel.  12 months..  atorvastatin.............  02- 02-    [employer] Embedded Care Due Messages. Reference number: [id number].   5/22/2023 2:25:40 PM CDT

## 2023-05-22 NOTE — TELEPHONE ENCOUNTER
----- Message from Ofelia Dyer sent at 5/22/2023 11:27 AM CDT -----  Contact: pt  Pt is calling he lost his blood pressure medication   Please give pt a call back [phone]

## 2023-05-24 RX ORDER — AMLODIPINE BESYLATE 10 MG/1
10 TABLET ORAL DAILY
Qty: 30 TABLET | Refills: 0 | Status: SHIPPED | OUTPATIENT
Start: 2023-05-24 | End: 2023-05-25

## 2023-05-24 RX ORDER — CHLORTHALIDONE 25 MG/1
25 TABLET ORAL DAILY
Qty: 90 TABLET | Refills: 0 | OUTPATIENT
Start: 2023-05-24

## 2023-05-24 RX ORDER — CHLORTHALIDONE 25 MG/1
25 TABLET ORAL DAILY
Qty: 90 TABLET | Refills: 0 | Status: SHIPPED | OUTPATIENT
Start: 2023-05-24 | End: 2023-10-23

## 2023-05-24 RX ORDER — AMLODIPINE BESYLATE 10 MG/1
10 TABLET ORAL DAILY
Qty: 30 TABLET | Refills: 11 | OUTPATIENT
Start: 2023-05-24 | End: 2024-05-23

## 2023-05-25 ENCOUNTER — OFFICE VISIT (OUTPATIENT)
Dept: FAMILY MEDICINE | Facility: CLINIC | Age: 63
End: 2023-05-25
Payer: COMMERCIAL

## 2023-05-25 ENCOUNTER — LAB VISIT (OUTPATIENT)
Dept: LAB | Facility: HOSPITAL | Age: 63
End: 2023-05-25
Attending: NURSE PRACTITIONER
Payer: COMMERCIAL

## 2023-05-25 VITALS
OXYGEN SATURATION: 97 % | WEIGHT: 235.25 LBS | DIASTOLIC BLOOD PRESSURE: 90 MMHG | HEART RATE: 71 BPM | TEMPERATURE: 98 F | SYSTOLIC BLOOD PRESSURE: 158 MMHG | HEIGHT: 73 IN | BODY MASS INDEX: 31.18 KG/M2

## 2023-05-25 DIAGNOSIS — J45.30 MILD PERSISTENT ASTHMA WITHOUT COMPLICATION: ICD-10-CM

## 2023-05-25 DIAGNOSIS — I10 ESSENTIAL HYPERTENSION: ICD-10-CM

## 2023-05-25 DIAGNOSIS — E78.2 MIXED HYPERLIPIDEMIA: ICD-10-CM

## 2023-05-25 DIAGNOSIS — I10 ESSENTIAL HYPERTENSION: Primary | ICD-10-CM

## 2023-05-25 DIAGNOSIS — R73.9 HYPERGLYCEMIA: ICD-10-CM

## 2023-05-25 DIAGNOSIS — J45.909 ASTHMA WITH ALLERGIC RHINITIS, UNSPECIFIED ASTHMA SEVERITY, UNSPECIFIED WHETHER COMPLICATED: ICD-10-CM

## 2023-05-25 LAB
ALBUMIN SERPL BCP-MCNC: 3.9 G/DL (ref 3.5–5.2)
ALP SERPL-CCNC: 102 U/L (ref 55–135)
ALT SERPL W/O P-5'-P-CCNC: 29 U/L (ref 10–44)
ANION GAP SERPL CALC-SCNC: 13 MMOL/L (ref 8–16)
AST SERPL-CCNC: 22 U/L (ref 10–40)
BILIRUB SERPL-MCNC: 0.6 MG/DL (ref 0.1–1)
BUN SERPL-MCNC: 15 MG/DL (ref 8–23)
CALCIUM SERPL-MCNC: 9.5 MG/DL (ref 8.7–10.5)
CHLORIDE SERPL-SCNC: 103 MMOL/L (ref 95–110)
CHOLEST SERPL-MCNC: 207 MG/DL (ref 120–199)
CHOLEST/HDLC SERPL: 4.7 {RATIO} (ref 2–5)
CO2 SERPL-SCNC: 24 MMOL/L (ref 23–29)
CREAT SERPL-MCNC: 0.9 MG/DL (ref 0.5–1.4)
EST. GFR  (NO RACE VARIABLE): >60 ML/MIN/1.73 M^2
ESTIMATED AVG GLUCOSE: 148 MG/DL (ref 68–131)
GLUCOSE SERPL-MCNC: 148 MG/DL (ref 70–110)
HBA1C MFR BLD: 6.8 % (ref 4–5.6)
HDLC SERPL-MCNC: 44 MG/DL (ref 40–75)
HDLC SERPL: 21.3 % (ref 20–50)
LDLC SERPL CALC-MCNC: 115.6 MG/DL (ref 63–159)
NONHDLC SERPL-MCNC: 163 MG/DL
POTASSIUM SERPL-SCNC: 3.9 MMOL/L (ref 3.5–5.1)
PROT SERPL-MCNC: 7.3 G/DL (ref 6–8.4)
SODIUM SERPL-SCNC: 140 MMOL/L (ref 136–145)
TRIGL SERPL-MCNC: 237 MG/DL (ref 30–150)

## 2023-05-25 PROCEDURE — 80061 LIPID PANEL: CPT | Performed by: NURSE PRACTITIONER

## 2023-05-25 PROCEDURE — 3008F BODY MASS INDEX DOCD: CPT | Mod: CPTII,S$GLB,, | Performed by: NURSE PRACTITIONER

## 2023-05-25 PROCEDURE — 99999 PR PBB SHADOW E&M-EST. PATIENT-LVL IV: ICD-10-PCS | Mod: PBBFAC,,, | Performed by: NURSE PRACTITIONER

## 2023-05-25 PROCEDURE — 83036 HEMOGLOBIN GLYCOSYLATED A1C: CPT | Performed by: NURSE PRACTITIONER

## 2023-05-25 PROCEDURE — 80053 COMPREHEN METABOLIC PANEL: CPT | Performed by: NURSE PRACTITIONER

## 2023-05-25 PROCEDURE — 3080F DIAST BP >= 90 MM HG: CPT | Mod: CPTII,S$GLB,, | Performed by: NURSE PRACTITIONER

## 2023-05-25 PROCEDURE — 99999 PR PBB SHADOW E&M-EST. PATIENT-LVL IV: CPT | Mod: PBBFAC,,, | Performed by: NURSE PRACTITIONER

## 2023-05-25 PROCEDURE — 99214 PR OFFICE/OUTPT VISIT, EST, LEVL IV, 30-39 MIN: ICD-10-PCS | Mod: S$GLB,,, | Performed by: NURSE PRACTITIONER

## 2023-05-25 PROCEDURE — 3008F PR BODY MASS INDEX (BMI) DOCUMENTED: ICD-10-PCS | Mod: CPTII,S$GLB,, | Performed by: NURSE PRACTITIONER

## 2023-05-25 PROCEDURE — 99214 OFFICE O/P EST MOD 30 MIN: CPT | Mod: S$GLB,,, | Performed by: NURSE PRACTITIONER

## 2023-05-25 PROCEDURE — 36415 COLL VENOUS BLD VENIPUNCTURE: CPT | Mod: PO | Performed by: NURSE PRACTITIONER

## 2023-05-25 PROCEDURE — 1159F MED LIST DOCD IN RCRD: CPT | Mod: CPTII,S$GLB,, | Performed by: NURSE PRACTITIONER

## 2023-05-25 PROCEDURE — 1159F PR MEDICATION LIST DOCUMENTED IN MEDICAL RECORD: ICD-10-PCS | Mod: CPTII,S$GLB,, | Performed by: NURSE PRACTITIONER

## 2023-05-25 PROCEDURE — 3077F SYST BP >= 140 MM HG: CPT | Mod: CPTII,S$GLB,, | Performed by: NURSE PRACTITIONER

## 2023-05-25 PROCEDURE — 3080F PR MOST RECENT DIASTOLIC BLOOD PRESSURE >= 90 MM HG: ICD-10-PCS | Mod: CPTII,S$GLB,, | Performed by: NURSE PRACTITIONER

## 2023-05-25 PROCEDURE — 3077F PR MOST RECENT SYSTOLIC BLOOD PRESSURE >= 140 MM HG: ICD-10-PCS | Mod: CPTII,S$GLB,, | Performed by: NURSE PRACTITIONER

## 2023-05-25 RX ORDER — AMLODIPINE BESYLATE 10 MG/1
10 TABLET ORAL DAILY
Qty: 90 TABLET | Refills: 3 | Status: SHIPPED | OUTPATIENT
Start: 2023-05-25

## 2023-05-25 RX ORDER — BUDESONIDE 0.5 MG/2ML
0.5 INHALANT ORAL DAILY
Qty: 100 ML | Refills: 3 | Status: SHIPPED | OUTPATIENT
Start: 2023-05-25 | End: 2024-05-24

## 2023-05-25 RX ORDER — LEVALBUTEROL INHALATION SOLUTION 1.25 MG/3ML
1 SOLUTION RESPIRATORY (INHALATION) EVERY 4 HOURS PRN
Qty: 1 EACH | Refills: 3 | Status: SHIPPED | OUTPATIENT
Start: 2023-05-25

## 2023-05-25 NOTE — PROGRESS NOTES
This dictation has been generated using Modal Fluency Dictation some phonetic errors may occur. Please contact author for clarification if needed.     Problem List Items Addressed This Visit       Hyperlipidemia    Essential hypertension - Primary    Relevant Medications    amLODIPine (NORVASC) 10 MG tablet    Other Relevant Orders    Comprehensive Metabolic Panel    Hemoglobin A1C    Lipid Panel    Mild persistent asthma without complication     Other Visit Diagnoses       Asthma with allergic rhinitis, unspecified asthma severity, unspecified whether complicated        Relevant Medications    budesonide (PULMICORT) 0.5 mg/2 mL nebulizer solution    levalbuterol (XOPENEX) 1.25 mg/3 mL nebulizer solution    Hyperglycemia        Relevant Orders    Hemoglobin A1C            Orders Placed This Encounter    Comprehensive Metabolic Panel    Hemoglobin A1C    Lipid Panel    amLODIPine (NORVASC) 10 MG tablet    budesonide (PULMICORT) 0.5 mg/2 mL nebulizer solution    levalbuterol (XOPENEX) 1.25 mg/3 mL nebulizer solution     Hyperlipidemia stable due for reassessment.    Hypertension blood pressure elevated out of med.  Also notes recent stressors.    Asthma without acute exacerbation.  Notes resumption of Pulmicort and Xopenex.    Hyperglycemia update A1c.    Follow up in about 6 months (around 11/25/2023).    ________________________________________________________________  ________________________________________________________________      Chief Complaint   Patient presents with    Annual Exam     History of present illness  This 62 y.o. presents today for complaint of six-month follow-up.  Patient has hypertension hyperlipidemia asthma hyperglycemia.  Follows with [facility].  Notes he is out of medicine currently.  His blood pressure is elevated.  He notes increased stressors of late and he just bought a car recently.  Notes other family issues involving canceled wedding.  Patient denies chest pain or shortness  of breath.  He has had some wheezing.  He did resume his Pulmicort and Xopenex.  Review of record notes some hyperglycemia.  Patient denies polyuria polydipsia.    Past Medical History:   Diagnosis Date    Asthma     Hyperlipidemia     Hypertension        Past Surgical History:   Procedure Laterality Date    COLONOSCOPY N/A 7/21/2016    Procedure: COLONOSCOPY;  Surgeon: Jaden Vila MD;  Location: [facility];  Service: Endoscopy;  Laterality: N/A;       Family History   Problem Relation Age of Onset    Hypertension Mother     Heart disease Father     Diabetes Brother         adult onset    Birth defects Maternal Aunt         colon    Lupus Neg Hx     Eczema Neg Hx     Melanoma Neg Hx     Psoriasis Neg Hx        Social History     Socioeconomic History    Marital status:    Occupational History    Occupation: [employer]     Employer: [employer]   Tobacco Use    Smoking status: Never    Smokeless tobacco: Never   Substance and Sexual Activity    Alcohol use: No    Drug use: No       Current Outpatient Medications   Medication Sig Dispense Refill    atorvastatin (LIPITOR) 20 MG tablet TAKE ONE TABLET BY MOUTH ONCE DAILY 90 tablet 3    FLUoxetine 20 MG capsule       fluticasone furoate-vilanteroL (BREO ELLIPTA) 100-25 mcg/dose diskus inhaler Inhale 1 puff into the lungs once daily. Controller 1 each 11    montelukast (SINGULAIR) 10 mg tablet TAKE ONE TABLET BY MOUTH DAILY IN THE EVENING 90 tablet 3    omeprazole (PRILOSEC) 20 MG capsule Take 1 capsule (20 mg total) by mouth once daily. 90 capsule 4    amLODIPine (NORVASC) 10 MG tablet Take 1 tablet (10 mg total) by mouth once daily. 90 tablet 3    budesonide (PULMICORT) 0.5 mg/2 mL nebulizer solution Take 2 mLs (0.5 mg total) by nebulization once daily. Controller 100 mL 3    chlorthalidone (HYGROTEN) 25 MG Tab Take 1 tablet (25 mg total) by mouth once daily. 90 tablet 0    levalbuterol (XOPENEX) 1.25 mg/3 mL nebulizer solution Take 3 mLs (1.25 mg total)  by nebulization every 4 (four) hours as needed for Wheezing. 1 each 3     No current facility-administered medications for this visit.       Review of patient's allergies indicates:   Allergen Reactions    Lisinopril Other (See Comments)     cough       Physical examination  Vitals Reviewed\  Vitals:    05/25/23 0758   BP: (!) 158/90   Pulse:    Temp:      Body mass index is 31.03 kg/m².    Weight: 106.7 kg (235 lb 3.7 oz)    Gen. Well-dressed well-nourished   Skin warm dry and intact.  No rashes noted.  Neck is supple without adenopathy  Chest.  Respirations are even unlabored.  Lungs are clear to auscultation.  Cardiac regular rate and rhythm.  No chest wall adenopathy noted.  Neuro. Awake alert oriented x4.  Normal judgment and cognition noted.  Extremities no clubbing cyanosis or edema noted.     Call or return to clinic prn if these symptoms worsen or fail to improve as anticipated.

## 2023-05-25 NOTE — PATIENT INSTRUCTIONS
"Leo Stanley,     If you are due for any health screening(s) below please notify me so we can arrange them to be ordered and scheduled to maintain your health. Most healthy patients complete it. Don't lose out on improving your health.     Tests to Keep You Healthy    Colon Cancer Screening: DUE  Last Blood Pressure <= 139/89 (5/25/2023): NO         Colon Cancer Screening    Of cancers affecting both men and women, colorectal cancer is the third leading cancer killer in the [place]. But it doesnt have to be. Screening can prevent colorectal cancer or find it at an early stage when treatment often leads to a cure.    A colonoscopy is the preferred test for detecting colon cancer. It is needed only once every 10 years if results are negative. While sedated, a flexible, lighted tube with a tiny camera is inserted into the rectum and advanced through the colon to look for cancers. An alternative screening test that is used at home and returned to the lab may also be used. It detects hidden blood in bowel movements which could indicate cancer in the colon. If results are positive, you will need a colonoscopy to determine if the blood is a sign of cancer. This type of follow up (diagnostic) colonoscopy usually requires additional copays as required by your insurance provider. Please contact your PCP if you have any questions.                  Patient Education       Checking Your Blood Pressure at Home   The Basics   Written by the doctors and editors at [facility]   How is blood pressure measured? -- Blood pressure is usually measured with a device that goes around your upper arm. This is often done in a doctor's office. But some people also check their blood pressure themselves, at home or at work.  Blood pressure is explained with 2 numbers. For instance, your blood pressure might be "140 over 90." The first (top) number is the pressure inside your arteries when your heart is lamin. The second (bottom) number is " "the pressure inside your arteries when your heart is relaxed. The table shows how doctors and nurses define high and normal blood pressure (table 1).  If your blood pressure gets too high, it puts you at risk for heart attack, stroke, and kidney disease. High blood pressure does not usually cause symptoms. But it can be serious.  What is a home blood pressure meter? -- A home blood pressure meter (or "monitor") is a device you can use to check your blood pressure yourself. It has a cuff that goes around your upper arm (figure 1). Some devices have a cuff that goes around your wrist instead. But doctors aren't sure if these work as well. The meter also has a small screen, or dial, that shows your blood pressure numbers.  There are also special meters you can wear for a day or 2. These are different because they automatically check your blood pressure throughout the day and night, even while you are sleeping. If your doctor thinks you should use one of these devices, they will talk to you about how to wear it.  Why do I need to check my blood pressure at home? -- If your doctor knows or suspects that you have high blood pressure, they might want you to check it at home. There are a few reasons for this. Your doctor might want to look at:  Whether your blood pressure measures the same at home as it did in the doctor's office  How well your blood pressure medicines are working  Changes in your blood pressure, for example, if it goes up and down  People who check their own blood pressure at home usually do better at keeping it low.  How do I choose a home blood pressure meter? -- When choosing a home blood pressure meter, you will probably want to think about:  Cost - Some devices cost more than others. You should also check to see if your insurance will help pay for your device.  Size - It's important to make sure the cuff fits your arm comfortably. Your doctor or nurse can help you with this.  How easy it is to use - " You should make sure you understand how to use the device. You also need to be able to read the numbers on the screen.  You do not need a prescription to buy a home blood pressure meter. You can buy them at most pharmacies or over the internet. Your doctor or nurse can help you choose the right device for you.  How do I check my blood pressure at home? -- Once you have a home blood pressure meter, your doctor or nurse should check it to make sure it fits you and works correctly.  When it's time to check your blood pressure:  Go to the bathroom and empty your bladder first. Having a full bladder can temporarily increase your blood pressure, making the results inaccurate.  Sit in a chair with your feet flat on the ground.  Try to breathe normally and stay calm.  Attach the cuff to your arm. Place the cuff directly on your skin, not over your clothing. The cuff should be tight enough to not slip down, but not uncomfortably tight.  Sit and relax for about 3 to 5 minutes with the cuff on.  Follow the directions that came with your device to start measuring your blood pressure. This might involve squeezing the bulb at the end of the tube to inflate the cuff (fill it with air). With some monitors, you just need to press a button to inflate the cuff. When the cuff fills with air, it feels like someone is squeezing your arm, but it should not hurt. Then you will slowly deflate the cuff (let the air out of it), or it will deflate by itself. The screen or dial will show your blood pressure numbers.  Stay seated and relax for 1 minute, then measure your blood pressure again.  How often should I check my blood pressure? -- It depends. Different people need to follow different schedules. Your doctor or nurse will tell you how often to check your blood pressure, and when. Some people need to check their blood pressure twice a day, in the morning and evening.  Your doctor or nurse will probably tell you to keep track of your blood  pressure for at least a few days (table 2). Then they will look at the numbers. The reason for this is that it's normal for your blood pressure to change a bit from day to day. For example, the numbers might change depending on whether you recently had caffeine, just exercised, or feel stressed. Checking your blood pressure over several days - or longer - will give your doctor or nurse a better idea of what is average for you.  How should I keep track of my blood pressure? -- Some blood pressure meters will record your numbers for you, or send them to your computer or smartphone. If yours does not do this, you will need to write them down. Your doctor or nurse can help you figure out the best way to keep track of the numbers.  What if my blood pressure is high? -- Your doctor or nurse will tell you what to do if your blood pressure is high when you check it at home. If you get a number that is higher than normal, measure it again to see if it is still high. If it is very high (above a certain number, which your doctor or nurse will tell you to watch out for), you should call your doctor right away.  If your blood pressure is only a little high, your doctor or nurse might tell you to keep checking it for a few more days or weeks, and then call if it does not go back down. Then they can help you decide what to do next.  All topics are updated as new evidence becomes available and our peer review process is complete.  This topic retrieved from [employer] on: Sep 21, 2021.  Topic 188647 Version 4.0  Release: 29.4.2 - C29.263  © 2021 UpToDate, Inc. and/or its affiliates. All rights reserved.  table 1: Definition of normal and high blood pressure  Level  Top number  Bottom number    High 130 or above 80 or above   Elevated 120 to 129 79 or below   Normal 119 or below 79 or below   These definitions are from the American College of Cardiology/American Heart Association. Other expert groups might use slightly different  "definitions.  "Elevated blood pressure" is a term doctor or nurses use as a warning. It means you do not yet have high blood pressure, but your blood pressure is not as low as it should be for good health.  Graphic 81773 Version 6.0  figure 1: Using a home blood pressure meter     This is an example of a person using a home blood pressure meter.  Graphic 953709 Version 1.0    table 2: 7-day diary for checking blood pressure at home  Day 1  Day 2  Day 3  Day 4  Day 5  Day 6  Day 7    Morning  1st read Morning  1st read Morning  1st read Morning  1st read Morning  1st read Morning  1st read Morning  1st read   Systolic: __________ Systolic: __________ Systolic: __________ Systolic: __________ Systolic: __________ Systolic: __________ Systolic: __________   Diastolic: __________ Diastolic: __________ Diastolic: __________ Diastolic: __________ Diastolic: __________ Diastolic: __________ Diastolic: __________   Pulse: __________ Pulse: __________ Pulse: __________ Pulse: __________ Pulse: __________ Pulse: __________ Pulse: __________   Morning  2nd read Morning  2nd read Morning  2nd read Morning  2nd read Morning  2nd read Morning  2nd read Morning  2nd read   Systolic: __________ Systolic: __________ Systolic: __________ Systolic: __________ Systolic: __________ Systolic: __________ Systolic: __________   Diastolic: __________ Diastolic: __________ Diastolic: __________ Diastolic: __________ Diastolic: __________ Diastolic: __________ Diastolic: __________   Pulse: __________ Pulse: __________ Pulse: __________ Pulse: __________ Pulse: __________ Pulse: __________ Pulse: __________   Evening  1st read Evening  1st read Evening  1st read Evening  1st read Evening  1st read Evening  1st read Evening  1st read   Systolic: __________ Systolic: __________ Systolic: __________ Systolic: __________ Systolic: __________ Systolic: __________ Systolic: __________   Diastolic: __________ Diastolic: __________ Diastolic: " __________ Diastolic: __________ Diastolic: __________ Diastolic: __________ Diastolic: __________   Pulse: __________ Pulse: __________ Pulse: __________ Pulse: __________ Pulse: __________ Pulse: __________ Pulse: __________   Evening  2nd read Evening  2nd read Evening  2nd read Evening  2nd read Evening  2nd read Evening  2nd read Evening  2nd read   Systolic: __________ Systolic: __________ Systolic: __________ Systolic: __________ Systolic: __________ Systolic: __________ Systolic: __________   Diastolic: __________ Diastolic: __________ Diastolic: __________ Diastolic: __________ Diastolic: __________ Diastolic: __________ Diastolic: __________   Pulse: __________ Pulse: __________ Pulse: __________ Pulse: __________ Pulse: __________ Pulse: __________ Pulse: __________   Notes    Notes    Notes    Notes    Notes    Notes    Notes      ____________________ ____________________ ____________________ ____________________ ____________________ ____________________ ____________________   ____________________ ____________________ ____________________ ____________________ ____________________ ____________________ ____________________   ____________________ ____________________ ____________________ ____________________ ____________________ ____________________ ____________________   Patient name: ______________________________     Patient ID: ________________________________    Primary care provider: _______________________    Average BP: _______________________________    Graphic 893115 Version 1.0  Consumer Information Use and Disclaimer   This information is not specific medical advice and does not replace information you receive from your health care provider. This is only a brief summary of general information. It does NOT include all information about conditions, illnesses, injuries, tests, procedures, treatments, therapies, discharge instructions or life-style choices that may apply to you. You must talk with  your health care provider for complete information about your health and treatment options. This information should not be used to decide whether or not to accept your health care provider's advice, instructions or recommendations. Only your health care provider has the knowledge and training to provide advice that is right for you. The use of this information is governed by the [employer] End User License Agreement, available at [email or web address]://[email or web address][email or web address]/[email or web address].The use of [employer] content is governed by the [employer] Terms of Use. ©2021 [employer] All rights reserved.  Copyright   © 2021 UpToDate, Inc. and/or its affiliates. All rights reserved.

## 2023-05-31 DIAGNOSIS — E11.9 TYPE 2 DIABETES MELLITUS WITHOUT COMPLICATION, UNSPECIFIED WHETHER LONG TERM INSULIN USE: ICD-10-CM

## 2023-05-31 DIAGNOSIS — E11.9 TYPE 2 DIABETES MELLITUS WITHOUT COMPLICATION: ICD-10-CM

## 2023-06-05 ENCOUNTER — PATIENT MESSAGE (OUTPATIENT)
Dept: ADMINISTRATIVE | Facility: HOSPITAL | Age: 63
End: 2023-06-05

## 2023-06-07 ENCOUNTER — PATIENT OUTREACH (OUTPATIENT)
Dept: ADMINISTRATIVE | Facility: HOSPITAL | Age: 63
End: 2023-06-07

## 2023-06-07 NOTE — PROGRESS NOTES
Population Health Chart Review & Patient Outreach Details:     Reason for Outreach Encounter:     [x]  Non-Compliant Report   []  Payor Report (Hum[employer], PHN, BCBS, MSSP, MCIP, UHC, etc.)   []  Pre-Visit Chart Review     Updates Requested / Reviewed:     [x]  Care Everywhere    []     []  External Sources ([facility], [facility], DIS, etc.)   [x]  Care Team Updated    Patient Outreach Method:    []  Telephone Outreach Completed   [] Successful   [] Left Voicemail   [] Unable to Contact (wrong number, no voicemail)  []  [facility] Outreach Sent  []  Letter Outreach Mailed  []  Fax Sent for External Records  []  External Records Upload    Health Maintenance Topics Addressed and Outreach Outcomes / Actions Taken:        []      Breast Cancer Screening []  Mammo Scheduled      []  External Records Requested     []  Added Reminder to Complete to Upcoming Primary Care Appt Notes     []  Patient Declined     []  Patient Will Call Back to Schedule     []  Patient Will Schedule with External Provider / Order Routed if Applicable             []       Cervical Cancer Screening []  Pap Scheduled      []  External Records Requested     []  Added Reminder to Complete to Upcoming Primary Care Appt Notes     []  Patient Declined     []  Patient Will Call Back to Schedule     []  Patient Will Schedule with External Provider               []          Colorectal Cancer Screening []  Colonoscopy Case Request or Referral Placed     []  External Records Requested     []  Added Reminder to Complete to Upcoming Primary Care Appt Notes     []  Patient Declined     []  Patient Will Call Back to Schedule     []  Patient Will Schedule with External Provider     []  Fit Kit Mailed (add the SmartPhrase under additional notes)     []  Reminded Patient to Complete Home Test             [x]      Diabetic Eye Exam []  Eye Camera Scheduled or Optometry Referral Placed     []  External Records Requested     [x]  Added Reminder to Complete to  Upcoming Nurse Primary Care Appt Notes     []  Patient Declined     []  Patient Will Call Back to Schedule     []  Patient Will Schedule with External Provider             []      Blood Pressure Control []  Primary Care Follow Up Visit Scheduled     []  Remote Blood Pressure Reading Captured     []  Added Reminder to Complete to Upcoming Primary Care Appt Notes     []  Patient Declined     []  Patient Will Call Back / Patient Will Send Portal Message with Reading     []  Patient Will Call Back to Schedule Provider Visit             []       HbA1c & Other Labs []  Lab Appt Scheduled for Due Labs     []  Primary Care Follow Up Visit Scheduled      []  Reminded Patient to Complete Home Test     []  Added Reminder to Complete to Upcoming Primary Care Appt Notes     []  Patient Declined     []  Patient Will Call Back to Schedule     []  Patient Will Schedule with External Provider / Order Routed if Applicable           []    Schedule Primary Care Appt []  Primary Care Appt Scheduled     []  Patient Declined     []  Patient Will Call Back to Schedule     []  Pt Established with External Provider & Updated Care Team             []      Medication Adherence []  Primary Care Appointment Scheduled     []  Added Reminder to Upcoming Primary Care Appt Notes     []  Patient Reminded to  Prescription     []  Patient Declined, Provider Notified if Needed     []  Sent Provider Message to Review and/or Add Exclusion to Problem List             []      Osteoporosis Screening []  DXA Appointment Scheduled     []  External Records Requested     []  Added Reminder to Complete to Upcoming Primary Care Appt Notes     []  Patient Declined     []  Patient Will Call Back to Schedule     []  Patient Will Schedule with External Provider / Order Routed if Applicable     Additional Care Coordinator Notes:         Further Action Needed If Patient Returns Outreach:

## 2023-06-08 ENCOUNTER — CLINICAL SUPPORT (OUTPATIENT)
Dept: FAMILY MEDICINE | Facility: CLINIC | Age: 63
End: 2023-06-08
Payer: COMMERCIAL

## 2023-06-08 ENCOUNTER — TELEPHONE (OUTPATIENT)
Dept: FAMILY MEDICINE | Facility: CLINIC | Age: 63
End: 2023-06-08

## 2023-06-08 VITALS — HEART RATE: 66 BPM | SYSTOLIC BLOOD PRESSURE: 128 MMHG | DIASTOLIC BLOOD PRESSURE: 84 MMHG

## 2023-06-08 DIAGNOSIS — E11.9 NEW ONSET TYPE 2 DIABETES MELLITUS: Chronic | ICD-10-CM

## 2023-06-08 DIAGNOSIS — Z01.30 BP CHECK: Primary | ICD-10-CM

## 2023-06-08 PROCEDURE — 99999 PR PBB SHADOW E&M-EST. PATIENT-LVL II: ICD-10-PCS | Mod: PBBFAC,,,

## 2023-06-08 PROCEDURE — 99999 PR PBB SHADOW E&M-EST. PATIENT-LVL II: CPT | Mod: PBBFAC,,,

## 2023-06-08 NOTE — PROGRESS NOTES
Jaden Greene 62 y.o. male is here today for Blood Pressure check.   History of HTN yes.    Review of patient's allergies indicates:   Allergen Reactions    Lisinopril Other (See Comments)     cough     Creatinine   Date Value Ref Range Status   05/25/2023 0.9 0.5 - 1.4 mg/dL Final     Sodium   Date Value Ref Range Status   05/25/2023 140 136 - 145 mmol/L Final     Potassium   Date Value Ref Range Status   05/25/2023 3.9 3.5 - 5.1 mmol/L Final     Patient verifies taking blood pressure medications on a regular basis at the same time of the day. Pt confirmed  He was out of B{P    Current Outpatient Medications:     amLODIPine (NORVASC) 10 MG tablet, Take 1 tablet (10 mg total) by mouth once daily., Disp: 90 tablet, Rfl: 3    atorvastatin (LIPITOR) 20 MG tablet, TAKE ONE TABLET BY MOUTH ONCE DAILY, Disp: 90 tablet, Rfl: 3    budesonide (PULMICORT) 0.5 mg/2 mL nebulizer solution, Take 2 mLs (0.5 mg total) by nebulization once daily. Controller, Disp: 100 mL, Rfl: 3    chlorthalidone (HYGROTEN) 25 MG Tab, Take 1 tablet (25 mg total) by mouth once daily., Disp: 90 tablet, Rfl: 0    FLUoxetine 20 MG capsule, , Disp: , Rfl:     fluticasone furoate-vilanteroL (BREO ELLIPTA) 100-25 mcg/dose diskus inhaler, Inhale 1 puff into the lungs once daily. Controller, Disp: 1 each, Rfl: 11    levalbuterol (XOPENEX) 1.25 mg/3 mL nebulizer solution, Take 3 mLs (1.25 mg total) by nebulization every 4 (four) hours as needed for Wheezing., Disp: 1 each, Rfl: 3    montelukast (SINGULAIR) 10 mg tablet, TAKE ONE TABLET BY MOUTH DAILY IN THE EVENING, Disp: 90 tablet, Rfl: 3    omeprazole (PRILOSEC) 20 MG capsule, Take 1 capsule (20 mg total) by mouth once daily., Disp: 90 capsule, Rfl: 4    Does patient have record of home blood pressure readings no, thus he does not know exactly what his rsweadings have been averaging.   Since patient will not follow up with Dr. Plascencia until 12/11/23, he did agree to take several blank logs and call to let us  know if his  BP at home was trending upwards to 140/90 or greater, consistently, so that we could see him before December 11th visit.  Last dose of blood pressure medication was taken at 8:00 am this morning 6/8.  Patient denies any dizziness, weakness, chest pain or palpitations, however does report that he is asthmatic and does occasionally get short of breath. He also reports that when  His BP is elevated, he sometimes sees spots.    Disccused with, and provided to the patient, the following HTN. pt. Education materials: 1. Seven simple steps to get an accurate blood pressure reading at least 3 x a week. 3. Krames: Low Salt Diet and Low Salt Choices, Eating Heart Healthy Using the DASH Plan, 4. outline of the Mediterranean Diet. .      Initial BP today was 128/80  Blood pressure reading after 10 minutes was 128/84, Pulse 66.  Dr. Plascencia notified.

## 2023-06-09 ENCOUNTER — PATIENT MESSAGE (OUTPATIENT)
Dept: FAMILY MEDICINE | Facility: CLINIC | Age: 63
End: 2023-06-09

## 2023-06-09 ENCOUNTER — TELEPHONE (OUTPATIENT)
Dept: FAMILY MEDICINE | Facility: CLINIC | Age: 63
End: 2023-06-09

## 2023-06-22 ENCOUNTER — TELEPHONE (OUTPATIENT)
Dept: FAMILY MEDICINE | Facility: CLINIC | Age: 63
End: 2023-06-22

## 2023-07-20 DIAGNOSIS — F32.A DEPRESSION, UNSPECIFIED DEPRESSION TYPE: ICD-10-CM

## 2023-07-20 NOTE — TELEPHONE ENCOUNTER
Care Due:                  Date            Visit Type   Department     Provider  --------------------------------------------------------------------------------                                EP -                              PRIMARY      [facility]  Last Visit: 07-      CARE ([facility])   MEDICINE       Saad Lopez                              EP -                              PRIMARY      [facility]  Next Visit: 12-      CARE ([facility])   MEDICINE       Nic Plascencia                                                            Last  Test          Frequency    Reason                     Performed    Due Date  --------------------------------------------------------------------------------    Office Visit  12 months..  atorvastatin,              07- 07-                             chlorthalidone,                             fluticasone, montelukast,                             omeprazole...............    [employer] Embedded Care Due Messages. Reference number: [id number].   7/20/2023 11:07:28 AM CDT

## 2023-07-23 RX ORDER — FLUOXETINE HYDROCHLORIDE 40 MG/1
CAPSULE ORAL
Qty: 30 CAPSULE | Refills: 0 | Status: SHIPPED | OUTPATIENT
Start: 2023-07-23 | End: 2023-08-02

## 2023-08-02 DIAGNOSIS — F32.A DEPRESSION, UNSPECIFIED DEPRESSION TYPE: ICD-10-CM

## 2023-08-02 RX ORDER — FLUOXETINE HYDROCHLORIDE 40 MG/1
CAPSULE ORAL
Qty: 30 CAPSULE | Refills: 3 | Status: SHIPPED | OUTPATIENT
Start: 2023-08-02

## 2023-08-02 NOTE — TELEPHONE ENCOUNTER
No care due was identified.  [facility] Embedded Care Due Messages. Reference number: [id number].   8/02/2023 10:34:43 AM CDT

## 2023-09-11 ENCOUNTER — PATIENT MESSAGE (OUTPATIENT)
Dept: ADMINISTRATIVE | Facility: HOSPITAL | Age: 63
End: 2023-09-11

## 2023-10-23 ENCOUNTER — HOSPITAL ENCOUNTER (INPATIENT)
Facility: HOSPITAL | Age: 63
LOS: 1 days | DRG: 208 | End: 2023-10-23
Attending: EMERGENCY MEDICINE | Admitting: INTERNAL MEDICINE
Payer: COMMERCIAL

## 2023-10-23 ENCOUNTER — HOSPITAL ENCOUNTER (INPATIENT)
Facility: HOSPITAL | Age: 63
LOS: 1 days | DRG: 951 | End: 2023-10-24
Payer: COMMERCIAL

## 2023-10-23 VITALS
WEIGHT: 231.94 LBS | SYSTOLIC BLOOD PRESSURE: 138 MMHG | DIASTOLIC BLOOD PRESSURE: 63 MMHG | OXYGEN SATURATION: 100 % | HEART RATE: 114 BPM | RESPIRATION RATE: 30 BRPM | TEMPERATURE: 98 F | BODY MASS INDEX: 30.6 KG/M2

## 2023-10-23 DIAGNOSIS — I46.9 CARDIAC ARREST: ICD-10-CM

## 2023-10-23 DIAGNOSIS — R07.9 CHEST PAIN: ICD-10-CM

## 2023-10-23 PROBLEM — J96.01 ACUTE RESPIRATORY FAILURE WITH HYPOXIA AND HYPERCARBIA: Status: ACTIVE | Noted: 2023-10-23

## 2023-10-23 PROBLEM — R79.89 TROPONIN I ABOVE REFERENCE RANGE: Status: ACTIVE | Noted: 2023-10-23

## 2023-10-23 PROBLEM — E11.9 TYPE 2 DIABETES MELLITUS: Chronic | Status: ACTIVE | Noted: 2023-10-23

## 2023-10-23 PROBLEM — G93.1 ANOXIC BRAIN INJURY: Status: ACTIVE | Noted: 2023-10-23

## 2023-10-23 PROBLEM — I44.7 LBBB (LEFT BUNDLE BRANCH BLOCK): Status: ACTIVE | Noted: 2023-10-23

## 2023-10-23 PROBLEM — E87.29 HIGH ANION GAP METABOLIC ACIDOSIS: Status: ACTIVE | Noted: 2023-10-23

## 2023-10-23 PROBLEM — D75.89 MACROCYTOSIS: Status: ACTIVE | Noted: 2023-10-23

## 2023-10-23 PROBLEM — N17.9 AKI (ACUTE KIDNEY INJURY): Status: ACTIVE | Noted: 2023-10-23

## 2023-10-23 PROBLEM — J96.02 ACUTE RESPIRATORY FAILURE WITH HYPOXIA AND HYPERCARBIA: Status: ACTIVE | Noted: 2023-10-23

## 2023-10-23 PROBLEM — E87.6 HYPOKALEMIA: Status: ACTIVE | Noted: 2023-10-23

## 2023-10-23 PROBLEM — D72.829 LEUCOCYTOSIS: Status: ACTIVE | Noted: 2023-10-23

## 2023-10-23 PROBLEM — K52.9 ENTERITIS: Status: ACTIVE | Noted: 2023-10-23

## 2023-10-23 PROBLEM — E87.20 LACTIC ACIDOSIS: Status: ACTIVE | Noted: 2023-10-23

## 2023-10-23 LAB
ABO + RH BLD: NORMAL
ALBUMIN SERPL BCP-MCNC: 2.9 G/DL (ref 3.5–5.2)
ALBUMIN SERPL BCP-MCNC: 3.2 G/DL (ref 3.5–5.2)
ALBUMIN SERPL BCP-MCNC: 3.5 G/DL (ref 3.5–5.2)
ALBUMIN SERPL BCP-MCNC: 3.5 G/DL (ref 3.5–5.2)
ALLENS TEST: ABNORMAL
ALP SERPL-CCNC: 50 U/L (ref 55–135)
ALP SERPL-CCNC: 67 U/L (ref 55–135)
ALP SERPL-CCNC: 89 U/L (ref 55–135)
ALP SERPL-CCNC: 93 U/L (ref 55–135)
ALT SERPL W/O P-5'-P-CCNC: 137 U/L (ref 10–44)
ALT SERPL W/O P-5'-P-CCNC: 269 U/L (ref 10–44)
ALT SERPL W/O P-5'-P-CCNC: 310 U/L (ref 10–44)
ALT SERPL W/O P-5'-P-CCNC: 72 U/L (ref 10–44)
AMPHET+METHAMPHET UR QL: NEGATIVE
AMYLASE SERPL-CCNC: 173 U/L (ref 20–110)
AMYLASE SERPL-CCNC: 217 U/L (ref 20–110)
ANION GAP SERPL CALC-SCNC: 15 MMOL/L (ref 8–16)
ANION GAP SERPL CALC-SCNC: 16 MMOL/L (ref 8–16)
ANION GAP SERPL CALC-SCNC: 16 MMOL/L (ref 8–16)
ANION GAP SERPL CALC-SCNC: 17 MMOL/L (ref 8–16)
APTT PPP: 28.9 SEC (ref 21–32)
APTT PPP: 29.8 SEC (ref 21–32)
AST SERPL-CCNC: 292 U/L (ref 10–40)
AST SERPL-CCNC: 529 U/L (ref 10–40)
AST SERPL-CCNC: 578 U/L (ref 10–40)
AST SERPL-CCNC: 82 U/L (ref 10–40)
BACTERIA #/AREA URNS HPF: ABNORMAL /HPF
BACTERIA #/AREA URNS HPF: NEGATIVE /HPF
BARBITURATES UR QL SCN>200 NG/ML: NEGATIVE
BASOPHILS # BLD AUTO: 0.06 K/UL (ref 0–0.2)
BASOPHILS # BLD AUTO: 0.12 K/UL (ref 0–0.2)
BASOPHILS NFR BLD: 0 % (ref 0–1.9)
BASOPHILS NFR BLD: 0.2 % (ref 0–1.9)
BASOPHILS NFR BLD: 0.9 % (ref 0–1.9)
BENZODIAZ UR QL SCN>200 NG/ML: NEGATIVE
BILIRUB DIRECT SERPL-MCNC: 0.1 MG/DL (ref 0.1–0.3)
BILIRUB DIRECT SERPL-MCNC: 0.1 MG/DL (ref 0.1–0.3)
BILIRUB SERPL-MCNC: 0.4 MG/DL (ref 0.1–1)
BILIRUB SERPL-MCNC: 0.4 MG/DL (ref 0.1–1)
BILIRUB SERPL-MCNC: 0.5 MG/DL (ref 0.1–1)
BILIRUB SERPL-MCNC: 0.5 MG/DL (ref 0.1–1)
BILIRUB UR QL STRIP: NEGATIVE
BILIRUB UR QL STRIP: NEGATIVE
BNP SERPL-MCNC: 136 PG/ML (ref 0–99)
BUN SERPL-MCNC: 24 MG/DL (ref 8–23)
BUN SERPL-MCNC: 32 MG/DL (ref 8–23)
BUN SERPL-MCNC: 38 MG/DL (ref 8–23)
BUN SERPL-MCNC: 38 MG/DL (ref 8–23)
BZE UR QL SCN: NEGATIVE
CALCIUM SERPL-MCNC: 6.7 MG/DL (ref 8.7–10.5)
CALCIUM SERPL-MCNC: 6.8 MG/DL (ref 8.7–10.5)
CALCIUM SERPL-MCNC: 7.3 MG/DL (ref 8.7–10.5)
CALCIUM SERPL-MCNC: 8.2 MG/DL (ref 8.7–10.5)
CANNABINOIDS UR QL SCN: NEGATIVE
CHLORIDE SERPL-SCNC: 101 MMOL/L (ref 95–110)
CHLORIDE SERPL-SCNC: 102 MMOL/L (ref 95–110)
CHLORIDE SERPL-SCNC: 102 MMOL/L (ref 95–110)
CHLORIDE SERPL-SCNC: 105 MMOL/L (ref 95–110)
CLARITY UR: ABNORMAL
CLARITY UR: CLEAR
CO2 SERPL-SCNC: 15 MMOL/L (ref 23–29)
CO2 SERPL-SCNC: 17 MMOL/L (ref 23–29)
CO2 SERPL-SCNC: 19 MMOL/L (ref 23–29)
CO2 SERPL-SCNC: 20 MMOL/L (ref 23–29)
COLOR UR: YELLOW
COLOR UR: YELLOW
CREAT SERPL-MCNC: 1.5 MG/DL (ref 0.5–1.4)
CREAT SERPL-MCNC: 2.2 MG/DL (ref 0.5–1.4)
CREAT SERPL-MCNC: 3.1 MG/DL (ref 0.5–1.4)
CREAT SERPL-MCNC: 3.2 MG/DL (ref 0.5–1.4)
CREAT UR-MCNC: 75.4 MG/DL (ref 23–375)
DELSYS: ABNORMAL
DIFFERENTIAL METHOD: ABNORMAL
EOSINOPHIL # BLD AUTO: 0 K/UL (ref 0–0.5)
EOSINOPHIL # BLD AUTO: 0.3 K/UL (ref 0–0.5)
EOSINOPHIL NFR BLD: 0 % (ref 0–8)
EOSINOPHIL NFR BLD: 0 % (ref 0–8)
EOSINOPHIL NFR BLD: 2.1 % (ref 0–8)
ERYTHROCYTE [DISTWIDTH] IN BLOOD BY AUTOMATED COUNT: 12.2 % (ref 11.5–14.5)
ERYTHROCYTE [DISTWIDTH] IN BLOOD BY AUTOMATED COUNT: 12.4 % (ref 11.5–14.5)
ERYTHROCYTE [DISTWIDTH] IN BLOOD BY AUTOMATED COUNT: 12.5 % (ref 11.5–14.5)
ERYTHROCYTE [SEDIMENTATION RATE] IN BLOOD BY WESTERGREN METHOD: 28 MM/H
ERYTHROCYTE [SEDIMENTATION RATE] IN BLOOD BY WESTERGREN METHOD: 30 MM/H
EST. GFR  (NO RACE VARIABLE): 21.1 ML/MIN/1.73 M^2
EST. GFR  (NO RACE VARIABLE): 21.9 ML/MIN/1.73 M^2
EST. GFR  (NO RACE VARIABLE): 33 ML/MIN/1.73 M^2
EST. GFR  (NO RACE VARIABLE): 52.3 ML/MIN/1.73 M^2
ETHANOL SERPL-MCNC: <10 MG/DL
FIO2: 100
FIO2: 60
FIO2: 80
FLOW: 15
GGT SERPL-CCNC: 61 U/L (ref 8–55)
GGT SERPL-CCNC: 80 U/L (ref 8–55)
GLUCOSE SERPL-MCNC: 290 MG/DL (ref 70–110)
GLUCOSE SERPL-MCNC: 319 MG/DL (ref 70–110)
GLUCOSE SERPL-MCNC: 323 MG/DL (ref 70–110)
GLUCOSE SERPL-MCNC: 373 MG/DL (ref 70–110)
GLUCOSE SERPL-MCNC: 375 MG/DL (ref 70–110)
GLUCOSE SERPL-MCNC: 381 MG/DL (ref 70–110)
GLUCOSE SERPL-MCNC: 402 MG/DL (ref 70–110)
GLUCOSE SERPL-MCNC: 409 MG/DL (ref 70–110)
GLUCOSE SERPL-MCNC: 438 MG/DL (ref 70–110)
GLUCOSE SERPL-MCNC: 463 MG/DL (ref 70–110)
GLUCOSE SERPL-MCNC: 496 MG/DL (ref 70–110)
GLUCOSE SERPL-MCNC: 515 MG/DL (ref 70–110)
GLUCOSE SERPL-MCNC: 521 MG/DL (ref 70–110)
GLUCOSE UR QL STRIP: ABNORMAL
GLUCOSE UR QL STRIP: ABNORMAL
HCO3 UR-SCNC: 14 MMOL/L (ref 24–28)
HCO3 UR-SCNC: 15.8 MMOL/L (ref 24–28)
HCO3 UR-SCNC: 15.9 MMOL/L (ref 24–28)
HCO3 UR-SCNC: 17.5 MMOL/L (ref 24–28)
HCO3 UR-SCNC: 17.6 MMOL/L (ref 24–28)
HCO3 UR-SCNC: 18.4 MMOL/L (ref 24–28)
HCO3 UR-SCNC: 19.1 MMOL/L (ref 24–28)
HCO3 UR-SCNC: 23.5 MMOL/L (ref 24–28)
HCO3 UR-SCNC: 23.7 MMOL/L (ref 24–28)
HCT VFR BLD AUTO: 45 % (ref 40–54)
HCT VFR BLD AUTO: 46.7 % (ref 40–54)
HCT VFR BLD AUTO: 48.9 % (ref 40–54)
HCT VFR BLD CALC: 36 %PCV (ref 36–54)
HCT VFR BLD CALC: 45 %PCV (ref 36–54)
HCT VFR BLD CALC: 50 %PCV (ref 36–54)
HCT VFR BLD CALC: 51 %PCV (ref 36–54)
HGB BLD-MCNC: 14.4 G/DL (ref 14–18)
HGB BLD-MCNC: 16.2 G/DL (ref 14–18)
HGB BLD-MCNC: 16.3 G/DL (ref 14–18)
HGB UR QL STRIP: ABNORMAL
HGB UR QL STRIP: NEGATIVE
HYALINE CASTS #/AREA URNS LPF: 0 /LPF
HYALINE CASTS #/AREA URNS LPF: 15 /LPF
IMM GRANULOCYTES # BLD AUTO: 0.2 K/UL (ref 0–0.04)
IMM GRANULOCYTES # BLD AUTO: 0.66 K/UL (ref 0–0.04)
IMM GRANULOCYTES # BLD AUTO: ABNORMAL K/UL (ref 0–0.04)
IMM GRANULOCYTES NFR BLD AUTO: 0.7 % (ref 0–0.5)
IMM GRANULOCYTES NFR BLD AUTO: 4.7 % (ref 0–0.5)
IMM GRANULOCYTES NFR BLD AUTO: ABNORMAL % (ref 0–0.5)
INR PPP: 1 (ref 0.8–1.2)
INR PPP: 1.1 (ref 0.8–1.2)
INR PPP: 1.2 (ref 0.8–1.2)
INR PPP: 1.2 (ref 0.8–1.2)
KETONES UR QL STRIP: NEGATIVE
KETONES UR QL STRIP: NEGATIVE
LACTATE SERPL-SCNC: 6.4 MMOL/L (ref 0.5–1.9)
LACTATE SERPL-SCNC: 8.6 MMOL/L (ref 0.5–1.9)
LDH SERPL L TO P-CCNC: 1527 U/L (ref 110–260)
LDH SERPL L TO P-CCNC: 1834 U/L (ref 110–260)
LEUKOCYTE ESTERASE UR QL STRIP: ABNORMAL
LEUKOCYTE ESTERASE UR QL STRIP: NEGATIVE
LIPASE SERPL-CCNC: 105 U/L (ref 4–60)
LIPASE SERPL-CCNC: 136 U/L (ref 4–60)
LYMPHOCYTES # BLD AUTO: 2.9 K/UL (ref 1–4.8)
LYMPHOCYTES # BLD AUTO: 6.7 K/UL (ref 1–4.8)
LYMPHOCYTES NFR BLD: 3 % (ref 18–48)
LYMPHOCYTES NFR BLD: 47.5 % (ref 18–48)
LYMPHOCYTES NFR BLD: 9.9 % (ref 18–48)
MAGNESIUM SERPL-MCNC: 1.6 MG/DL (ref 1.6–2.6)
MAGNESIUM SERPL-MCNC: 1.9 MG/DL (ref 1.6–2.6)
MAGNESIUM SERPL-MCNC: 2.3 MG/DL (ref 1.6–2.6)
MCH RBC QN AUTO: 31.3 PG (ref 27–31)
MCH RBC QN AUTO: 31.8 PG (ref 27–31)
MCH RBC QN AUTO: 31.9 PG (ref 27–31)
MCHC RBC AUTO-ENTMCNC: 32 G/DL (ref 32–36)
MCHC RBC AUTO-ENTMCNC: 33.3 G/DL (ref 32–36)
MCHC RBC AUTO-ENTMCNC: 34.7 G/DL (ref 32–36)
MCV RBC AUTO: 100 FL (ref 82–98)
MCV RBC AUTO: 92 FL (ref 82–98)
MCV RBC AUTO: 94 FL (ref 82–98)
MICROSCOPIC COMMENT: ABNORMAL
MICROSCOPIC COMMENT: ABNORMAL
MIN VOL: 19
MIN VOL: 19
MIN VOL: 19.3
MIN VOL: 20.3
MIN VOL: 20.3
MODE: ABNORMAL
MONOCYTES # BLD AUTO: 0.5 K/UL (ref 0.3–1)
MONOCYTES # BLD AUTO: 1.3 K/UL (ref 0.3–1)
MONOCYTES NFR BLD: 3 % (ref 4–15)
MONOCYTES NFR BLD: 3.8 % (ref 4–15)
MONOCYTES NFR BLD: 4.3 % (ref 4–15)
NEUTROPHILS # BLD AUTO: 25.2 K/UL (ref 1.8–7.7)
NEUTROPHILS # BLD AUTO: 5.8 K/UL (ref 1.8–7.7)
NEUTROPHILS NFR BLD: 41 % (ref 38–73)
NEUTROPHILS NFR BLD: 82 % (ref 38–73)
NEUTROPHILS NFR BLD: 84.9 % (ref 38–73)
NEUTS BAND NFR BLD MANUAL: 12 %
NITRITE UR QL STRIP: NEGATIVE
NITRITE UR QL STRIP: NEGATIVE
NRBC BLD-RTO: 0 /100 WBC
OPIATES UR QL SCN: NEGATIVE
PCO2 BLDA: 112.3 MMHG (ref 35–45)
PCO2 BLDA: 30.4 MMHG (ref 35–45)
PCO2 BLDA: 31.6 MMHG (ref 35–45)
PCO2 BLDA: 32.4 MMHG (ref 35–45)
PCO2 BLDA: 36 MMHG (ref 35–45)
PCO2 BLDA: 39.6 MMHG (ref 35–45)
PCO2 BLDA: 42.6 MMHG (ref 35–45)
PCO2 BLDA: 43.7 MMHG (ref 35–45)
PCO2 BLDA: 63.2 MMHG (ref 35–45)
PCP UR QL SCN>25 NG/ML: NEGATIVE
PEEP: 0
PEEP: 10
PH SMN: 6.93 [PH] (ref 7.35–7.45)
PH SMN: 7.17 [PH] (ref 7.35–7.45)
PH SMN: 7.18 [PH] (ref 7.35–7.45)
PH SMN: 7.18 [PH] (ref 7.35–7.45)
PH SMN: 7.2 [PH] (ref 7.35–7.45)
PH SMN: 7.29 [PH] (ref 7.35–7.45)
PH SMN: 7.34 [PH] (ref 7.35–7.45)
PH SMN: 7.35 [PH] (ref 7.35–7.45)
PH SMN: 7.39 [PH] (ref 7.35–7.45)
PH UR STRIP: 6 [PH] (ref 5–8)
PH UR STRIP: 7 [PH] (ref 5–8)
PHOSPHATE SERPL-MCNC: 1.4 MG/DL (ref 2.7–4.5)
PHOSPHATE SERPL-MCNC: 2.4 MG/DL (ref 2.7–4.5)
PIP: 32
PIP: 34
PIP: 34
PIP: 35
PLATELET # BLD AUTO: 227 K/UL (ref 150–450)
PLATELET # BLD AUTO: 306 K/UL (ref 150–450)
PLATELET # BLD AUTO: 333 K/UL (ref 150–450)
PLATELET BLD QL SMEAR: ABNORMAL
PMV BLD AUTO: 10 FL (ref 9.2–12.9)
PMV BLD AUTO: 10 FL (ref 9.2–12.9)
PMV BLD AUTO: 10.3 FL (ref 9.2–12.9)
PO2 BLDA: 194 MMHG (ref 80–100)
PO2 BLDA: 202 MMHG (ref 80–100)
PO2 BLDA: 266 MMHG (ref 80–100)
PO2 BLDA: 33 MMHG (ref 80–100)
PO2 BLDA: 500 MMHG (ref 80–100)
PO2 BLDA: 58 MMHG (ref 80–100)
PO2 BLDA: 90 MMHG (ref 80–100)
PO2 BLDA: 93 MMHG (ref 80–100)
PO2 BLDA: 94 MMHG (ref 80–100)
POC BE: -13 MMOL/L
POC BE: -13 MMOL/L
POC BE: -14 MMOL/L
POC BE: -5 MMOL/L
POC BE: -7 MMOL/L
POC BE: -7 MMOL/L
POC BE: -8 MMOL/L
POC BE: -8 MMOL/L
POC BE: -9 MMOL/L
POC IONIZED CALCIUM: 0.97 MMOL/L (ref 1.06–1.42)
POC IONIZED CALCIUM: 0.99 MMOL/L (ref 1.06–1.42)
POC IONIZED CALCIUM: 1 MMOL/L (ref 1.06–1.42)
POC IONIZED CALCIUM: 1.11 MMOL/L (ref 1.06–1.42)
POC SATURATED O2: 100 % (ref 95–100)
POC SATURATED O2: 30 % (ref 95–100)
POC SATURATED O2: 81 % (ref 95–100)
POC SATURATED O2: 95 % (ref 95–100)
POC SATURATED O2: 95 % (ref 95–100)
POC SATURATED O2: 96 % (ref 95–100)
POC TCO2: 15 MMOL/L (ref 23–27)
POC TCO2: 17 MMOL/L (ref 23–27)
POC TCO2: 17 MMOL/L (ref 23–27)
POC TCO2: 18 MMOL/L (ref 23–27)
POC TCO2: 19 MMOL/L (ref 23–27)
POC TCO2: 19 MMOL/L (ref 23–27)
POC TCO2: 20 MMOL/L (ref 23–27)
POC TCO2: 26 MMOL/L (ref 23–27)
POC TCO2: 27 MMOL/L (ref 23–27)
POTASSIUM BLD-SCNC: 3 MMOL/L (ref 3.5–5.1)
POTASSIUM BLD-SCNC: 3.5 MMOL/L (ref 3.5–5.1)
POTASSIUM BLD-SCNC: 5.7 MMOL/L (ref 3.5–5.1)
POTASSIUM BLD-SCNC: 5.9 MMOL/L (ref 3.5–5.1)
POTASSIUM SERPL-SCNC: 3.4 MMOL/L (ref 3.5–5.1)
POTASSIUM SERPL-SCNC: 3.8 MMOL/L (ref 3.5–5.1)
POTASSIUM SERPL-SCNC: 4.3 MMOL/L (ref 3.5–5.1)
POTASSIUM SERPL-SCNC: 5.9 MMOL/L (ref 3.5–5.1)
PROT SERPL-MCNC: 4.9 G/DL (ref 6–8.4)
PROT SERPL-MCNC: 5.7 G/DL (ref 6–8.4)
PROT SERPL-MCNC: 6 G/DL (ref 6–8.4)
PROT SERPL-MCNC: 6.2 G/DL (ref 6–8.4)
PROT UR QL STRIP: ABNORMAL
PROT UR QL STRIP: NEGATIVE
PROTHROMBIN TIME: 11.5 SEC (ref 9–12.5)
PROTHROMBIN TIME: 11.9 SEC (ref 9–12.5)
PROTHROMBIN TIME: 13 SEC (ref 9–12.5)
PROTHROMBIN TIME: 13.5 SEC (ref 9–12.5)
RBC # BLD AUTO: 4.51 M/UL (ref 4.6–6.2)
RBC # BLD AUTO: 5.1 M/UL (ref 4.6–6.2)
RBC # BLD AUTO: 5.2 M/UL (ref 4.6–6.2)
RBC #/AREA URNS HPF: 2 /HPF (ref 0–4)
RBC #/AREA URNS HPF: 6 /HPF (ref 0–4)
SAMPLE: ABNORMAL
SARS-COV-2 RDRP RESP QL NAA+PROBE: NEGATIVE
SITE: ABNORMAL
SODIUM BLD-SCNC: 133 MMOL/L (ref 136–145)
SODIUM BLD-SCNC: 135 MMOL/L (ref 136–145)
SODIUM BLD-SCNC: 135 MMOL/L (ref 136–145)
SODIUM BLD-SCNC: 139 MMOL/L (ref 136–145)
SODIUM SERPL-SCNC: 133 MMOL/L (ref 136–145)
SODIUM SERPL-SCNC: 137 MMOL/L (ref 136–145)
SODIUM SERPL-SCNC: 137 MMOL/L (ref 136–145)
SODIUM SERPL-SCNC: 138 MMOL/L (ref 136–145)
SP GR UR STRIP: 1.02 (ref 1–1.03)
SP GR UR STRIP: 1.03 (ref 1–1.03)
SP02: 100
SP02: 68
SP02: 95
SP02: 99
SP02: 99
SQUAMOUS #/AREA URNS HPF: 0 /HPF
SQUAMOUS #/AREA URNS HPF: 0 /HPF
TOXICOLOGY INFORMATION: NORMAL
TROPONIN I SERPL HS-MCNC: 1073 PG/ML (ref 0–14.9)
TROPONIN I SERPL HS-MCNC: ABNORMAL PG/ML (ref 0–14.9)
URN SPEC COLLECT METH UR: ABNORMAL
URN SPEC COLLECT METH UR: ABNORMAL
UROBILINOGEN UR STRIP-ACNC: NEGATIVE EU/DL
UROBILINOGEN UR STRIP-ACNC: NEGATIVE EU/DL
VT: 640
WBC # BLD AUTO: 14.03 K/UL (ref 3.9–12.7)
WBC # BLD AUTO: 29.69 K/UL (ref 3.9–12.7)
WBC # BLD AUTO: 37.91 K/UL (ref 3.9–12.7)
WBC #/AREA URNS HPF: 0 /HPF (ref 0–5)
WBC #/AREA URNS HPF: >100 /HPF (ref 0–5)
YEAST URNS QL MICRO: ABNORMAL
YEAST URNS QL MICRO: ABNORMAL

## 2023-10-23 PROCEDURE — 85014 HEMATOCRIT: CPT

## 2023-10-23 PROCEDURE — 83036 HEMOGLOBIN GLYCOSYLATED A1C: CPT | Mod: 91

## 2023-10-23 PROCEDURE — 63600175 PHARM REV CODE 636 W HCPCS: Performed by: INTERNAL MEDICINE

## 2023-10-23 PROCEDURE — 99900026 HC AIRWAY MAINTENANCE (STAT)

## 2023-10-23 PROCEDURE — 83690 ASSAY OF LIPASE: CPT | Mod: 91

## 2023-10-23 PROCEDURE — 99291 CRITICAL CARE FIRST HOUR: CPT

## 2023-10-23 PROCEDURE — 85610 PROTHROMBIN TIME: CPT | Performed by: EMERGENCY MEDICINE

## 2023-10-23 PROCEDURE — 96365 THER/PROPH/DIAG IV INF INIT: CPT

## 2023-10-23 PROCEDURE — 63600175 PHARM REV CODE 636 W HCPCS: Performed by: EMERGENCY MEDICINE

## 2023-10-23 PROCEDURE — 80307 DRUG TEST PRSMV CHEM ANLYZR: CPT | Performed by: INTERNAL MEDICINE

## 2023-10-23 PROCEDURE — 31500 INSERT EMERGENCY AIRWAY: CPT

## 2023-10-23 PROCEDURE — 85025 COMPLETE CBC W/AUTO DIFF WBC: CPT | Performed by: EMERGENCY MEDICINE

## 2023-10-23 PROCEDURE — 99900035 HC TECH TIME PER 15 MIN (STAT)

## 2023-10-23 PROCEDURE — 94799 UNLISTED PULMONARY SVC/PX: CPT

## 2023-10-23 PROCEDURE — 80053 COMPREHEN METABOLIC PANEL: CPT | Mod: 91 | Performed by: INTERNAL MEDICINE

## 2023-10-23 PROCEDURE — 82077 ASSAY SPEC XCP UR&BREATH IA: CPT | Performed by: EMERGENCY MEDICINE

## 2023-10-23 PROCEDURE — 83735 ASSAY OF MAGNESIUM: CPT | Mod: 91

## 2023-10-23 PROCEDURE — 63600175 PHARM REV CODE 636 W HCPCS: Performed by: STUDENT IN AN ORGANIZED HEALTH CARE EDUCATION/TRAINING PROGRAM

## 2023-10-23 PROCEDURE — 94003 VENT MGMT INPAT SUBQ DAY: CPT

## 2023-10-23 PROCEDURE — 81001 URINALYSIS AUTO W/SCOPE: CPT | Performed by: EMERGENCY MEDICINE

## 2023-10-23 PROCEDURE — 87040 BLOOD CULTURE FOR BACTERIA: CPT | Mod: 59

## 2023-10-23 PROCEDURE — 80053 COMPREHEN METABOLIC PANEL: CPT | Mod: 91

## 2023-10-23 PROCEDURE — 94761 N-INVAS EAR/PLS OXIMETRY MLT: CPT

## 2023-10-23 PROCEDURE — 32551 INSERTION OF CHEST TUBE: CPT | Mod: LT,,, | Performed by: INTERNAL MEDICINE

## 2023-10-23 PROCEDURE — 93005 ELECTROCARDIOGRAM TRACING: CPT | Performed by: INTERNAL MEDICINE

## 2023-10-23 PROCEDURE — 36415 COLL VENOUS BLD VENIPUNCTURE: CPT | Performed by: EMERGENCY MEDICINE

## 2023-10-23 PROCEDURE — 36569 INSJ PICC 5 YR+ W/O IMAGING: CPT

## 2023-10-23 PROCEDURE — C9113 INJ PANTOPRAZOLE SODIUM, VIA: HCPCS | Performed by: INTERNAL MEDICINE

## 2023-10-23 PROCEDURE — 94002 VENT MGMT INPAT INIT DAY: CPT

## 2023-10-23 PROCEDURE — 20000000 HC ICU ROOM

## 2023-10-23 PROCEDURE — 25000003 PHARM REV CODE 250: Performed by: INTERNAL MEDICINE

## 2023-10-23 PROCEDURE — P9045 ALBUMIN (HUMAN), 5%, 250 ML: HCPCS | Mod: JZ,JG

## 2023-10-23 PROCEDURE — 82248 BILIRUBIN DIRECT: CPT | Mod: 91

## 2023-10-23 PROCEDURE — 87635 SARS-COV-2 COVID-19 AMP PRB: CPT

## 2023-10-23 PROCEDURE — 27000221 HC OXYGEN, UP TO 24 HOURS

## 2023-10-23 PROCEDURE — 82803 BLOOD GASES ANY COMBINATION: CPT

## 2023-10-23 PROCEDURE — 32551 PR TUBE THORACOSTOMY INCLUDES WATER SEAL: ICD-10-PCS | Mod: LT,,, | Performed by: INTERNAL MEDICINE

## 2023-10-23 PROCEDURE — 82962 GLUCOSE BLOOD TEST: CPT

## 2023-10-23 PROCEDURE — 25000003 PHARM REV CODE 250

## 2023-10-23 PROCEDURE — P9047 ALBUMIN (HUMAN), 25%, 50ML: HCPCS | Mod: JZ,JG

## 2023-10-23 PROCEDURE — 84484 ASSAY OF TROPONIN QUANT: CPT | Performed by: EMERGENCY MEDICINE

## 2023-10-23 PROCEDURE — 63600175 PHARM REV CODE 636 W HCPCS

## 2023-10-23 PROCEDURE — 82977 ASSAY OF GGT: CPT

## 2023-10-23 PROCEDURE — 83036 HEMOGLOBIN GLYCOSYLATED A1C: CPT | Performed by: INTERNAL MEDICINE

## 2023-10-23 PROCEDURE — 80053 COMPREHEN METABOLIC PANEL: CPT | Performed by: EMERGENCY MEDICINE

## 2023-10-23 PROCEDURE — C1751 CATH, INF, PER/CENT/MIDLINE: HCPCS

## 2023-10-23 PROCEDURE — 84100 ASSAY OF PHOSPHORUS: CPT

## 2023-10-23 PROCEDURE — 37799 UNLISTED PX VASCULAR SURGERY: CPT

## 2023-10-23 PROCEDURE — 36600 WITHDRAWAL OF ARTERIAL BLOOD: CPT

## 2023-10-23 PROCEDURE — 93010 ELECTROCARDIOGRAM REPORT: CPT | Mod: ,,, | Performed by: INTERNAL MEDICINE

## 2023-10-23 PROCEDURE — 83615 LACTATE (LD) (LDH) ENZYME: CPT | Mod: 91

## 2023-10-23 PROCEDURE — 84132 ASSAY OF SERUM POTASSIUM: CPT

## 2023-10-23 PROCEDURE — 85025 COMPLETE CBC W/AUTO DIFF WBC: CPT | Mod: 91

## 2023-10-23 PROCEDURE — 99291 CRITICAL CARE FIRST HOUR: CPT | Mod: 25,,, | Performed by: INTERNAL MEDICINE

## 2023-10-23 PROCEDURE — 82150 ASSAY OF AMYLASE: CPT | Mod: 91

## 2023-10-23 PROCEDURE — 96375 TX/PRO/DX INJ NEW DRUG ADDON: CPT

## 2023-10-23 PROCEDURE — 87070 CULTURE OTHR SPECIMN AEROBIC: CPT | Mod: 59

## 2023-10-23 PROCEDURE — 82330 ASSAY OF CALCIUM: CPT

## 2023-10-23 PROCEDURE — 84295 ASSAY OF SERUM SODIUM: CPT

## 2023-10-23 PROCEDURE — 84484 ASSAY OF TROPONIN QUANT: CPT | Mod: 91 | Performed by: INTERNAL MEDICINE

## 2023-10-23 PROCEDURE — 83735 ASSAY OF MAGNESIUM: CPT | Mod: 91 | Performed by: INTERNAL MEDICINE

## 2023-10-23 PROCEDURE — 99291 PR CRITICAL CARE, E/M 30-74 MINUTES: ICD-10-PCS | Mod: 25,,, | Performed by: INTERNAL MEDICINE

## 2023-10-23 PROCEDURE — 85610 PROTHROMBIN TIME: CPT | Mod: 91

## 2023-10-23 PROCEDURE — 87205 SMEAR GRAM STAIN: CPT | Mod: 59

## 2023-10-23 PROCEDURE — 87205 SMEAR GRAM STAIN: CPT | Performed by: EMERGENCY MEDICINE

## 2023-10-23 PROCEDURE — 81001 URINALYSIS AUTO W/SCOPE: CPT | Mod: 91

## 2023-10-23 PROCEDURE — 93010 ELECTROCARDIOGRAM REPORT: CPT | Mod: 76,,, | Performed by: INTERNAL MEDICINE

## 2023-10-23 PROCEDURE — 25000242 PHARM REV CODE 250 ALT 637 W/ HCPCS

## 2023-10-23 PROCEDURE — 85027 COMPLETE CBC AUTOMATED: CPT | Performed by: INTERNAL MEDICINE

## 2023-10-23 PROCEDURE — 86901 BLOOD TYPING SEROLOGIC RH(D): CPT

## 2023-10-23 PROCEDURE — 87040 BLOOD CULTURE FOR BACTERIA: CPT | Mod: 59 | Performed by: EMERGENCY MEDICINE

## 2023-10-23 PROCEDURE — 85007 BL SMEAR W/DIFF WBC COUNT: CPT | Performed by: INTERNAL MEDICINE

## 2023-10-23 PROCEDURE — 94640 AIRWAY INHALATION TREATMENT: CPT

## 2023-10-23 PROCEDURE — 96366 THER/PROPH/DIAG IV INF ADDON: CPT

## 2023-10-23 PROCEDURE — 83605 ASSAY OF LACTIC ACID: CPT | Mod: 91 | Performed by: INTERNAL MEDICINE

## 2023-10-23 PROCEDURE — 85610 PROTHROMBIN TIME: CPT | Mod: 91 | Performed by: INTERNAL MEDICINE

## 2023-10-23 PROCEDURE — 83880 ASSAY OF NATRIURETIC PEPTIDE: CPT | Performed by: EMERGENCY MEDICINE

## 2023-10-23 PROCEDURE — 85730 THROMBOPLASTIN TIME PARTIAL: CPT | Mod: 91

## 2023-10-23 PROCEDURE — 87070 CULTURE OTHR SPECIMN AEROBIC: CPT | Performed by: EMERGENCY MEDICINE

## 2023-10-23 PROCEDURE — U0002 COVID-19 LAB TEST NON-CDC: HCPCS

## 2023-10-23 PROCEDURE — 87086 URINE CULTURE/COLONY COUNT: CPT

## 2023-10-23 PROCEDURE — 93010 EKG 12-LEAD: ICD-10-PCS | Mod: ,,, | Performed by: INTERNAL MEDICINE

## 2023-10-23 PROCEDURE — 83605 ASSAY OF LACTIC ACID: CPT | Performed by: EMERGENCY MEDICINE

## 2023-10-23 PROCEDURE — 83735 ASSAY OF MAGNESIUM: CPT | Performed by: EMERGENCY MEDICINE

## 2023-10-23 PROCEDURE — 25500020 PHARM REV CODE 255: Performed by: EMERGENCY MEDICINE

## 2023-10-23 RX ORDER — POTASSIUM CHLORIDE 7.45 MG/ML
60 INJECTION INTRAVENOUS
Status: DISCONTINUED | OUTPATIENT
Start: 2023-10-23 | End: 2023-10-23 | Stop reason: HOSPADM

## 2023-10-23 RX ORDER — EPINEPHRINE 0.1 MG/ML
INJECTION INTRAVENOUS CODE/TRAUMA/SEDATION MEDICATION
Status: COMPLETED | OUTPATIENT
Start: 2023-10-23 | End: 2023-10-23

## 2023-10-23 RX ORDER — NOREPINEPHRINE BITARTRATE/D5W 4MG/250ML
PLASTIC BAG, INJECTION (ML) INTRAVENOUS
Status: COMPLETED
Start: 2023-10-23 | End: 2023-10-23

## 2023-10-23 RX ORDER — MAGNESIUM SULFATE HEPTAHYDRATE 40 MG/ML
2 INJECTION, SOLUTION INTRAVENOUS ONCE
Status: COMPLETED | OUTPATIENT
Start: 2023-10-23 | End: 2023-10-23

## 2023-10-23 RX ORDER — FUROSEMIDE 10 MG/ML
40 INJECTION INTRAMUSCULAR; INTRAVENOUS ONCE
Status: COMPLETED | OUTPATIENT
Start: 2023-10-23 | End: 2023-10-23

## 2023-10-23 RX ORDER — CALCIUM GLUCONATE 20 MG/ML
1 INJECTION, SOLUTION INTRAVENOUS
Status: DISCONTINUED | OUTPATIENT
Start: 2023-10-23 | End: 2023-10-23 | Stop reason: HOSPADM

## 2023-10-23 RX ORDER — BUMETANIDE 0.25 MG/ML
1 INJECTION INTRAMUSCULAR; INTRAVENOUS ONCE
Status: COMPLETED | OUTPATIENT
Start: 2023-10-24 | End: 2023-10-23

## 2023-10-23 RX ORDER — IPRATROPIUM BROMIDE 0.5 MG/2.5ML
0.5 SOLUTION RESPIRATORY (INHALATION) EVERY 4 HOURS
Status: DISCONTINUED | OUTPATIENT
Start: 2023-10-23 | End: 2023-10-24 | Stop reason: HOSPADM

## 2023-10-23 RX ORDER — PHENYLEPHRINE HCL IN 0.9% NACL 20MG/250ML
0-5 PLASTIC BAG, INJECTION (ML) INTRAVENOUS CONTINUOUS
Status: DISCONTINUED | OUTPATIENT
Start: 2023-10-23 | End: 2023-10-23 | Stop reason: HOSPADM

## 2023-10-23 RX ORDER — MUPIROCIN 20 MG/G
OINTMENT TOPICAL 2 TIMES DAILY
Status: DISCONTINUED | OUTPATIENT
Start: 2023-10-23 | End: 2023-10-23 | Stop reason: HOSPADM

## 2023-10-23 RX ORDER — SODIUM BICARBONATE 1 MEQ/ML
SYRINGE (ML) INTRAVENOUS
Status: COMPLETED
Start: 2023-10-23 | End: 2023-10-23

## 2023-10-23 RX ORDER — SODIUM CHLORIDE 0.9 % (FLUSH) 0.9 %
10 SYRINGE (ML) INJECTION EVERY 6 HOURS PRN
Status: DISCONTINUED | OUTPATIENT
Start: 2023-10-23 | End: 2023-10-23 | Stop reason: HOSPADM

## 2023-10-23 RX ORDER — NOREPINEPHRINE BITARTRATE/D5W 4MG/250ML
0-3 PLASTIC BAG, INJECTION (ML) INTRAVENOUS CONTINUOUS
Status: DISCONTINUED | OUTPATIENT
Start: 2023-10-23 | End: 2023-10-23

## 2023-10-23 RX ORDER — ONDANSETRON 2 MG/ML
4 INJECTION INTRAMUSCULAR; INTRAVENOUS EVERY 8 HOURS PRN
Status: DISCONTINUED | OUTPATIENT
Start: 2023-10-23 | End: 2023-10-23 | Stop reason: HOSPADM

## 2023-10-23 RX ORDER — POTASSIUM CHLORIDE 7.45 MG/ML
80 INJECTION INTRAVENOUS
Status: DISCONTINUED | OUTPATIENT
Start: 2023-10-23 | End: 2023-10-23 | Stop reason: HOSPADM

## 2023-10-23 RX ORDER — CALCIUM GLUCONATE 20 MG/ML
1 INJECTION, SOLUTION INTRAVENOUS ONCE
Status: COMPLETED | OUTPATIENT
Start: 2023-10-23 | End: 2023-10-23

## 2023-10-23 RX ORDER — INDOMETHACIN 25 MG/1
50 CAPSULE ORAL ONCE
Status: DISCONTINUED | OUTPATIENT
Start: 2023-10-23 | End: 2023-10-23

## 2023-10-23 RX ORDER — SODIUM BICARBONATE 1 MEQ/ML
100 SYRINGE (ML) INTRAVENOUS ONCE
Status: COMPLETED | OUTPATIENT
Start: 2023-10-23 | End: 2023-10-23

## 2023-10-23 RX ORDER — POTASSIUM CHLORIDE 7.45 MG/ML
40 INJECTION INTRAVENOUS
Status: DISCONTINUED | OUTPATIENT
Start: 2023-10-23 | End: 2023-10-23 | Stop reason: HOSPADM

## 2023-10-23 RX ORDER — NALOXONE HCL 0.4 MG/ML
0.02 VIAL (ML) INJECTION
Status: DISCONTINUED | OUTPATIENT
Start: 2023-10-23 | End: 2023-10-23 | Stop reason: HOSPADM

## 2023-10-23 RX ORDER — GLUCAGON 1 MG
1 KIT INJECTION
Status: DISCONTINUED | OUTPATIENT
Start: 2023-10-23 | End: 2023-10-23

## 2023-10-23 RX ORDER — SODIUM BICARBONATE 1 MEQ/ML
50 SYRINGE (ML) INTRAVENOUS ONCE
Status: COMPLETED | OUTPATIENT
Start: 2023-10-23 | End: 2023-10-23

## 2023-10-23 RX ORDER — MAGNESIUM SULFATE HEPTAHYDRATE 40 MG/ML
4 INJECTION, SOLUTION INTRAVENOUS
Status: DISCONTINUED | OUTPATIENT
Start: 2023-10-23 | End: 2023-10-23 | Stop reason: HOSPADM

## 2023-10-23 RX ORDER — ALBUMIN HUMAN 250 G/1000ML
25 SOLUTION INTRAVENOUS ONCE
Status: COMPLETED | OUTPATIENT
Start: 2023-10-23 | End: 2023-10-23

## 2023-10-23 RX ORDER — DEXTROSE MONOHYDRATE 50 MG/ML
INJECTION, SOLUTION INTRAVENOUS CONTINUOUS
Status: DISCONTINUED | OUTPATIENT
Start: 2023-10-23 | End: 2023-10-24 | Stop reason: HOSPADM

## 2023-10-23 RX ORDER — LEVALBUTEROL INHALATION SOLUTION 1.25 MG/3ML
1.25 SOLUTION RESPIRATORY (INHALATION) EVERY 8 HOURS
Status: DISCONTINUED | OUTPATIENT
Start: 2023-10-23 | End: 2023-10-24 | Stop reason: HOSPADM

## 2023-10-23 RX ORDER — HEPARIN SODIUM 5000 [USP'U]/ML
5000 INJECTION, SOLUTION INTRAVENOUS; SUBCUTANEOUS EVERY 8 HOURS
Status: DISCONTINUED | OUTPATIENT
Start: 2023-10-23 | End: 2023-10-24 | Stop reason: HOSPADM

## 2023-10-23 RX ORDER — INSULIN ASPART 100 [IU]/ML
0-10 INJECTION, SOLUTION INTRAVENOUS; SUBCUTANEOUS EVERY 6 HOURS PRN
Status: DISCONTINUED | OUTPATIENT
Start: 2023-10-23 | End: 2023-10-23 | Stop reason: HOSPADM

## 2023-10-23 RX ORDER — ALBUMIN HUMAN 50 G/1000ML
25 SOLUTION INTRAVENOUS ONCE
Status: COMPLETED | OUTPATIENT
Start: 2023-10-23 | End: 2023-10-23

## 2023-10-23 RX ORDER — CALCIUM GLUCONATE 20 MG/ML
3 INJECTION, SOLUTION INTRAVENOUS
Status: DISCONTINUED | OUTPATIENT
Start: 2023-10-23 | End: 2023-10-23 | Stop reason: HOSPADM

## 2023-10-23 RX ORDER — CHLORHEXIDINE GLUCONATE ORAL RINSE 1.2 MG/ML
15 SOLUTION DENTAL 2 TIMES DAILY
Status: DISCONTINUED | OUTPATIENT
Start: 2023-10-23 | End: 2023-10-23 | Stop reason: HOSPADM

## 2023-10-23 RX ORDER — PANTOPRAZOLE SODIUM 40 MG/10ML
40 INJECTION, POWDER, LYOPHILIZED, FOR SOLUTION INTRAVENOUS 2 TIMES DAILY
Status: DISCONTINUED | OUTPATIENT
Start: 2023-10-23 | End: 2023-10-23 | Stop reason: HOSPADM

## 2023-10-23 RX ORDER — MAGNESIUM SULFATE HEPTAHYDRATE 40 MG/ML
2 INJECTION, SOLUTION INTRAVENOUS
Status: DISCONTINUED | OUTPATIENT
Start: 2023-10-23 | End: 2023-10-23 | Stop reason: HOSPADM

## 2023-10-23 RX ORDER — HEPARIN SODIUM 5000 [USP'U]/ML
5000 INJECTION, SOLUTION INTRAVENOUS; SUBCUTANEOUS EVERY 8 HOURS
Status: DISCONTINUED | OUTPATIENT
Start: 2023-10-23 | End: 2023-10-23 | Stop reason: HOSPADM

## 2023-10-23 RX ORDER — CALCIUM GLUCONATE 20 MG/ML
2 INJECTION, SOLUTION INTRAVENOUS
Status: DISCONTINUED | OUTPATIENT
Start: 2023-10-23 | End: 2023-10-23 | Stop reason: HOSPADM

## 2023-10-23 RX ORDER — IBUPROFEN 200 MG
16 TABLET ORAL
Status: DISCONTINUED | OUTPATIENT
Start: 2023-10-23 | End: 2023-10-23 | Stop reason: HOSPADM

## 2023-10-23 RX ORDER — SODIUM CHLORIDE 450 MG/100ML
INJECTION, SOLUTION INTRAVENOUS CONTINUOUS
Status: DISCONTINUED | OUTPATIENT
Start: 2023-10-23 | End: 2023-10-24 | Stop reason: HOSPADM

## 2023-10-23 RX ORDER — IBUPROFEN 200 MG
24 TABLET ORAL
Status: DISCONTINUED | OUTPATIENT
Start: 2023-10-23 | End: 2023-10-23 | Stop reason: HOSPADM

## 2023-10-23 RX ORDER — PROPOFOL 10 MG/ML
0-50 INJECTION, EMULSION INTRAVENOUS CONTINUOUS
Status: DISCONTINUED | OUTPATIENT
Start: 2023-10-23 | End: 2023-10-23 | Stop reason: HOSPADM

## 2023-10-23 RX ORDER — POTASSIUM CHLORIDE 7.45 MG/ML
10 INJECTION INTRAVENOUS
Status: DISCONTINUED | OUTPATIENT
Start: 2023-10-23 | End: 2023-10-23

## 2023-10-23 RX ORDER — GLUCAGON 1 MG
1 KIT INJECTION
Status: DISCONTINUED | OUTPATIENT
Start: 2023-10-23 | End: 2023-10-23 | Stop reason: HOSPADM

## 2023-10-23 RX ORDER — NOREPINEPHRINE BITARTRATE/D5W 4MG/250ML
PLASTIC BAG, INJECTION (ML) INTRAVENOUS
Status: DISCONTINUED
Start: 2023-10-23 | End: 2023-10-23 | Stop reason: HOSPADM

## 2023-10-23 RX ORDER — MUPIROCIN 20 MG/G
OINTMENT TOPICAL 2 TIMES DAILY
Status: DISCONTINUED | OUTPATIENT
Start: 2023-10-23 | End: 2023-10-24 | Stop reason: HOSPADM

## 2023-10-23 RX ADMIN — ALBUMIN (HUMAN) 25 G: 12.5 SOLUTION INTRAVENOUS at 04:10

## 2023-10-23 RX ADMIN — NOREPINEPHRINE BITARTRATE 0.08 MCG/KG/MIN: 4 INJECTION, SOLUTION INTRAVENOUS at 10:10

## 2023-10-23 RX ADMIN — Medication 0.5 MCG/KG/MIN: at 11:10

## 2023-10-23 RX ADMIN — CALCIUM GLUCONATE 1 G: 20 INJECTION, SOLUTION INTRAVENOUS at 07:10

## 2023-10-23 RX ADMIN — PIPERACILLIN SODIUM AND TAZOBACTAM SODIUM 3.38 G: 3; .375 INJECTION, POWDER, LYOPHILIZED, FOR SOLUTION INTRAVENOUS at 01:10

## 2023-10-23 RX ADMIN — LEVALBUTEROL HYDROCHLORIDE 1.25 MG: 1.25 SOLUTION RESPIRATORY (INHALATION) at 03:10

## 2023-10-23 RX ADMIN — SODIUM PHOSPHATE, MONOBASIC, MONOHYDRATE AND SODIUM PHOSPHATE, DIBASIC, ANHYDROUS 30 MMOL: 276; 142 INJECTION, SOLUTION INTRAVENOUS at 10:10

## 2023-10-23 RX ADMIN — INSULIN HUMAN 20 UNITS: 100 INJECTION, SOLUTION PARENTERAL at 08:10

## 2023-10-23 RX ADMIN — IPRATROPIUM BROMIDE 0.5 MG: 0.5 SOLUTION RESPIRATORY (INHALATION) at 08:10

## 2023-10-23 RX ADMIN — SODIUM BICARBONATE 50 MEQ: 84 INJECTION, SOLUTION INTRAVENOUS at 08:10

## 2023-10-23 RX ADMIN — AMIODARONE HYDROCHLORIDE 0.5 MG/MIN: 1.8 INJECTION, SOLUTION INTRAVENOUS at 10:10

## 2023-10-23 RX ADMIN — AMIODARONE HYDROCHLORIDE 1 MG/MIN: 1.8 INJECTION, SOLUTION INTRAVENOUS at 04:10

## 2023-10-23 RX ADMIN — FUROSEMIDE 40 MG: 10 INJECTION, SOLUTION INTRAMUSCULAR; INTRAVENOUS at 03:10

## 2023-10-23 RX ADMIN — HYDROCORTISONE SODIUM SUCCINATE 100 MG: 100 INJECTION, POWDER, FOR SOLUTION INTRAMUSCULAR; INTRAVENOUS at 10:10

## 2023-10-23 RX ADMIN — SODIUM BICARBONATE 50 MEQ: 84 INJECTION, SOLUTION INTRAVENOUS at 07:10

## 2023-10-23 RX ADMIN — POTASSIUM CHLORIDE 10 MEQ: 7.46 INJECTION, SOLUTION INTRAVENOUS at 04:10

## 2023-10-23 RX ADMIN — SODIUM BICARBONATE 100 MEQ: 84 INJECTION, SOLUTION INTRAVENOUS at 07:10

## 2023-10-23 RX ADMIN — LEVALBUTEROL HYDROCHLORIDE 1.25 MG: 1.25 SOLUTION RESPIRATORY (INHALATION) at 11:10

## 2023-10-23 RX ADMIN — CALCIUM GLUCONATE 1 G: 20 INJECTION, SOLUTION INTRAVENOUS at 04:10

## 2023-10-23 RX ADMIN — SODIUM BICARBONATE 50 MEQ: 84 INJECTION, SOLUTION INTRAVENOUS at 10:10

## 2023-10-23 RX ADMIN — SODIUM CHLORIDE, SODIUM LACTATE, POTASSIUM CHLORIDE, AND CALCIUM CHLORIDE 1000 ML: .6; .31; .03; .02 INJECTION, SOLUTION INTRAVENOUS at 02:10

## 2023-10-23 RX ADMIN — HEPARIN SODIUM 5000 UNITS: 5000 INJECTION, SOLUTION INTRAVENOUS; SUBCUTANEOUS at 01:10

## 2023-10-23 RX ADMIN — PIPERACILLIN AND TAZOBACTAM 3.38 G: 3; .375 INJECTION, POWDER, LYOPHILIZED, FOR SOLUTION INTRAVENOUS; PARENTERAL at 08:10

## 2023-10-23 RX ADMIN — IPRATROPIUM BROMIDE 0.5 MG: 0.5 SOLUTION RESPIRATORY (INHALATION) at 11:10

## 2023-10-23 RX ADMIN — IOHEXOL 100 ML: 350 INJECTION, SOLUTION INTRAVENOUS at 02:10

## 2023-10-23 RX ADMIN — DEXTROSE 1.4 MCG/KG/MIN: 5 SOLUTION INTRAVENOUS at 04:10

## 2023-10-23 RX ADMIN — PROPOFOL 20 MCG/KG/MIN: 10 INJECTION, EMULSION INTRAVENOUS at 04:10

## 2023-10-23 RX ADMIN — POTASSIUM CHLORIDE 10 MEQ: 7.46 INJECTION, SOLUTION INTRAVENOUS at 06:10

## 2023-10-23 RX ADMIN — HEPARIN SODIUM 5000 UNITS: 5000 INJECTION INTRAVENOUS; SUBCUTANEOUS at 10:10

## 2023-10-23 RX ADMIN — HYDROCORTISONE SODIUM SUCCINATE 300 MG: 100 INJECTION, POWDER, FOR SOLUTION INTRAMUSCULAR; INTRAVENOUS at 03:10

## 2023-10-23 RX ADMIN — Medication 0.1 MCG/KG/MIN: at 01:10

## 2023-10-23 RX ADMIN — INSULIN HUMAN 20 UNITS: 100 INJECTION, SOLUTION PARENTERAL at 04:10

## 2023-10-23 RX ADMIN — ALBUMIN (HUMAN) 25 G: 12.5 SOLUTION INTRAVENOUS at 07:10

## 2023-10-23 RX ADMIN — NOREPINEPHRINE BITARTRATE 0.5 MCG/KG/MIN: 1 INJECTION, SOLUTION, CONCENTRATE INTRAVENOUS at 11:10

## 2023-10-23 RX ADMIN — SODIUM CHLORIDE: 0.45 INJECTION, SOLUTION INTRAVENOUS at 03:10

## 2023-10-23 RX ADMIN — MUPIROCIN 1 G: 20 OINTMENT TOPICAL at 08:10

## 2023-10-23 RX ADMIN — PANTOPRAZOLE SODIUM 40 MG: 40 INJECTION, POWDER, LYOPHILIZED, FOR SOLUTION INTRAVENOUS at 04:10

## 2023-10-23 RX ADMIN — EPINEPHRINE 1 MG: 0.1 INJECTION, SOLUTION ENDOTRACHEAL; INTRACARDIAC; INTRAVENOUS at 12:10

## 2023-10-23 RX ADMIN — MAGNESIUM SULFATE IN WATER 2 G: 40 INJECTION, SOLUTION INTRAVENOUS at 05:10

## 2023-10-23 RX ADMIN — IPRATROPIUM BROMIDE 0.5 MG: 0.5 SOLUTION RESPIRATORY (INHALATION) at 03:10

## 2023-10-23 RX ADMIN — PIPERACILLIN SODIUM AND TAZOBACTAM SODIUM 3.38 G: 3; .375 INJECTION, POWDER, LYOPHILIZED, FOR SOLUTION INTRAVENOUS at 05:10

## 2023-10-23 RX ADMIN — SODIUM CHLORIDE, SODIUM LACTATE, POTASSIUM CHLORIDE, AND CALCIUM CHLORIDE 1000 ML: .6; .31; .03; .02 INJECTION, SOLUTION INTRAVENOUS at 05:10

## 2023-10-23 RX ADMIN — DEXTROSE: 5 SOLUTION INTRAVENOUS at 02:10

## 2023-10-23 RX ADMIN — SODIUM BICARBONATE 50 MEQ: 84 INJECTION, SOLUTION INTRAVENOUS at 11:10

## 2023-10-23 RX ADMIN — BUMETANIDE 1 MG: 0.25 INJECTION INTRAMUSCULAR; INTRAVENOUS at 11:10

## 2023-10-23 RX ADMIN — POTASSIUM CHLORIDE 10 MEQ: 7.46 INJECTION, SOLUTION INTRAVENOUS at 07:10

## 2023-10-23 RX ADMIN — NOREPINEPHRINE BITARTRATE 0.1 MCG/KG/MIN: 4 INJECTION, SOLUTION INTRAVENOUS at 01:10

## 2023-10-23 RX ADMIN — SODIUM CHLORIDE 2000 ML: 0.9 INJECTION, SOLUTION INTRAVENOUS at 11:10

## 2023-10-23 RX ADMIN — NOREPINEPHRINE BITARTRATE 1.6 MCG/KG/MIN: 1 INJECTION, SOLUTION, CONCENTRATE INTRAVENOUS at 01:10

## 2023-10-23 RX ADMIN — AMIODARONE HYDROCHLORIDE 0.5 MG/MIN: 1.8 INJECTION, SOLUTION INTRAVENOUS at 09:10

## 2023-10-23 NOTE — ED PROVIDER NOTES
Encounter Date: 10/23/2023       History     Chief Complaint   Patient presents with    Cardiac Arrest     Cpr at 2357, family states pt was found blue, 911 initiated. Ems reports vfib, defibrillated a total of 4 times at 360j, 9 doses of 1mg epi, 2 mg narcan, 450 mg total ami administered     Chief complaint is cardiopulmonary arrest.  This 63-year-old male according to the wife was sitting in his recliner listening to music.  She then took a 20-25 minutes shower returned to find him unresponsive and in cardiac arrest.  She did what was appropriate doing mouth breathing and then CPR per EMS.  Ambulance picked up the patient the patient was found to be in VFib.  The patient would go into VFib and PA was shocked 4 times.  Had multiple episodes of being given epinephrine and arrived to the ER at [facility] with the Asif device in place.  EN route he was given amiodarone as well.  The patient here responded to epinephrine with obtaining a pulse and blood pressure.  On arrival he was intubated and the oral airway was replaced by an ET tube on 1st pass without difficulty by me.  Good breath sounds thereafter.  Good capnometer.  The patient initially had an EKG which showed possible third-degree block but with Levophed administration because of bradycardia the patient developed a sinus tachycardia rhythm.  Dr. Niño was called he recommended no acute interventions at this point time.        Review of patient's allergies indicates:   Allergen Reactions    Lisinopril Other (See Comments)     cough     Past Medical History:   Diagnosis Date    Asthma     Hyperlipidemia     Hypertension      Past Surgical History:   Procedure Laterality Date    COLONOSCOPY N/A 7/21/2016    Procedure: COLONOSCOPY;  Surgeon: Jaden Vila MD;  Location: [facility];  Service: Endoscopy;  Laterality: N/A;     Family History   Problem Relation Age of Onset    Hypertension Mother     Heart disease Father     Diabetes Brother          adult onset    Birth defects Maternal Aunt         colon    Lupus Neg Hx     Eczema Neg Hx     Melanoma Neg Hx     Psoriasis Neg Hx      Social History     Tobacco Use    Smoking status: Never    Smokeless tobacco: Never   Substance Use Topics    Alcohol use: No    Drug use: No     Review of Systems   Unable to perform ROS: Acuity of condition       Physical Exam     Initial Vitals [10/23/23 0100]   BP Pulse Resp Temp SpO2   (!) 144/71 97 (!) 71 -- (!) 76 %      MAP       --         Physical Exam    Constitutional: He appears well-developed and well-nourished.   HENT:   Nose: Nose normal.   Eyes: Pupils are equal, round, and reactive to light.   Neck:   Normal range of motion.  Cardiovascular:            Initially patient arrived in PEA then developed a bradycardic rhythm followed by tachycardia.   Pulmonary/Chest: Breath sounds normal.   With intubation   Abdominal: Abdomen is soft.   Musculoskeletal:      Cervical back: Normal range of motion.     Neurological:   Patient unresponsive   Skin:   Skin pink   Psychiatric:   Nonverbal         ED Course   Intubation    Date/Time: 10/23/2023 3:50 AM  Location procedure was performed: [facility] EMERGENCY DEPARTMENT    Performed by: Mahamed Finney MD  Authorized by: Mahamed Finney MD  Comments: Patient intubated with 8 O2 without difficulty 1st past.  Good capnometry noted after placement of the tube        Labs Reviewed   CBC W/ AUTO DIFFERENTIAL - Abnormal; Notable for the following components:       Result Value    WBC 14.03 (*)     RBC 4.51 (*)      (*)     MCH 31.9 (*)     All other components within normal limits   COMPREHENSIVE METABOLIC PANEL - Abnormal; Notable for the following components:    Potassium 3.4 (*)     CO2 19 (*)     Glucose 402 (*)     BUN 24 (*)     Creatinine 1.5 (*)     Calcium 8.2 (*)     AST 82 (*)     ALT 72 (*)     eGFR 52.3 (*)     Anion Gap 17 (*)     All other components within normal limits   B-TYPE NATRIURETIC PEPTIDE -  Abnormal; Notable for the following components:     (*)     All other components within normal limits   TROPONIN I HIGH SENSITIVITY - Abnormal; Notable for the following components:    Troponin I High Sensitivity 1073.0 (*)     All other components within normal limits   LACTIC ACID, PLASMA - Abnormal; Notable for the following components:    Lactate (Lactic Acid) 8.6 (*)     All other components within normal limits   POCT GLUCOSE - Abnormal; Notable for the following components:    POC Glucose 375 (*)     All other components within normal limits   ISTAT PROCEDURE - Abnormal; Notable for the following components:    POC PH 6.929 (*)     POC PCO2 112.3 (*)     POC PO2 33 (*)     POC HCO3 23.5 (*)     POC BE -9 (*)     POC Glucose 409 (*)     POC Potassium 3.0 (*)     All other components within normal limits   ISTAT PROCEDURE - Abnormal; Notable for the following components:    POC PH 7.170 (*)     POC PO2 500 (*)     POC HCO3 15.9 (*)     POC BE -13 (*)     POC Glucose 496 (*)     POC Sodium 135 (*)     POC TCO2 17 (*)     POC Ionized Calcium 0.99 (*)     All other components within normal limits   POCT GLUCOSE - Abnormal; Notable for the following components:    POC Glucose 373 (*)     All other components within normal limits   CULTURE, RESPIRATORY   CULTURE, BLOOD    Narrative:     Collection has been rescheduled by CHIARA at 10/23/2023 02:19 Reason:   Patient unavailable in ct  Collection has been rescheduled by ZMICHAEL at 10/23/2023 02:19 Reason:   Patient unavailable in ct   CULTURE, BLOOD    Narrative:     Collection has been rescheduled by ZMICHAEL at 10/23/2023 02:19 Reason:   Patient unavailable in ct  Collection has been rescheduled by ZMICHAEL at 10/23/2023 02:19 Reason:   Patient unavailable in ct   PROTIME-INR   MAGNESIUM   ALCOHOL,MEDICAL (ETHANOL)   DRUG SCREEN PANEL, URINE EMERGENCY    Narrative:     Specimen Source->Urine   TROPONIN I HIGH SENSITIVITY   URINALYSIS, REFLEX TO URINE CULTURE   URINALYSIS,  REFLEX TO URINE CULTURE   ALCOHOL,MEDICAL (ETHANOL)    Narrative:     Collection has been rescheduled by CHIARA at 10/23/2023 02:19 Reason:   Patient unavailable in ct   MAGNESIUM    Narrative:     Collection has been rescheduled by ZMICHAEL at 10/23/2023 02:19 Reason:   Patient unavailable in ct   URINALYSIS MICROSCOPIC          Imaging Results              X-Ray Abdomen AP 1 View (KUB) (In process)                      CT Abdomen Pelvis With Contrast (Final result)  Result time 10/23/23 03:00:53      Final result by Rosie Issa DO (10/23/23 03:00:53)                   Narrative:    EXAM:  CT Abdomen and Pelvis With Intravenous Contrast    CLINICAL HISTORY:  CARDIAC ARREST. Abnormal stool    TECHNIQUE:  CT images of the abdomen and pelvis with intravenous contrast. Axial, coronal and sagittal images.  This CT exam was performed using one or more of the following dose reduction techniques:  automated exposure control, adjustment of the mA and/or kV according to patient size, and/or use of iterative reconstruction technique.    COMPARISON:  No relevant prior studies available.    FINDINGS:  Artifacts:  Motion artifact somewhat limits evaluation.  Lung bases:  Airspace opacities, probably in the perihilar lower lobes. Mild interstitial prominence.    ABDOMEN:  Liver:  No concerning focal lesion.  Gallbladder and bile ducts:  Contracted gallbladder. No definite calcified gallstones. No significant biliary dilatation.  Pancreas:  No pathologic process.  Spleen:  No pathologic process.  Adrenals:  No pathologic process.  Kidneys and ureters:  No pathologic process.  Stomach and bowel:  Gastric distention.  No significantly dilated loops of bowel, definite bowel wall thickening, or localized inflammation. Fluid is noted in the distal small bowel and throughout the colon.    PELVIS:  Appendix:  No findings to suggest acute appendicitis.  Bladder:  Bladder decompressed by Loya catheter.  Reproductive:  Unremarkable as  visualized.    ABDOMEN and PELVIS:  Intraperitoneal space:  No free air.  No significant free fluid.  Bones/joints:  No acute skeletal abnormality. Degenerative changes.  Soft tissues:  Fat-containing left inguinal hernia without CT evidence of complication.  Vasculature:  Unremarkable.  No abdominal aortic aneurysm.  Lymph nodes:  No pathologically enlarged lymph nodes.  Tubes, lines and devices:  Gastric tube tip in the distal esophagus.    IMPRESSION:  1.  Prominent small bowel and colonic fluid, nonspecific finding which can be seen with enteritis.  2.  Gastric distention. Gastric tube tip in the distal esophagus.  3.  Bilateral airspace opacities, possibly asymmetric edema, pneumonia or aspiration pneumonia.    Electronically signed by:  Rhonda Kebede MD  10/23/2023 03:00 AM CDT Workstation: [id number]                                     CT Head Without Contrast (Final result)  Result time 10/23/23 02:58:00      Final result by Imelda Valencia MD (10/23/23 02:58:00)                   Narrative:    EXAM:  CT Head Without Intravenous Contrast    CLINICAL HISTORY:  The patient is 63 years old and is Male; cardiac arrest    TECHNIQUE:  Axial computed tomography images of the head/brain without intravenous contrast.  Sagittal and coronal reformatted images were created and reviewed.  This CT exam was performed using one or more of the following dose reduction techniques:  automated exposure control, adjustment of the mA and/or kV according to patient size, and/or use of iterative reconstruction technique.    COMPARISON:  No relevant prior studies available.    FINDINGS:  BRAIN:  There is apparent diffuse lack of gray-white matter differentiation. This suggests diffuse cerebral edema. No obvious focal acute infarct. No intracranial hemorrhage visualized. No midline shift. The basilar cisterns are patent.  VENTRICLES: No hydrocephalus.  BONES/JOINTS:  No acute fracture.  SOFT TISSUES:  No significant soft tissue  swelling.  SINUSES:  There is partial opacification of the maxillary and ethmoid sinuses. Small amount of fluid noted in the right maxillary sinus.  MASTOID AIR CELLS:  Unremarkable.  No mastoid effusion.  TUBES, LINES AND DEVICES:  There is a loop within the enteric tube at the level of the pharynx.  Partially visualized endotracheal tube.    IMPRESSION:  1.  Apparent diffuse loss of gray-white matter differentiation, indicating diffuse cerebral edema. This is likely due to global hypoxic/anoxic injury.  2. There is a loop within the enteric tube at the level of the pharynx. Consider repositioning.    Electronically signed by:  Imelda Valencia MD  10/23/2023 02:58 AM CDT Workstation: [id number]                                     X-Ray Chest AP Portable (Final result)  Result time 10/23/23 03:05:58      Final result by Rosie Issa DO (10/23/23 03:05:58)                   Narrative:    EXAM:  XR Chest, 1 View    CLINICAL HISTORY:  cardiac arrest.    TECHNIQUE:  Frontal radiograph of the chest.    COMPARISON:  6/7/2016    FINDINGS:  Lungs:  Bilateral airspace opacities are noted with perihilar predominance. No dense consolidation. Lung volumes are low.  Pleural space:  No sizeable pleural effusion or pneumothorax.  Heart:  Prominent cardiac silhouette, magnified in this projection.  Mediastinum:  Mediastinum unremarkable given low lung volumes, rotation, and supine positioning.  Bones/joints:  No acute osseous abnormality. Degenerative changes.    Tubes, lines and devices:  Gastric tube tip in the distal esophagus.  Endotracheal tube tip 1.4 cm above the yuko.  Overlying monitor leads and wires.  Upper abdomen:  Gastric distention partially visible.    IMPRESSION:  1.  Bilateral pulmonary opacities, possibly pneumonia, aspiration pneumonia or noncardiogenic edema.  2. Gastric tube tip in the distal esophagus.  Suggest advancement by about 12 cm with reassessment.  3.  Endotracheal tube tip 1.4 cm above the  yuko.    Electronically signed by:  Rhonda Kebede MD  10/23/2023 03:05 AM CDT Workstation: [id number]                                     Medications   NORepinephrine 4 mg in dextrose 5% 250 mL infusion (premix) (0 mcg/kg/min × 105.2 kg Intravenous Stopped 10/23/23 0245)   EPINEPHrine 0.1 mg/mL injection (1 mg Intravenous Given 10/23/23 0058)   iohexoL (OMNIPAQUE 350) injection 100 mL (100 mLs Intravenous Given 10/23/23 0242)     Medical Decision Making  Chief complaint is cardiopulmonary arrest.  Etiology is multiple including cardiac etiology lung etiology GI etiology electrolyte etiology drug etiology among others.  In this case the patient was resuscitated did respond with a pulse and blood pressure but CT scan reveals diffuse cerebral edema.  Dr. De La Fuente the pulmonologist recommends no hypothermia protocol.  It looks very ominous for this patient and the patient's family was  notified.  I will then have the hospitalist admit the patient to ICU. Mahamed Finney MD  3:36 AM 10/23/2023          Amount and/or Complexity of Data Reviewed  Labs: ordered.  Radiology: ordered.    Risk  Prescription drug management.              Attending Attestation:         Attending Critical Care:   Critical Care Times:   Direct Patient Care (initial evaluation, reassessments, and time considering the case)................................................................30 minutes.   Ordering, Reviewing, and Interpreting Diagnostic Studies...............................................................................................................15 minutes.   Documentation..................................................................................................................................................................................10 minutes.   Consultation with other Physicians.  .................................................................................................................................................10 minutes.   ==============================================================  Total Critical Care Time - exclusive of procedural time: 65 minutes.  ==============================================================  Critical care was necessary to treat or prevent imminent or life-threatening deterioration of the following conditions: cardiac arrest.   The following critical care procedures were done by me (see procedure notes): pulse oximetry.   Critical care was time spent personally by me on the following activities: examination of patient, obtaining history from patient or relative, ordering and performing treatments and interventions and evaluation of patient's response to treatment.   Critical Care Condition: critical                           Clinical Impression:   Final diagnoses:  [R07.9] Chest pain               FinneyMahamed MD  10/23/23 0356

## 2023-10-23 NOTE — PROGRESS NOTES
The patient's family has decided to honor his wish for organ donation.  Will try to keep the patient alive for a while longer.

## 2023-10-23 NOTE — CONSULTS
Pulmonary/Critical Care Consult      PATIENT NAME: Jaden Greene  MRN: [id number]  TODAY'S DATE: 10/23/2023  8:23 AM  ADMIT DATE: 10/23/2023  AGE: 63 y.o. : 1960    CONSULT REQUESTED BY: Brad Lr MD    REASON FOR CONSULT:   Critical care management    HPI:  The patient is a 63-year-old male who was in his usual state of health.  His wife left the room for approximately 15 minutes to get a shower and when she returned he was unresponsive and blue.  She gave to rescue breaths got him on the floor and started CPR.  When EMS arrived he was in VFib.  He has had multiple cardioversions amiodarone epinephrine.  He had over 1 hour of CPR.  The patient is now severely anoxic.  He has no gray white differentiation on the CT of his head.  He has severe myoclonus.  He has 5 mm pupils which are nonreactive.  He has no response to pain.  Is severely acidotic with a lactic acidosis and a troponin of over 22,000.  I have stopped his echocardiogram as there is no surviving this.  He also has a small left pneumothorax.  I have shut his PEEP off.  Repeat chest x-ray is unchanged.    I have spoken to his brother and wife about the on survive ability of this.  I am trying to get them to decide on spending 3 days of neuro prognostication awaiting further herniation of his brain or whether to withdraw at this time.    REVIEW OF SYSTEMS  Unobtainable    ALLERGIES  Review of patient's allergies indicates:   Allergen Reactions    Lisinopril Other (See Comments)     cough       INPATIENT SCHEDULED MEDICATIONS   chlorhexidine  15 mL Mouth/Throat BID    mupirocin   Nasal BID    pantoprazole  40 mg Intravenous BID    piperacillin-tazobactam (Zosyn) IV (PEDS and ADULTS) (extended infusion is not appropriate)  3.375 g Intravenous Q8H    potassium chloride  10 mEq Intravenous Q1H      amiodarone in dextrose 5% 1 mg/min (10/23/23 0442)    amiodarone in dextrose 5%      propofoL Stopped (10/23/23 8033)       MEDICAL AND SURGICAL  HISTORY  Past Medical History:   Diagnosis Date    Asthma     Hyperlipidemia     Hypertension      Past Surgical History:   Procedure Laterality Date    COLONOSCOPY N/A 7/21/2016    Procedure: COLONOSCOPY;  Surgeon: Jaden Vila MD;  Location: [facility];  Service: Endoscopy;  Laterality: N/A;       ALCOHOL, TOBACCO AND DRUG USE  Social History     Tobacco Use   Smoking Status Never   Smokeless Tobacco Never     Social History     Substance and Sexual Activity   Alcohol Use Yes    Comment: rarely     Social History     Substance and Sexual Activity   Drug Use No       FAMILY HISTORY  Family History   Problem Relation Age of Onset    Hypertension Mother     Heart disease Father     Diabetes Brother         adult onset    Birth defects Maternal Aunt         colon    Lupus Neg Hx     Eczema Neg Hx     Melanoma Neg Hx     Psoriasis Neg Hx        VITAL SIGNS (MOST RECENT)  Temp: 98.1 °F (36.7 °C) (10/23/23 0728)  Pulse: 98 (10/23/23 0806)  Resp: (!) 27 (10/23/23 0806)  BP: (!) 153/93 (10/23/23 0550)  SpO2: 95 % (10/23/23 0806)    INTAKE AND OUTPUT (LAST 24 HOURS):  Intake/Output Summary (Last 24 hours) at 10/23/2023 0823  Last data filed at 10/23/2023 0627  Gross per 24 hour   Intake 415.12 ml   Output 400 ml   Net 15.12 ml       WEIGHT  Wt Readings from Last 1 Encounters:   10/23/23 105.2 kg (231 lb 14.8 oz)       PHYSICAL EXAM  GENERAL: Older patient with continuous myotonic jerking on the ventilator.  HEENT: Pupils are 5 mm and nonreactive.  There are no extraocular movements.  He has dolls  eyes.  Nose intact.  Pharynx intubated with ET tube and OG tube.  NECK: Supple.   HEART: Regular rate and rhythm. No murmur or gallop auscultated.  LUNGS:  Crackles posteriorly.  Lung excursion symmetrical. No change in fremitus.  ABDOMEN: Bowel sounds present. Non-tender, no masses palpated.  : Normal anatomy.  Loya with minimal urine.  EXTREMITIES: Normal muscle tone and joint movement, no cyanosis or clubbing.    LYMPHATICS: No adenopathy palpated, no edema.  SKIN: Dry, intact, no lesions.   NEURO:  Pupils are 5 mm and nonreactive.  There are no extraocular movements.  Patient has dolls eyes.  There is no gag.  There is no corneal reflex.  There is no response to pain.  An apnea test has not been done yet.  PSYCH:  Unable to assess      CBC LAST (LAST 24 HOURS)  Recent Labs   Lab 10/23/23  0106 10/23/23  0107 10/23/23  0635 10/23/23  0729   WBC 14.03*  --  37.91*  --    RBC 4.51*  --  5.20  --    HGB 14.4  --  16.3  --    HCT 45.0   < > 48.9 51   *  --  94  --    MCH 31.9*  --  31.3*  --    MCHC 32.0  --  33.3  --    RDW 12.2  --  12.5  --      --  333  --    MPV 10.0  --  10.3  --    GRAN 41.0  5.8  --  82.0*  --    LYMPH 47.5  6.7*  --  3.0*  --    MONO 3.8*  0.5  --  3.0*  --    BASO 0.12  --   --   --    NRBC 0  --  0  --     < > = values in this interval not displayed.       CHEMISTRY LAST (LAST 24 HOURS)  Recent Labs   Lab 10/23/23  0635 10/23/23  0729   *  --    K 5.9*  --      --    CO2 15*  --    ANIONGAP 16  --    BUN 32*  --    CREATININE 2.2*  --    *  --    CALCIUM 7.3*  --    PH  --  7.199*   MG 2.3  --    ALBUMIN 3.5  --    PROT 6.2  --    ALKPHOS 89  --    *  --    *  --    BILITOT 0.5  --        COAGULATION LAST (LAST 24 HOURS)  Recent Labs   Lab 10/23/23  0635   INR 1.1       CARDIAC PROFILE (LAST 24 HOURS)  Recent Labs   Lab 10/23/23  0106   *   TROPONINIHS 1073.0*       LAST 7 DAYS MICROBIOLOGY   Microbiology Results (last 7 days)       Procedure Component Value Units Date/Time    Culture, Respiratory with Gram Stain [[id number]] Collected: 10/23/23 0118    Order Status: Completed Specimen: Respiratory from Endotracheal Aspirate Updated: 10/23/23 0408     Gram Stain (Respiratory) <10 epithelial cells per low power field.     Gram Stain (Respiratory) Few WBC's     Gram Stain (Respiratory) Rare Gram positive cocci    Stool culture [[id number]]      Order Status: No result Specimen: Stool     Blood culture #2 **CANNOT BE ORDERED STAT** [[id number]] Collected: 10/23/23 0348    Order Status: Sent Specimen: Blood Updated: 10/23/23 0351    Narrative:      Collection has been rescheduled by ZJ1 at 10/23/2023 02:19 Reason:   Patient unavailable in ct  Collection has been rescheduled by ZJ1 at 10/23/2023 02:19 Reason:   Patient unavailable in ct    Blood culture #1 **CANNOT BE ORDERED STAT** [[id number]] Collected: 10/23/23 0317    Order Status: Sent Specimen: Blood Updated: 10/23/23 0317    Narrative:      Collection has been rescheduled by ZJ1 at 10/23/2023 02:19 Reason:   Patient unavailable in ct  Collection has been rescheduled by ZJ1 at 10/23/2023 02:19 Reason:   Patient unavailable in ct            MOST RECENT IMAGING  X-Ray Abdomen AP 1 View (KUB)  XR ABDOMEN 1 VIEW (KUB)    CLINICAL HISTORY:  63 years Male NG tube placement    COMPARISON: CT abdomen October 23, 2023    FINDINGS: Enteric tube is in place, and has been advanced compared to the reference CT, along the expected course of the esophagus, with tip projecting over the gastric body in the left upper quadrant. There is moderate gaseous distention of the stomach. No free intraperitoneal air is evident.    IMPRESSION:    Enteric tube tip within the stomach.    Electronically signed by:  Reyes Munoz MD  10/23/2023 08:09 AM CDT Workstation: [id number]  X-Ray Chest AP Portable  CLINICAL HISTORY:  63 years (1960) Male intubated    TECHNIQUE:  Portable AP radiograph the chest. One view.    COMPARISON:  Radiograph from 1:00 AM    FINDINGS:  Mildly increased central hilar interstitial opacities are seen bilaterally suggestive of either mild edema, atypical infection/pneumonia or bronchitis in the appropriate clinical setting. No consolidative pneumonia is seen.  There is a trace left apical pneumothorax. The heart is normal in size.  Osseous structures show degenerative changes in the spine. The  visualized upper abdomen is unremarkable.    Lines and tubes: Endotracheal tube with tip projecting 7 cm from the yuko. Enteric tube with tip below the field of view (subdiaphragmatic).    IMPRESSION:  1. Trace left apical pneumothorax, new from the previous exam.  2. Improved lung volumes on this follow-up exam.  2. Mild diffuse central hilar predominance interstitial opacities suggesting edema.  3. Interval repositioning of the lines and tubes as above.    RESULT NOTIFICATION: These observations were called to the ER RN/MD at 10/23/2023 7:49 AM CDT who declined results (KATHY)    RESULT NOTIFICATION: These observations were discussed by the dictating physician, by phone and acknowledged by SRAVANI RN at the bedside with KAM PINEDA at 10/23/2023 7:55 AM CDT    Electronically signed by:  Ron Sadler MD  10/23/2023 07:57 AM CDT Workstation: [id number]  CT Head Without Contrast   ADDENDUM #1     THIS REPORT CONTAINS FINDINGS THAT MAY BE CRITICAL TO PATIENT CARE: Impression discussed with Dr. JUD SAWIN  by Dr. Imelda Valencia on 10/23/2023 3:07 AM CDT .  The results were acknowledged and understood.    Electronically signed by:  Imelda Valencia MD  10/23/2023 03:53 AM CDT Workstation: [id number]     ORIGINAL REPORT     EXAM:  CT Head Without Intravenous Contrast    CLINICAL HISTORY:  The patient is 63 years old and is Male; cardiac arrest    TECHNIQUE:  Axial computed tomography images of the head/brain without intravenous contrast.  Sagittal and coronal reformatted images were created and reviewed.  This CT exam was performed using one or more of the following dose reduction techniques:  automated exposure control, adjustment of the mA and/or kV according to patient size, and/or use of iterative reconstruction technique.    COMPARISON:  No relevant prior studies available.    FINDINGS:  BRAIN:  There is apparent diffuse lack of gray-white matter differentiation. This suggests diffuse cerebral edema. No obvious  focal acute infarct. No intracranial hemorrhage visualized. No midline shift. The basilar cisterns are patent.  VENTRICLES: No hydrocephalus.  BONES/JOINTS:  No acute fracture.  SOFT TISSUES:  No significant soft tissue swelling.  SINUSES:  There is partial opacification of the maxillary and ethmoid sinuses. Small amount of fluid noted in the right maxillary sinus.  MASTOID AIR CELLS:  Unremarkable.  No mastoid effusion.  TUBES, LINES AND DEVICES:  There is a loop within the enteric tube at the level of the pharynx.  Partially visualized endotracheal tube.    IMPRESSION:  1.  Apparent diffuse loss of gray-white matter differentiation, indicating diffuse cerebral edema. This is likely due to global hypoxic/anoxic injury.  2. There is a loop within the enteric tube at the level of the pharynx. Consider repositioning.    Electronically signed by:  Imelda Valencia MD  10/23/2023 02:58 AM CDT Workstation: [id number]  X-Ray Chest AP Portable  EXAM:  XR Chest, 1 View    CLINICAL HISTORY:  cardiac arrest.    TECHNIQUE:  Frontal radiograph of the chest.    COMPARISON:  6/7/2016    FINDINGS:  Lungs:  Bilateral airspace opacities are noted with perihilar predominance. No dense consolidation. Lung volumes are low.  Pleural space:  No sizeable pleural effusion or pneumothorax.  Heart:  Prominent cardiac silhouette, magnified in this projection.  Mediastinum:  Mediastinum unremarkable given low lung volumes, rotation, and supine positioning.  Bones/joints:  No acute osseous abnormality. Degenerative changes.    Tubes, lines and devices:  Gastric tube tip in the distal esophagus.  Endotracheal tube tip 1.4 cm above the yuko.  Overlying monitor leads and wires.  Upper abdomen:  Gastric distention partially visible.    IMPRESSION:  1.  Bilateral pulmonary opacities, possibly pneumonia, aspiration pneumonia or noncardiogenic edema.  2. Gastric tube tip in the distal esophagus.  Suggest advancement by about 12 cm with reassessment.  3.   Endotracheal tube tip 1.4 cm above the yuko.    Electronically signed by:  Rhonda Kebede MD  10/23/2023 03:05 AM CDT Workstation: [id number]  CT Abdomen Pelvis With Contrast  EXAM:  CT Abdomen and Pelvis With Intravenous Contrast    CLINICAL HISTORY:  CARDIAC ARREST. Abnormal stool    TECHNIQUE:  CT images of the abdomen and pelvis with intravenous contrast. Axial, coronal and sagittal images.  This CT exam was performed using one or more of the following dose reduction techniques:  automated exposure control, adjustment of the mA and/or kV according to patient size, and/or use of iterative reconstruction technique.    COMPARISON:  No relevant prior studies available.    FINDINGS:  Artifacts:  Motion artifact somewhat limits evaluation.  Lung bases:  Airspace opacities, probably in the perihilar lower lobes. Mild interstitial prominence.    ABDOMEN:  Liver:  No concerning focal lesion.  Gallbladder and bile ducts:  Contracted gallbladder. No definite calcified gallstones. No significant biliary dilatation.  Pancreas:  No pathologic process.  Spleen:  No pathologic process.  Adrenals:  No pathologic process.  Kidneys and ureters:  No pathologic process.  Stomach and bowel:  Gastric distention.  No significantly dilated loops of bowel, definite bowel wall thickening, or localized inflammation. Fluid is noted in the distal small bowel and throughout the colon.    PELVIS:  Appendix:  No findings to suggest acute appendicitis.  Bladder:  Bladder decompressed by Loya catheter.  Reproductive:  Unremarkable as visualized.    ABDOMEN and PELVIS:  Intraperitoneal space:  No free air.  No significant free fluid.  Bones/joints:  No acute skeletal abnormality. Degenerative changes.  Soft tissues:  Fat-containing left inguinal hernia without CT evidence of complication.  Vasculature:  Unremarkable.  No abdominal aortic aneurysm.  Lymph nodes:  No pathologically enlarged lymph nodes.  Tubes, lines and devices:  Gastric  tube tip in the distal esophagus.    IMPRESSION:  1.  Prominent small bowel and colonic fluid, nonspecific finding which can be seen with enteritis.  2.  Gastric distention. Gastric tube tip in the distal esophagus.  3.  Bilateral airspace opacities, possibly asymmetric edema, pneumonia or aspiration pneumonia.    Electronically signed by:  Rhonda Kebede MD  10/23/2023 03:00 AM CDT Workstation: [id number]      CURRENT VISIT EKG  Results for orders placed or performed during the hospital encounter of 10/23/23   EKG 12-lead    Narrative    Test Reason : R07.9,    Vent. Rate : 051 BPM     Atrial Rate : 075 BPM     P-R Int : 000 ms          QRS Dur : 148 ms      QT Int : 460 ms       P-R-T Axes : 064 -14 -07 degrees     QTc Int : 423 ms    Sinus rhythm with A-V dissociation and Wide QRS rhythm  Left bundle branch block  Abnormal ECG  When compared with ECG of 06-JUL-2010 13:43,  Wide QRS rhythm has replaced Sinus rhythm  Vent. rate has decreased BY  29 BPM    Referred By: [email or web address]   SELF           Confirmed By:        ECHOCARDIOGRAM RESULTS  No results found for this or any previous visit.        VENTILATOR INFORMATION  Vent Mode: A/C  Oxygen Concentration (%):  [] 70  Resp Rate Total:  [30 br/min-38 br/min] 36 br/min  Vt Set:  [640 mL] 640 mL  PEEP/CPAP:  [5 cmH20-10 cmH20] 10 cmH20  Pressure Support:  [0 cmH20] 0 cmH20  Mean Airway Pressure:  [14 nnQ49-36 cmH20] 17 cmH20           LAST ARTERIAL BLOOD GAS  ABG  Recent Labs   Lab 10/23/23  0729   PH 7.199*   PO2 93   PCO2 36.0   HCO3 14.0*   BE -14*       IMPRESSION AND PLAN  V fib cardiac arrest  - prolonged downtime, approx 1 hour  Severe anoxic damage  - loss of grey white differentiation  - myoclonus  -  pupils 5mm and fixed  L pneumothorax  - small  - PEEP off  Leukocytosis  - expected  Hyponatremia  Hyperkalemia  ARF  Hyperglycemia  Shock liver  - lactic acidosis  Cardiac injury  - troponin 22,420      Have explained the situation to the family.  His  blood pressure is now dropping despite having the PEEP off.  Explained the multi-system organ failure and the impending or present brain death.  They do not wish to pursue further interventions.  When they are ready we will move to comfort care.  Will continue the propofol to decrease some of the myoclonus which is distressing to the family.    Critical care time spent reviewing the chart, examining the patient, reviewing the labs, reviewing the radiological findings, discussing care with nursing, physicians, and respiratory and creating the note and  has been greater than 35 minutes.          Lynne Doss MD  Date of Service: 10/23/2023  8:23 AM

## 2023-10-23 NOTE — PROCEDURES
[facility]  Date/Time: 10/23/2023 12:19 PM  Location procedure was performed: [facility] ICU 3RD FLOOR  Performed by: Jose Gonzalez, RN  Supervising provider: Gabe Muñoz RN  Assisting provider: Yadira Corona MD  Pre-operative Diagnosis: Cardiac Arrest  Consent Done: Yes  Time out: Immediately prior to procedure a time out was called to verify the correct patient, procedure, equipment, support staff and site/side marked as required  Indications: med administration  Anesthesia: local infiltration  Local anesthetic: lidocaine 1% without epinephrine  Anesthetic Total (mL): 5  Preparation: skin prepped with Betadine and skin prepped with ChloraPrep  Skin prep agent dried: skin prep agent completely dried prior to procedure  Sterile barriers: all five maximum sterile barriers used - cap, mask, sterile gown, sterile gloves, and large sterile sheet  Hand hygiene: hand hygiene performed prior to central venous catheter insertion  Location details: left basilic  Catheter type: triple lumen  Catheter size: 5 Fr  Catheter Length: 48cm    Ultrasound guidance: yes  Vessel Caliber: medium and patent, compressibility normal  Vascular Doppler: not done  Needle advanced into vessel with real time Ultrasound guidance.  Guidewire confirmed in vessel.  Sterile sheath used.  no esophageal manometryNumber of attempts: 1  Post-procedure: blood return through all ports, chlorhexidine patch and sterile dressing applied  Technical procedures used: seldinger technique  Estimated blood loss (mL): 0  Specimens: No  Implants: No  Assessment: placement verified by x-ray and successful placement  Complications: none

## 2023-10-23 NOTE — PLAN OF CARE
Assuming care of 63 year old male with a past medical history of obesity, HTN, HLD, asthma, and MDD/CHRISTIANO who presented with Vfib arrest receiving ACLS protocol for over one hour leading to anoxic brain injury, acute renal failure and acute liver injury. Patient intubated and on vasopressor support. Chest tube placed by Pulmonary for L PTX. Pulmonary and Neurology consulted. Patient now DNR; family updated per Pulmonary. BRANDEE also contacted for potential organ procurement before patient is palliatively extubated.

## 2023-10-23 NOTE — CONSULTS
formerly [facility]  Neurology Consult    Patient Name: Jaden Greene  MRN: [id number]  : 1960  TODAY'S DATE: 10/23/2023  ADMIT DATE: 10/23/2023 12:56 AM                                          CONSULTED PROVIDER: Aminata Powers MD, Neurologist. On-call Phone: [phone]  CONSULT REQUESTED BY: Brad Lr MD     Chief Complaint   Patient presents with    Cardiac Arrest     Cpr at 2357, family states pt was found blue, 911 initiated. Ems reports vfib, defibrillated a total of 4 times at 360j, 9 doses of 1mg epi, 2 mg narcan, 450 mg total ami administered       HPI per EMR:  Mr. Greene is a 63-years-old male who was brought to the ED via EMS with out-of-hospital cardiopulmonary arrest.  Collateral obtained from ED staff and wife.  As per wife patient was experiencing diarrhea over the last 2 days, using Pepto-Bismol, otherwise he was in his usual state of health.  States earlier in the night he was sitting in living room, listening to music, she left to take a shower, for gone for about 20 minutes, when she returned, he was lying with his mouth open, unresponsive and cyanotic.  She states she immediately administered rescue breaths and EMS was called, she placed him on floor and started CPR.  On EMS arrival reported rhythm VFib, defibrillated at least 4 times, received total 450 mg amiodarone, multiple rounds of epinephrine, only brief ROSC with recurrent loss of pulse, in the ED ROSC was subsequently obtained, estimated time of ACLS about 1 hour.  He was intubated in the ED, O2 sats 76% prior.  Hypothermic in the ED, initially hypotensive and required low-dose epinephrine which was subsequently weaned off.  Labs with WBC 14, hemoglobin 14.4, potassium 3.4, glucose 402, BUN/creatinine 24/1.5, , troponin 1073, initial ABG 6.92/112/33/23, repeat 7.17/43/500/16.  Chest x-ray with concern for pulmonary contusion versus asymmetrical pulmonary edema versus pneumonia.  EKG sinus bradycardia  with AV dissociation with wide QRS and left bundle-branch block.  CT head with diffuse cerebral edema.  CT abdomen and pelvis with gastric distention with concern for enteritis.  ED provider stated discussed with on-call Cardiology.  ED provider also discussed with on-call critical care, does not recommend targeted temperature management.  Discussed with wife and family members, reviewed clinical examination and CT head findings, understandably emotional, at this time wife request attempted CPR at least once if recurrent arrest, but states patient would not want to live on life support.  Discussed with ED RN.    Neurology Consult: Patient was seen and examined by me today. He is a 62 yo man with history of HTN, HLD and asthma who presented to the ED with out-of-hospital cardiopulmonary arrest.  HPI as per chart review since patient is unresponsive.  He was in his usual state of health the night of admission, was sitting in the living room listening to music, his wife went to take a shower and was away for about 20 minutes.  When she came to the living room, she found patient unresponsive, cyanotic with his mouth open, so she started CPR after calling EMS.  On EMS arrival patient was in VFib, required at least 4 shocks, multiple rounds of epinephrine, with a total estimated time of ACLS of about 1 hour before return of spontaneous circulation.  His ABG showed acidosis with a pH of 6.92, CO2 of 112, oxygen of 33, so he was intubated and started on mechanical ventilation.  He remained completely unresponsive ever since, CT of the brain was ordered and showed loss of great white matter differentiation consistent with severe cerebral edema, so he was no longer a candidate for therapeutic hypothermia.  He was admitted to the ICU and family were informed about poor prognosis in the setting of prolonged cardiorespiratory arrest and prolonged resuscitation.  Neurology consulted for assistance with brain death  examination.    Review of Systems:    Unable to obtain.    Past Medical History:  has a past medical history of Asthma, Hyperlipidemia, and Hypertension.    Past Surgical History:  has a past surgical history that includes Colonoscopy (N/A, 7/21/2016).    Family Medical History: family history includes Birth defects in his maternal aunt; Diabetes in his brother; Heart disease in his father; Hypertension in his mother.    Social History:  reports that he has never smoked. He has never used smokeless tobacco. He reports current alcohol use. He reports that he does not use drugs.    PCP: Nic Plascencia MD    Allergies:   Review of patient's allergies indicates:   Allergen Reactions    Lisinopril Other (See Comments)     cough       Medications:   No current facility-administered medications on file prior to encounter.     Current Outpatient Medications on File Prior to Encounter   Medication Sig Dispense Refill    amLODIPine (NORVASC) 10 MG tablet Take 1 tablet (10 mg total) by mouth once daily. 90 tablet 3    atorvastatin (LIPITOR) 20 MG tablet TAKE ONE TABLET BY MOUTH ONCE DAILY 90 tablet 3    budesonide (PULMICORT) 0.5 mg/2 mL nebulizer solution Take 2 mLs (0.5 mg total) by nebulization once daily. Controller 100 mL 3    chlorthalidone (HYGROTEN) 25 MG Tab Take 1 tablet (25 mg total) by mouth once daily. 90 tablet 0    FLUoxetine 40 MG capsule Take one capsule by mouth once daily 30 capsule 3    fluticasone furoate-vilanteroL (BREO ELLIPTA) 100-25 mcg/dose diskus inhaler Inhale 1 puff into the lungs once daily. Controller 1 each 11    levalbuterol (XOPENEX) 1.25 mg/3 mL nebulizer solution Take 3 mLs (1.25 mg total) by nebulization every 4 (four) hours as needed for Wheezing. 1 each 3    montelukast (SINGULAIR) 10 mg tablet TAKE ONE TABLET BY MOUTH DAILY IN THE EVENING 90 tablet 3    omeprazole (PRILOSEC) 20 MG capsule Take 1 capsule (20 mg total) by mouth once daily. 90 capsule 4     Scheduled Meds:   chlorhexidine   15 mL Mouth/Throat BID    heparin (porcine)  5,000 Units Subcutaneous Q8H    mupirocin   Nasal BID    pantoprazole  40 mg Intravenous BID    piperacillin-tazobactam (Zosyn) IV (PEDS and ADULTS) (extended infusion is not appropriate)  3.375 g Intravenous Q8H     Continuous Infusions:   amiodarone in dextrose 5% 1 mg/min (10/23/23 0442)    amiodarone in dextrose 5%      propofoL Stopped (10/23/23 0715)     PRN Meds:.calcium gluconate IVPB, calcium gluconate IVPB, calcium gluconate IVPB, dextrose 50%, glucagon (human recombinant), glucose, glucose, insulin aspart U-100, magnesium sulfate IVPB, magnesium sulfate IVPB, naloxone, ondansetron, potassium chloride **AND** potassium chloride **AND** potassium chloride, sodium chloride 0.9%, sodium phosphate 15 mmol in dextrose 5 % (D5W) 250 mL IVPB, sodium phosphate 20.01 mmol in dextrose 5 % (D5W) 250 mL IVPB, sodium phosphate 30 mmol in dextrose 5 % (D5W) 250 mL IVPB      Physical Exam  Current Vitals:  Vitals:    10/23/23 0900   BP:    Pulse: 79   Resp: (!) 23   Temp:      General appearance: intubated, unresponsive  Cardiovascular: tachycardic  Pulmonary: on mechanical ventilation  GI: soft  MSK: no lesions  Skin: no lesions    NEUROLOGICAL EXAM:    Mental status: unresponsive, comatose, not on sedation          Intubated: yes          Sedated: no  Response to noxious stimulation: none  Breathes above ventilator: no  Opens eyes: no  Pupils: fixed and dilated at 6mm, non reactive to light  Eye movements: No pathologic movements such as nystagmus, ocular bobbing, dipping or roving. Negative oculocephalic maneuver.  Corneal reflex: absent  Oculocephalic reflex: absent  Vestibulo-ocular reflex: absent  Cough: absent  Gag: absent  Motor exam: No movement, no myoclonus. No response to pain to sternal rub, supraciliary nerve stimulation, or on any extremities. Intermittent decerebrate posturing was seen.  DTRs: absent throughout. Babinski response absent.     Laboratory Data &  "Studies    Recent Labs   Lab 10/23/23  0106 10/23/23  0635   WBC 14.03* 37.91*   HGB 14.4 16.3    333   * 94       Recent Labs   Lab 10/23/23  0106 10/23/23  0348 10/23/23  0635     --  133*   K 3.4*  --  5.9*     --  102   CO2 19*  --  15*   BUN 24*  --  32*   *  --  463*   CALCIUM 8.2*  --  7.3*   MG  --  1.9 2.3       Recent Labs   Lab 10/23/23  0106 10/23/23  0635   PROT 6.0 6.2   ALBUMIN 3.5 3.5   BILITOT 0.4 0.5   AST 82* 292*   ALT 72* 137*   ALKPHOS 93 89       Recent Labs   Lab 10/23/23  0106 10/23/23  0635   INR 1.0 1.1       No results for input(s): "HGBA1C", "CHOL", "TRIG", "LDLCALC", "HDL", "TSH" in the last 168 hours.      Microbiology:  Microbiology Results (last 7 days)       Procedure Component Value Units Date/Time    Culture, Respiratory with Gram Stain [[id number]] Collected: 10/23/23 0118    Order Status: Completed Specimen: Respiratory from Endotracheal Aspirate Updated: 10/23/23 0408     Gram Stain (Respiratory) <10 epithelial cells per low power field.     Gram Stain (Respiratory) Few WBC's     Gram Stain (Respiratory) Rare Gram positive cocci    Stool culture [[id number]]     Order Status: Canceled Specimen: Stool     Blood culture #2 **CANNOT BE ORDERED STAT** [[id number]] Collected: 10/23/23 0348    Order Status: Sent Specimen: Blood Updated: 10/23/23 0351    Narrative:      Collection has been rescheduled by HCIARA at 10/23/2023 02:19 Reason:   Patient unavailable in ct  Collection has been rescheduled by CHIARA at 10/23/2023 02:19 Reason:   Patient unavailable in ct    Blood culture #1 **CANNOT BE ORDERED STAT** [[id number]] Collected: 10/23/23 0317    Order Status: Sent Specimen: Blood Updated: 10/23/23 0317    Narrative:      Collection has been rescheduled by CHIARA at 10/23/2023 02:19 Reason:   Patient unavailable in ct  Collection has been rescheduled by CHIARA at 10/23/2023 02:19 Reason:   Patient unavailable in ct              Imaging:  X-Ray Chest AP " Portable    Result Date: 10/23/2023  CLINICAL HISTORY: 63 years (1960) Male L pneumo Left apical pneumothorax TECHNIQUE: Portable AP radiograph the chest. One view. COMPARISON: Radiograph from 1:00 AM FINDINGS: Very slight interval worsening, and mild diffuse interstitial opacity in both lungs.  There is a small/trace left apical pneumothorax, with a deep sulcus sign. The heart is top normal in size.  Osseous structures appear unchanged. The visualized upper abdomen is unchanged. Lines and tubes: Endotracheal tube with tip projecting to the level of the clavicular heads. Enteric tube with tip below the field of view (subdiaphragmatic). IMPRESSION: 1. Small left apical pneumothorax, unchanged from the previous exam. 2. Mild diffuse interstitial opacity in both lungs, there is slightly worse from the previous exam. Electronically signed by:  Ron Sadler MD  10/23/2023 08:40 AM CDT Workstation: [id number]    X-Ray Abdomen AP 1 View (KUB)    Result Date: 10/23/2023  XR ABDOMEN 1 VIEW (KUB) CLINICAL HISTORY: 63 years Male NG tube placement COMPARISON: CT abdomen October 23, 2023 FINDINGS: Enteric tube is in place, and has been advanced compared to the reference CT, along the expected course of the esophagus, with tip projecting over the gastric body in the left upper quadrant. There is moderate gaseous distention of the stomach. No free intraperitoneal air is evident. IMPRESSION: Enteric tube tip within the stomach. Electronically signed by:  Reyes Munoz MD  10/23/2023 08:09 AM CDT Workstation: [id number]    X-Ray Chest AP Portable    Result Date: 10/23/2023  CLINICAL HISTORY: 63 years (1960) Male intubated TECHNIQUE: Portable AP radiograph the chest. One view. COMPARISON: Radiograph from 1:00 AM FINDINGS: Mildly increased central hilar interstitial opacities are seen bilaterally suggestive of either mild edema, atypical infection/pneumonia or bronchitis in the appropriate clinical setting. No  consolidative pneumonia is seen.  There is a trace left apical pneumothorax. The heart is normal in size.  Osseous structures show degenerative changes in the spine. The visualized upper abdomen is unremarkable. Lines and tubes: Endotracheal tube with tip projecting 7 cm from the yuko. Enteric tube with tip below the field of view (subdiaphragmatic). IMPRESSION: 1. Trace left apical pneumothorax, new from the previous exam. 2. Improved lung volumes on this follow-up exam. 2. Mild diffuse central hilar predominance interstitial opacities suggesting edema. 3. Interval repositioning of the lines and tubes as above. RESULT NOTIFICATION: These observations were called to the ER RN/MD at 10/23/2023 7:49 AM CDT who declined results (KATHY) RESULT NOTIFICATION: These observations were discussed by the dictating physician, by phone and acknowledged by SRAVANI JOHNSON at the bedside with KAM PINEDA at 10/23/2023 7:55 AM CDT Electronically signed by:  Ron Sadler MD  10/23/2023 07:57 AM CDT Workstation: [id number]    CT Head Without Contrast    Result Date: 10/23/2023   ADDENDUM #1 THIS REPORT CONTAINS FINDINGS THAT MAY BE CRITICAL TO PATIENT CARE: Impression discussed with Dr. JUD SWAIN  by Dr. Imelda Valencia on 10/23/2023 3:07 AM CDT .  The results were acknowledged and understood. Electronically signed by:  Imelda Valencia MD  10/23/2023 03:53 AM CDT Workstation: [id number]  ORIGINAL REPORT EXAM: CT Head Without Intravenous Contrast CLINICAL HISTORY: The patient is 63 years old and is Male; cardiac arrest TECHNIQUE: Axial computed tomography images of the head/brain without intravenous contrast.  Sagittal and coronal reformatted images were created and reviewed.  This CT exam was performed using one or more of the following dose reduction techniques:  automated exposure control, adjustment of the mA and/or kV according to patient size, and/or use of iterative reconstruction technique. COMPARISON: No relevant prior studies  available. FINDINGS: BRAIN:  There is apparent diffuse lack of gray-white matter differentiation. This suggests diffuse cerebral edema. No obvious focal acute infarct. No intracranial hemorrhage visualized. No midline shift. The basilar cisterns are patent. VENTRICLES: No hydrocephalus. BONES/JOINTS:  No acute fracture. SOFT TISSUES:  No significant soft tissue swelling. SINUSES:  There is partial opacification of the maxillary and ethmoid sinuses. Small amount of fluid noted in the right maxillary sinus. MASTOID AIR CELLS:  Unremarkable.  No mastoid effusion. TUBES, LINES AND DEVICES:  There is a loop within the enteric tube at the level of the pharynx.  Partially visualized endotracheal tube. IMPRESSION: 1.  Apparent diffuse loss of gray-white matter differentiation, indicating diffuse cerebral edema. This is likely due to global hypoxic/anoxic injury. 2. There is a loop within the enteric tube at the level of the pharynx. Consider repositioning. Electronically signed by:  Imelda Valencia MD  10/23/2023 02:58 AM CDT Workstation: [id number]    X-Ray Chest AP Portable    Result Date: 10/23/2023  EXAM: XR Chest, 1 View CLINICAL HISTORY: cardiac arrest. TECHNIQUE: Frontal radiograph of the chest. COMPARISON: 6/7/2016 FINDINGS: Lungs:  Bilateral airspace opacities are noted with perihilar predominance. No dense consolidation. Lung volumes are low. Pleural space:  No sizeable pleural effusion or pneumothorax. Heart:  Prominent cardiac silhouette, magnified in this projection. Mediastinum:  Mediastinum unremarkable given low lung volumes, rotation, and supine positioning. Bones/joints:  No acute osseous abnormality. Degenerative changes. Tubes, lines and devices:  Gastric tube tip in the distal esophagus.  Endotracheal tube tip 1.4 cm above the yuko.  Overlying monitor leads and wires. Upper abdomen:  Gastric distention partially visible. IMPRESSION: 1.  Bilateral pulmonary opacities, possibly pneumonia, aspiration  pneumonia or noncardiogenic edema. 2. Gastric tube tip in the distal esophagus.  Suggest advancement by about 12 cm with reassessment. 3.  Endotracheal tube tip 1.4 cm above the yuko. Electronically signed by:  Rhonda Kebede MD  10/23/2023 03:05 AM CDT Workstation: [id number]    CT Abdomen Pelvis With Contrast    Result Date: 10/23/2023  EXAM: CT Abdomen and Pelvis With Intravenous Contrast CLINICAL HISTORY: CARDIAC ARREST. Abnormal stool TECHNIQUE: CT images of the abdomen and pelvis with intravenous contrast. Axial, coronal and sagittal images.  This CT exam was performed using one or more of the following dose reduction techniques:  automated exposure control, adjustment of the mA and/or kV according to patient size, and/or use of iterative reconstruction technique. COMPARISON: No relevant prior studies available. FINDINGS: Artifacts:  Motion artifact somewhat limits evaluation. Lung bases:  Airspace opacities, probably in the perihilar lower lobes. Mild interstitial prominence. ABDOMEN: Liver:  No concerning focal lesion. Gallbladder and bile ducts:  Contracted gallbladder. No definite calcified gallstones. No significant biliary dilatation. Pancreas:  No pathologic process. Spleen:  No pathologic process. Adrenals:  No pathologic process. Kidneys and ureters:  No pathologic process. Stomach and bowel:  Gastric distention.  No significantly dilated loops of bowel, definite bowel wall thickening, or localized inflammation. Fluid is noted in the distal small bowel and throughout the colon. PELVIS: Appendix:  No findings to suggest acute appendicitis. Bladder:  Bladder decompressed by Loya catheter. Reproductive:  Unremarkable as visualized. ABDOMEN and PELVIS: Intraperitoneal space:  No free air.  No significant free fluid. Bones/joints:  No acute skeletal abnormality. Degenerative changes. Soft tissues:  Fat-containing left inguinal hernia without CT evidence of complication. Vasculature:  Unremarkable.   No abdominal aortic aneurysm. Lymph nodes:  No pathologically enlarged lymph nodes. Tubes, lines and devices:  Gastric tube tip in the distal esophagus. IMPRESSION: 1.  Prominent small bowel and colonic fluid, nonspecific finding which can be seen with enteritis. 2.  Gastric distention. Gastric tube tip in the distal esophagus. 3.  Bilateral airspace opacities, possibly asymmetric edema, pneumonia or aspiration pneumonia. Electronically signed by:  Rhonda Kebede MD  10/23/2023 03:00 AM CDT Workstation: [id number]        Assessment and Plan:    Cardiorespiratory arrest with prolonged resuscitation  Severe anoxic brain injury - Brain death   64 yo man with history of HTN, HLD and asthma who presented to the ED with out-of-hospital cardiopulmonary arrest. On EMS arrival patient was in VFib, required at least 4 shocks, multiple rounds of epinephrine, with a total estimated time of ACLS of about 1 hour before return of spontaneous circulation.  His ABG showed acidosis with a pH of 6.92, CO2 of 112, oxygen of 33, so he was intubated and started on mechanical ventilation.  He remained completely unresponsive ever since, CT of the brain was ordered and showed loss of great white matter differentiation consistent with severe cerebral edema, so he was no longer a candidate for therapeutic hypothermia.  Neurological examination showed all brainstem reflexes were absent including respiratory drive, no response to pain, consistent with brain death.    - admitted to the ICU, unresponsive, comatose on mechanical ventilation  - neurological examination is consistent with brain death, supported by imaging finding of diffuse cerebral edema and history of prolonged resuscitation  - Brain death examination performed, see separate note for brain death declaration  - Patient will be transferred to [facility] for organ harvesting for donation.  - Discussed clinical findings with family and provided them with comforting words,  answering all their questions. They verbalized understanding.   Thank you kindly for including us in the care of this patient. Please do not hesitate to contact us with any questions.       Critical Care:  75 minutes of critical care time has been spent evaluating with the patient. Time includes chart review not limited to diagnostic imaging, labs, and vitals, patient assessment, discussion with family and nursing, current order evaluations, and new order entries.      Aminata Powers MD  Neurology

## 2023-10-23 NOTE — PROGRESS NOTES
Patient poorly responsive to Levophed.  Dose being walked up continuously.BRANDEE aware.     pH 7.17.  Will give two more amps of bicarb.

## 2023-10-23 NOTE — SIGNIFICANT EVENT
DECLARATION OF BRAIN DEATH    Patient: Jaden Greene  Date: 10/23/2023  Time of death: 12:00 PM  Physicians declaring brain death: Aminata Powers MD (neurologist) and Brad Lr MD (Hospitalist)    On my exam, patient was intubated, not on sedation or paralytics, normothermic, with no severe acid-base, electrolyte or endocrine abnormality, SBP greater than 100, no spontaneous breaths noted on the ventilator.    Pupils were dilated (6mm), equal and nonreactive to light. Corneal reflexes, oculocephalic reflex, oculovestibular reflexes, cough and gag reflexes were all absent.  Patient had no eye opening, no verbal output and no response to noxious stimuli at the supraorbital regions, TMJ, sternum and all four limbs.     Apnea test was performed with passive oxygen supplementation through O2 cannula on ETT at 15 L/min showing baseline CO2 of 39.6 and raise in CO2 to 63.2 thus meeting criteria for positive apnea test indicative of brain death.    CT head showed evidence of diffuse anoxic injury.    Patient was declared brain dead by myself and Dr. Lr. BRANDEE involved in case for organ harvesting for donation.    Aminata Powers MD  Neurology

## 2023-10-23 NOTE — NURSING
Neurology Dr Sukumar Powers and Dr Brad Lr declared patient Jaden Greene  1960 is brain dead at 1200pm on 10/23/2023.

## 2023-10-23 NOTE — HPI
Mr. Greene is a 63-years-old male who was brought to the ED via EMS with out-of-hospital cardiopulmonary arrest.  Collateral obtained from ED staff and wife.  As per wife patient was experiencing diarrhea over the last 2 days, using Pepto-Bismol, otherwise he was in his usual state of health.  States earlier in the night he was sitting in living room, listening to music, she left to take a shower, for gone for about 20 minutes, when she returned, he was lying with his mouth open, unresponsive and cyanotic.  She states she immediately administered rescue breaths and EMS was called, she placed him on floor and started CPR.  On EMS arrival reported rhythm VFib, defibrillated at least 4 times, received total 450 mg amiodarone, multiple rounds of epinephrine, only brief ROSC with recurrent loss of pulse, in the ED ROSC was subsequently obtained, estimated time of ACLS about 1 hour.  He was intubated in the ED, O2 sats 76% prior.  Hypothermic in the ED, initially hypotensive and required low-dose epinephrine which was subsequently weaned off.  Labs with WBC 14, hemoglobin 14.4, potassium 3.4, glucose 402, BUN/creatinine 24/1.5, , troponin 1073, initial ABG 6.92/112/33/23, repeat 7.17/43/500/16.  Chest x-ray with concern for pulmonary contusion versus asymmetrical pulmonary edema versus pneumonia.  EKG sinus bradycardia with AV dissociation with wide QRS and left bundle-branch block.  CT head with diffuse cerebral edema.  CT abdomen and pelvis with gastric distention with concern for enteritis.  ED provider stated discussed with on-call Cardiology.  ED provider also discussed with on-call critical care, does not recommend targeted temperature management.  Discussed with wife and family members, reviewed clinical examination and CT head findings, understandably emotional, at this time wife request attempted CPR at least once if recurrent arrest, but states patient would not want to live on life support.  Discussed  with ED RN.

## 2023-10-23 NOTE — NURSING
"Notified about patient's death to 's office, verbalizes will call back from on duty investigator".   "

## 2023-10-23 NOTE — PLAN OF CARE
10/23/23 1317   Final Note   Assessment Type Final Discharge Note   Anticipated Discharge Disposition

## 2023-10-23 NOTE — PHARMACY MED REC
"Admission Medication History     The home medication history was taken by Tomer Briggs.    You may go to "Admission" then "Reconcile Home Medications" tabs to review and/or act upon these items.     The home medication list has been updated by the Pharmacy department.   Please read ALL comments highlighted in yellow.   Please address this information as you see fit.    Feel free to contact us if you have any questions or require assistance.      Medications listed below were obtained from: Patient/family and Analytic software- [employer]  No current facility-administered medications on file prior to encounter.     Current Outpatient Medications on File Prior to Encounter   Medication Sig Dispense Refill    budesonide (PULMICORT) 0.5 mg/2 mL nebulizer solution Take 2 mLs (0.5 mg total) by nebulization once daily. Controller 100 mL 3    fluticasone furoate-vilanteroL (BREO ELLIPTA) 100-25 mcg/dose diskus inhaler Inhale 1 puff into the lungs once daily. Controller 1 each 11    levalbuterol (XOPENEX) 1.25 mg/3 mL nebulizer solution Take 3 mLs (1.25 mg total) by nebulization every 4 (four) hours as needed for Wheezing. 1 each 3    amLODIPine (NORVASC) 10 MG tablet Take 1 tablet (10 mg total) by mouth once daily. 90 tablet 3    atorvastatin (LIPITOR) 20 MG tablet TAKE ONE TABLET BY MOUTH ONCE DAILY (Patient taking differently: Take 20 mg by mouth once daily.) 90 tablet 3    chlorthalidone (HYGROTEN) 25 MG Tab Take 1 tablet (25 mg total) by mouth once daily. 90 tablet 0    FLUoxetine 40 MG capsule Take one capsule by mouth once daily (Patient taking differently: Take 40 mg by mouth once daily.) 30 capsule 3    montelukast (SINGULAIR) 10 mg tablet TAKE ONE TABLET BY MOUTH DAILY IN THE EVENING (Patient taking differently: Take 10 mg by mouth every evening.) 90 tablet 3    omeprazole (PRILOSEC) 20 MG capsule Take 1 capsule (20 mg total) by mouth once daily. 90 capsule 4       Potential issues to be addressed PRIOR TO " DISCHARGE  Please discuss with the patient barriers to adherence with medication treatment plans    Tomer Briggs  EXT 1922                 .

## 2023-10-23 NOTE — NURSING
Plans to withdraw care, Dr Doss aware of patient's vitals, no order for BP support, family at bedside, waiting for others to arrive,  called.

## 2023-10-23 NOTE — PROCEDURES
Thoracostomy tube placement    Indication:  Left pneumothorax and falling blood pressure  Medications: None  Specimens: None  Complications: None    The patient was positioned in the right lateral decubitus position.  The left anterior axillary area in the 8th interspace was prepped and draped in the usual sterile fashion.  A 12. Blade was used to incise the skin.  Curved Reyna's were used to dissect down to the pleura.  The pleura was entered with the curved Reyna's.  Finger sweep revealed no adhesions.  A 28 Turkmen thoracostomy tube was advanced to 14 cm.  It was then sutured in place.  Initial air return was brisk but not tension.  This did not relieve his shock.  Chest x-ray has been ordered.

## 2023-10-23 NOTE — NURSING
"[facility] called and notified of GCS score, referral number obtained, waiting for call back from organ coordinator.     [id number] [facility] representative called back and states will have  sent shortly".   "

## 2023-10-23 NOTE — SUBJECTIVE & OBJECTIVE
Past Medical History:   Diagnosis Date    Asthma     Hyperlipidemia     Hypertension        Past Surgical History:   Procedure Laterality Date    COLONOSCOPY N/A 7/21/2016    Procedure: COLONOSCOPY;  Surgeon: Jaden Vila MD;  Location: [facility];  Service: Endoscopy;  Laterality: N/A;       Review of patient's allergies indicates:   Allergen Reactions    Lisinopril Other (See Comments)     cough       No current facility-administered medications on file prior to encounter.     Current Outpatient Medications on File Prior to Encounter   Medication Sig    amLODIPine (NORVASC) 10 MG tablet Take 1 tablet (10 mg total) by mouth once daily.    atorvastatin (LIPITOR) 20 MG tablet TAKE ONE TABLET BY MOUTH ONCE DAILY    budesonide (PULMICORT) 0.5 mg/2 mL nebulizer solution Take 2 mLs (0.5 mg total) by nebulization once daily. Controller    chlorthalidone (HYGROTEN) 25 MG Tab Take 1 tablet (25 mg total) by mouth once daily.    FLUoxetine 40 MG capsule Take one capsule by mouth once daily    fluticasone furoate-vilanteroL (BREO ELLIPTA) 100-25 mcg/dose diskus inhaler Inhale 1 puff into the lungs once daily. Controller    levalbuterol (XOPENEX) 1.25 mg/3 mL nebulizer solution Take 3 mLs (1.25 mg total) by nebulization every 4 (four) hours as needed for Wheezing.    montelukast (SINGULAIR) 10 mg tablet TAKE ONE TABLET BY MOUTH DAILY IN THE EVENING    omeprazole (PRILOSEC) 20 MG capsule Take 1 capsule (20 mg total) by mouth once daily.     Family History       Problem Relation (Age of Onset)    Birth defects Maternal Aunt    Diabetes Brother    Heart disease Father    Hypertension Mother          Tobacco Use    Smoking status: Never    Smokeless tobacco: Never   Substance and Sexual Activity    Alcohol use: Yes     Comment: rarely    Drug use: No    Sexual activity: Not on file     Review of Systems   Unable to perform ROS: Intubated     Objective:     Vital Signs (Most Recent):  Pulse: (!) 112 (10/23/23 0312)  Resp: (!) 34  "(10/23/23 0312)  BP: (!) 177/84 (10/23/23 0312)  SpO2: (!) 92 % (10/23/23 0312) Vital Signs (24h Range):  Pulse:  [] 112  Resp:  [23-71] 34  SpO2:  [74 %-100 %] 92 %  BP: (144-177)/(71-97) 177/84     Weight: 105.2 kg (231 lb 14.8 oz)  Body mass index is 30.6 kg/m².     Physical Exam  Vitals and nursing note reviewed.   Constitutional:       Appearance: He is obese. He is ill-appearing.      Comments: Critically ill-appearing, lying in stretcher   HENT:      Head: Normocephalic and atraumatic.      Mouth/Throat:      Comments: Orally intubated with OG tube in place  Eyes:      Comments: Pupils about 2 mm bilaterally and fixed   Cardiovascular:      Rate and Rhythm: Regular rhythm. Tachycardia present.      Comments: Cold peripheries, no LE edema  Pulmonary:      Comments: Orally intubated on mechanical ventilation FiO2 60 with 10 of PEEP, pink frothy secretions seen in ETT  Abdominal:      Comments: Distended and tympanic, OG tube placed, dark red output seen   Genitourinary:     Comments: Loya in place with light yellow urine draining  Skin:     Comments: Right radial arterial line   Neurological:      Comments: Unresponsive, not withdrawing to painful stimuli all 4 extremities, pupils about 2 mm and fixed, gag reflex present with suctioning   Psychiatric:      Comments: Unable to evaluate                Significant Labs: ABGs:   Recent Labs   Lab 10/23/23  0107 10/23/23  0124   PH 6.929* 7.170*   PCO2 112.3* 43.7   HCO3 23.5* 15.9*   POCSATURATED 30 100   BE -9* -13*   PO2 33* 500*     Bilirubin:   Recent Labs   Lab 10/23/23  0106   BILITOT 0.4     Blood Culture: No results for input(s): "LABBLOO" in the last 48 hours.  BMP:   Recent Labs   Lab 10/23/23  0106   *      K 3.4*      CO2 19*   BUN 24*   CREATININE 1.5*   CALCIUM 8.2*     CBC:   Recent Labs   Lab 10/23/23  0106 10/23/23  0107 10/23/23  0124   WBC 14.03*  --   --    HGB 14.4  --   --    HCT 45.0 45 36     --   --  " "    CMP:   Recent Labs   Lab 10/23/23  0106      K 3.4*      CO2 19*   *   BUN 24*   CREATININE 1.5*   CALCIUM 8.2*   PROT 6.0   ALBUMIN 3.5   BILITOT 0.4   ALKPHOS 93   AST 82*   ALT 72*   ANIONGAP 17*     Cardiac Markers:   Recent Labs   Lab 10/23/23  0106   *     Lactic Acid:   Recent Labs   Lab 10/23/23  0119   LACTATE 8.6*     Magnesium: No results for input(s): "MG" in the last 48 hours.  POCT Glucose: No results for input(s): "POCTGLUCOSE" in the last 48 hours.  Respiratory Culture: No results for input(s): "GSRESP", "RESPIRATORYC" in the last 48 hours.  Troponin:   Recent Labs   Lab 10/23/23  0106   TROPONINIHS 1073.0*     TSH: No results for input(s): "TSH" in the last 4320 hours.  Urine Culture: No results for input(s): "LABURIN" in the last 48 hours.  Urine Studies:   Recent Labs   Lab 10/23/23  0222   COLORU Yellow   APPEARANCEUA Clear   PHUR 7.0   SPECGRAV 1.020   PROTEINUA Negative   GLUCUA 4+*   KETONESU Negative   BILIRUBINUA Negative   OCCULTUA Negative   NITRITE Negative   UROBILINOGEN Negative   LEUKOCYTESUR Negative   RBCUA 2   WBCUA 0   BACTERIA Negative   SQUAMEPITHEL 0   HYALINECASTS 0.00*       Significant Imaging: I have reviewed all pertinent imaging results/findings within the past 24 hours.    CT Head Without Contrast    Result Date: 10/23/2023   ADDENDUM #1 THIS REPORT CONTAINS FINDINGS THAT MAY BE CRITICAL TO PATIENT CARE: Impression discussed with Dr. JUD SWAIN  by Dr. Imelda Valencia on 10/23/2023 3:07 AM CDT .  The results were acknowledged and understood. Electronically signed by:  Imelda Valencia MD  10/23/2023 03:53 AM CDT Workstation: [id number]  ORIGINAL REPORT EXAM: CT Head Without Intravenous Contrast CLINICAL HISTORY: The patient is 63 years old and is Male; cardiac arrest TECHNIQUE: Axial computed tomography images of the head/brain without intravenous contrast.  Sagittal and coronal reformatted images were created and reviewed.  This CT exam was " performed using one or more of the following dose reduction techniques:  automated exposure control, adjustment of the mA and/or kV according to patient size, and/or use of iterative reconstruction technique. COMPARISON: No relevant prior studies available. FINDINGS: BRAIN:  There is apparent diffuse lack of gray-white matter differentiation. This suggests diffuse cerebral edema. No obvious focal acute infarct. No intracranial hemorrhage visualized. No midline shift. The basilar cisterns are patent. VENTRICLES: No hydrocephalus. BONES/JOINTS:  No acute fracture. SOFT TISSUES:  No significant soft tissue swelling. SINUSES:  There is partial opacification of the maxillary and ethmoid sinuses. Small amount of fluid noted in the right maxillary sinus. MASTOID AIR CELLS:  Unremarkable.  No mastoid effusion. TUBES, LINES AND DEVICES:  There is a loop within the enteric tube at the level of the pharynx.  Partially visualized endotracheal tube. IMPRESSION: 1.  Apparent diffuse loss of gray-white matter differentiation, indicating diffuse cerebral edema. This is likely due to global hypoxic/anoxic injury. 2. There is a loop within the enteric tube at the level of the pharynx. Consider repositioning. Electronically signed by:  Imelda Valencia MD  10/23/2023 02:58 AM CDT Workstation: [id number]    X-Ray Chest AP Portable    Result Date: 10/23/2023  EXAM: XR Chest, 1 View CLINICAL HISTORY: cardiac arrest. TECHNIQUE: Frontal radiograph of the chest. COMPARISON: 6/7/2016 FINDINGS: Lungs:  Bilateral airspace opacities are noted with perihilar predominance. No dense consolidation. Lung volumes are low. Pleural space:  No sizeable pleural effusion or pneumothorax. Heart:  Prominent cardiac silhouette, magnified in this projection. Mediastinum:  Mediastinum unremarkable given low lung volumes, rotation, and supine positioning. Bones/joints:  No acute osseous abnormality. Degenerative changes. Tubes, lines and devices:  Gastric tube tip  in the distal esophagus.  Endotracheal tube tip 1.4 cm above the yuko.  Overlying monitor leads and wires. Upper abdomen:  Gastric distention partially visible. IMPRESSION: 1.  Bilateral pulmonary opacities, possibly pneumonia, aspiration pneumonia or noncardiogenic edema. 2. Gastric tube tip in the distal esophagus.  Suggest advancement by about 12 cm with reassessment. 3.  Endotracheal tube tip 1.4 cm above the yuko. Electronically signed by:  Rhonda Kebede MD  10/23/2023 03:05 AM CDT Workstation: [id number]    CT Abdomen Pelvis With Contrast    Result Date: 10/23/2023  EXAM: CT Abdomen and Pelvis With Intravenous Contrast CLINICAL HISTORY: CARDIAC ARREST. Abnormal stool TECHNIQUE: CT images of the abdomen and pelvis with intravenous contrast. Axial, coronal and sagittal images.  This CT exam was performed using one or more of the following dose reduction techniques:  automated exposure control, adjustment of the mA and/or kV according to patient size, and/or use of iterative reconstruction technique. COMPARISON: No relevant prior studies available. FINDINGS: Artifacts:  Motion artifact somewhat limits evaluation. Lung bases:  Airspace opacities, probably in the perihilar lower lobes. Mild interstitial prominence. ABDOMEN: Liver:  No concerning focal lesion. Gallbladder and bile ducts:  Contracted gallbladder. No definite calcified gallstones. No significant biliary dilatation. Pancreas:  No pathologic process. Spleen:  No pathologic process. Adrenals:  No pathologic process. Kidneys and ureters:  No pathologic process. Stomach and bowel:  Gastric distention.  No significantly dilated loops of bowel, definite bowel wall thickening, or localized inflammation. Fluid is noted in the distal small bowel and throughout the colon. PELVIS: Appendix:  No findings to suggest acute appendicitis. Bladder:  Bladder decompressed by Loya catheter. Reproductive:  Unremarkable as visualized. ABDOMEN and PELVIS:  Intraperitoneal space:  No free air.  No significant free fluid. Bones/joints:  No acute skeletal abnormality. Degenerative changes. Soft tissues:  Fat-containing left inguinal hernia without CT evidence of complication. Vasculature:  Unremarkable.  No abdominal aortic aneurysm. Lymph nodes:  No pathologically enlarged lymph nodes. Tubes, lines and devices:  Gastric tube tip in the distal esophagus. IMPRESSION: 1.  Prominent small bowel and colonic fluid, nonspecific finding which can be seen with enteritis. 2.  Gastric distention. Gastric tube tip in the distal esophagus. 3.  Bilateral airspace opacities, possibly asymmetric edema, pneumonia or aspiration pneumonia. Electronically signed by:  Rhonda Kebede MD  10/23/2023 03:00 AM CDT Workstation: [id number]

## 2023-10-23 NOTE — H&P
[facility] - Emergency Dept  Hospital Medicine  History & Physical    Patient Name: Jaden Greene  MRN: [id number]  Patient Class: IP- Inpatient  Admission Date: 10/23/2023  Attending Physician: Yadira Corona MD   Primary Care Provider: Nic Plascencia MD         Patient information was obtained from spouse/SO, past medical records and ER records.     Subjective:     Principal Problem:Cardiac arrest    Chief Complaint:   Chief Complaint   Patient presents with    Cardiac Arrest     Cpr at 2357, family states pt was found blue, 911 initiated. Ems reports vfib, defibrillated a total of 4 times at 360j, 9 doses of 1mg epi, 2 mg narcan, 450 mg total ami administered        HPI: Mr. Greene is a 63-years-old male who was brought to the ED via EMS with out-of-hospital cardiopulmonary arrest.  Collateral obtained from ED staff and wife.  As per wife patient was experiencing diarrhea over the last 2 days, using Pepto-Bismol, otherwise he was in his usual state of health.  States earlier in the night he was sitting in living room, listening to music, she left to take a shower, for gone for about 20 minutes, when she returned, he was lying with his mouth open, unresponsive and cyanotic.  She states she immediately administered rescue breaths and EMS was called, she placed him on floor and started CPR.  On EMS arrival reported rhythm VFib, defibrillated at least 4 times, received total 450 mg amiodarone, multiple rounds of epinephrine, only brief ROSC with recurrent loss of pulse, in the ED ROSC was subsequently obtained, estimated time of ACLS about 1 hour.  He was intubated in the ED, O2 sats 76% prior.  Hypothermic in the ED, initially hypotensive and required low-dose epinephrine which was subsequently weaned off.  Labs with WBC 14, hemoglobin 14.4, potassium 3.4, glucose 402, BUN/creatinine 24/1.5, , troponin 1073, initial ABG 6.92/112/33/23, repeat 7.17/43/500/16.  Chest x-ray with concern for  pulmonary contusion versus asymmetrical pulmonary edema versus pneumonia.  EKG sinus bradycardia with AV dissociation with wide QRS and left bundle-branch block.  CT head with diffuse cerebral edema.  CT abdomen and pelvis with gastric distention with concern for enteritis.  ED provider stated discussed with on-call Cardiology.  ED provider also discussed with on-call critical care, does not recommend targeted temperature management.  Discussed with wife and family members, reviewed clinical examination and CT head findings, understandably emotional, at this time wife request attempted CPR at least once if recurrent arrest, but states patient would not want to live on life support.  Discussed with ED RN.      Past Medical History:   Diagnosis Date    Asthma     Hyperlipidemia     Hypertension        Past Surgical History:   Procedure Laterality Date    COLONOSCOPY N/A 7/21/2016    Procedure: COLONOSCOPY;  Surgeon: Jaden Vila MD;  Location: [facility];  Service: Endoscopy;  Laterality: N/A;       Review of patient's allergies indicates:   Allergen Reactions    Lisinopril Other (See Comments)     cough       No current facility-administered medications on file prior to encounter.     Current Outpatient Medications on File Prior to Encounter   Medication Sig    amLODIPine (NORVASC) 10 MG tablet Take 1 tablet (10 mg total) by mouth once daily.    atorvastatin (LIPITOR) 20 MG tablet TAKE ONE TABLET BY MOUTH ONCE DAILY    budesonide (PULMICORT) 0.5 mg/2 mL nebulizer solution Take 2 mLs (0.5 mg total) by nebulization once daily. Controller    chlorthalidone (HYGROTEN) 25 MG Tab Take 1 tablet (25 mg total) by mouth once daily.    FLUoxetine 40 MG capsule Take one capsule by mouth once daily    fluticasone furoate-vilanteroL (BREO ELLIPTA) 100-25 mcg/dose diskus inhaler Inhale 1 puff into the lungs once daily. Controller    levalbuterol (XOPENEX) 1.25 mg/3 mL nebulizer solution Take 3 mLs (1.25 mg total) by  nebulization every 4 (four) hours as needed for Wheezing.    montelukast (SINGULAIR) 10 mg tablet TAKE ONE TABLET BY MOUTH DAILY IN THE EVENING    omeprazole (PRILOSEC) 20 MG capsule Take 1 capsule (20 mg total) by mouth once daily.     Family History       Problem Relation (Age of Onset)    Birth defects Maternal Aunt    Diabetes Brother    Heart disease Father    Hypertension Mother          Tobacco Use    Smoking status: Never    Smokeless tobacco: Never   Substance and Sexual Activity    Alcohol use: Yes     Comment: rarely    Drug use: No    Sexual activity: Not on file     Review of Systems   Unable to perform ROS: Intubated     Objective:     Vital Signs (Most Recent):  Pulse: (!) 112 (10/23/23 0312)  Resp: (!) 34 (10/23/23 0312)  BP: (!) 177/84 (10/23/23 0312)  SpO2: (!) 92 % (10/23/23 0312) Vital Signs (24h Range):  Pulse:  [] 112  Resp:  [23-71] 34  SpO2:  [74 %-100 %] 92 %  BP: (144-177)/(71-97) 177/84     Weight: 105.2 kg (231 lb 14.8 oz)  Body mass index is 30.6 kg/m².     Physical Exam  Vitals and nursing note reviewed.   Constitutional:       Appearance: He is obese. He is ill-appearing.      Comments: Critically ill-appearing, lying in stretcher   HENT:      Head: Normocephalic and atraumatic.      Mouth/Throat:      Comments: Orally intubated with OG tube in place  Eyes:      Comments: Pupils about 2 mm bilaterally and fixed   Cardiovascular:      Rate and Rhythm: Regular rhythm. Tachycardia present.      Comments: Cold peripheries, no LE edema  Pulmonary:      Comments: Orally intubated on mechanical ventilation FiO2 60 with 10 of PEEP, pink frothy secretions seen in ETT  Abdominal:      Comments: Distended and tympanic, OG tube placed, dark red output seen   Genitourinary:     Comments: Loya in place with light yellow urine draining  Skin:     Comments: Right radial arterial line   Neurological:      Comments: Unresponsive, not withdrawing to painful stimuli all 4 extremities, pupils  "about 2 mm and fixed, gag reflex present with suctioning   Psychiatric:      Comments: Unable to evaluate                Significant Labs: ABGs:   Recent Labs   Lab 10/23/23  0107 10/23/23  0124   PH 6.929* 7.170*   PCO2 112.3* 43.7   HCO3 23.5* 15.9*   POCSATURATED 30 100   BE -9* -13*   PO2 33* 500*     Bilirubin:   Recent Labs   Lab 10/23/23  0106   BILITOT 0.4     Blood Culture: No results for input(s): "LABBLOO" in the last 48 hours.  BMP:   Recent Labs   Lab 10/23/23  0106   *      K 3.4*      CO2 19*   BUN 24*   CREATININE 1.5*   CALCIUM 8.2*     CBC:   Recent Labs   Lab 10/23/23  0106 10/23/23  0107 10/23/23  0124   WBC 14.03*  --   --    HGB 14.4  --   --    HCT 45.0 45 36     --   --      CMP:   Recent Labs   Lab 10/23/23  0106      K 3.4*      CO2 19*   *   BUN 24*   CREATININE 1.5*   CALCIUM 8.2*   PROT 6.0   ALBUMIN 3.5   BILITOT 0.4   ALKPHOS 93   AST 82*   ALT 72*   ANIONGAP 17*     Cardiac Markers:   Recent Labs   Lab 10/23/23  0106   *     Lactic Acid:   Recent Labs   Lab 10/23/23  0119   LACTATE 8.6*     Magnesium: No results for input(s): "MG" in the last 48 hours.  POCT Glucose: No results for input(s): "POCTGLUCOSE" in the last 48 hours.  Respiratory Culture: No results for input(s): "GSRESP", "RESPIRATORYC" in the last 48 hours.  Troponin:   Recent Labs   Lab 10/23/23  0106   TROPONINIHS 1073.0*     TSH: No results for input(s): "TSH" in the last 4320 hours.  Urine Culture: No results for input(s): "LABURIN" in the last 48 hours.  Urine Studies:   Recent Labs   Lab 10/23/23  0222   COLORU Yellow   APPEARANCEUA Clear   PHUR 7.0   SPECGRAV 1.020   PROTEINUA Negative   GLUCUA 4+*   KETONESU Negative   BILIRUBINUA Negative   OCCULTUA Negative   NITRITE Negative   UROBILINOGEN Negative   LEUKOCYTESUR Negative   RBCUA 2   WBCUA 0   BACTERIA Negative   SQUAMEPITHEL 0   HYALINECASTS 0.00*       Significant Imaging: I have reviewed all pertinent " imaging results/findings within the past 24 hours.    CT Head Without Contrast    Result Date: 10/23/2023   ADDENDUM #1 THIS REPORT CONTAINS FINDINGS THAT MAY BE CRITICAL TO PATIENT CARE: Impression discussed with Dr. JUD SWAIN  by Dr. Imelda Valencia on 10/23/2023 3:07 AM CDT .  The results were acknowledged and understood. Electronically signed by:  Imelda Valencia MD  10/23/2023 03:53 AM CDT Workstation: [id number]  ORIGINAL REPORT EXAM: CT Head Without Intravenous Contrast CLINICAL HISTORY: The patient is 63 years old and is Male; cardiac arrest TECHNIQUE: Axial computed tomography images of the head/brain without intravenous contrast.  Sagittal and coronal reformatted images were created and reviewed.  This CT exam was performed using one or more of the following dose reduction techniques:  automated exposure control, adjustment of the mA and/or kV according to patient size, and/or use of iterative reconstruction technique. COMPARISON: No relevant prior studies available. FINDINGS: BRAIN:  There is apparent diffuse lack of gray-white matter differentiation. This suggests diffuse cerebral edema. No obvious focal acute infarct. No intracranial hemorrhage visualized. No midline shift. The basilar cisterns are patent. VENTRICLES: No hydrocephalus. BONES/JOINTS:  No acute fracture. SOFT TISSUES:  No significant soft tissue swelling. SINUSES:  There is partial opacification of the maxillary and ethmoid sinuses. Small amount of fluid noted in the right maxillary sinus. MASTOID AIR CELLS:  Unremarkable.  No mastoid effusion. TUBES, LINES AND DEVICES:  There is a loop within the enteric tube at the level of the pharynx.  Partially visualized endotracheal tube. IMPRESSION: 1.  Apparent diffuse loss of gray-white matter differentiation, indicating diffuse cerebral edema. This is likely due to global hypoxic/anoxic injury. 2. There is a loop within the enteric tube at the level of the pharynx. Consider repositioning.  Electronically signed by:  Imelda Valencia MD  10/23/2023 02:58 AM CDT Workstation: [id number]    X-Ray Chest AP Portable    Result Date: 10/23/2023  EXAM: XR Chest, 1 View CLINICAL HISTORY: cardiac arrest. TECHNIQUE: Frontal radiograph of the chest. COMPARISON: 6/7/2016 FINDINGS: Lungs:  Bilateral airspace opacities are noted with perihilar predominance. No dense consolidation. Lung volumes are low. Pleural space:  No sizeable pleural effusion or pneumothorax. Heart:  Prominent cardiac silhouette, magnified in this projection. Mediastinum:  Mediastinum unremarkable given low lung volumes, rotation, and supine positioning. Bones/joints:  No acute osseous abnormality. Degenerative changes. Tubes, lines and devices:  Gastric tube tip in the distal esophagus.  Endotracheal tube tip 1.4 cm above the yuko.  Overlying monitor leads and wires. Upper abdomen:  Gastric distention partially visible. IMPRESSION: 1.  Bilateral pulmonary opacities, possibly pneumonia, aspiration pneumonia or noncardiogenic edema. 2. Gastric tube tip in the distal esophagus.  Suggest advancement by about 12 cm with reassessment. 3.  Endotracheal tube tip 1.4 cm above the yuko. Electronically signed by:  Rhonda Kebede MD  10/23/2023 03:05 AM CDT Workstation: [id number]    CT Abdomen Pelvis With Contrast    Result Date: 10/23/2023  EXAM: CT Abdomen and Pelvis With Intravenous Contrast CLINICAL HISTORY: CARDIAC ARREST. Abnormal stool TECHNIQUE: CT images of the abdomen and pelvis with intravenous contrast. Axial, coronal and sagittal images.  This CT exam was performed using one or more of the following dose reduction techniques:  automated exposure control, adjustment of the mA and/or kV according to patient size, and/or use of iterative reconstruction technique. COMPARISON: No relevant prior studies available. FINDINGS: Artifacts:  Motion artifact somewhat limits evaluation. Lung bases:  Airspace opacities, probably in the perihilar lower  lobes. Mild interstitial prominence. ABDOMEN: Liver:  No concerning focal lesion. Gallbladder and bile ducts:  Contracted gallbladder. No definite calcified gallstones. No significant biliary dilatation. Pancreas:  No pathologic process. Spleen:  No pathologic process. Adrenals:  No pathologic process. Kidneys and ureters:  No pathologic process. Stomach and bowel:  Gastric distention.  No significantly dilated loops of bowel, definite bowel wall thickening, or localized inflammation. Fluid is noted in the distal small bowel and throughout the colon. PELVIS: Appendix:  No findings to suggest acute appendicitis. Bladder:  Bladder decompressed by Loya catheter. Reproductive:  Unremarkable as visualized. ABDOMEN and PELVIS: Intraperitoneal space:  No free air.  No significant free fluid. Bones/joints:  No acute skeletal abnormality. Degenerative changes. Soft tissues:  Fat-containing left inguinal hernia without CT evidence of complication. Vasculature:  Unremarkable.  No abdominal aortic aneurysm. Lymph nodes:  No pathologically enlarged lymph nodes. Tubes, lines and devices:  Gastric tube tip in the distal esophagus. IMPRESSION: 1.  Prominent small bowel and colonic fluid, nonspecific finding which can be seen with enteritis. 2.  Gastric distention. Gastric tube tip in the distal esophagus. 3.  Bilateral airspace opacities, possibly asymmetric edema, pneumonia or aspiration pneumonia. Electronically signed by:  Rhonda Kebede MD  10/23/2023 03:00 AM CDT Workstation: [id number]       Assessment/Plan:     Active Hospital Problems    Diagnosis    *Out-of-hospital VFib cardiopulmonary arrest    Acute respiratory failure with hypoxia and hypercarbia, on mechanical ventilation     Type 2 diabetes mellitus with hyperglycemia    JULIA (acute kidney injury)    High anion gap metabolic acidosis    Macrocytosis    Leucocytosis    Lactic acidosis    LBBB (left bundle branch block)    Hypokalemia    Troponin I  above reference range    Enteritis    Anoxic brain injury    Mild persistent asthma without complication    Gastroesophageal reflux disease    Essential hypertension    Hyperlipidemia     Plan:  Admit inpatient, ICU, continuous cardiac telemetry monitoring  Continue oral intubation with mechanical ventilation  Ventilator precaution  P.r.n. ABG and chest x-ray, repeat chest x-ray in a.m.   Pantoprazole 40 mg IV b.i.d. for prophylaxis, also concern for possible upper GI bleed   Empiric piperacillin/tazobactam for possible aspiration and enteritis   40 mEq IV potassium supplementation, add on magnesium level with VFib   Start amiodarone without bolus given VFib arrest  Complete transthoracic echocardiogram ordered  Continue to trend high sensitivity troponin   Continue to trend lactic acid q.4 hours  Stool studies including stool WBC, stool culture, GI PCR pathogen panel  Pancultures submitted including tracheal aspirate, UA with reflex urine culture, blood culture   Insulin sliding scale with Accu-Cheks, as needed hypoglycemic measures   Patient currently off Levophed, continue to monitor blood pressure   Consult for PICC line for IV access   Repeat 12 lead EKG now   Mechanical DVT prophylaxis, hold off chemoprophylaxis with concern for bloody output from OGT  Electrolytes sliding scale repletion  Trending labs closely   Cardiology consulted, VFib cardiac arrest   Critical Care/Pulmonary consulted, cardiac arrest, vent management   Neurology consulted, anoxic brain injury  Further plan as per clinical course  Patient remains critically ill with guarded prognosis, high risk for decline and decompensation      Critical care time spent including chart review, review of laboratory and radiological findings, patient encounter, discussion with staff,  and note preparation 54 minutes.    VTE Risk Mitigation (From admission, onward)         Ordered     Place sequential compression device  Until discontinued          10/23/23 0358     IP VTE HIGH RISK PATIENT  Once         10/23/23 0358     Place sequential compression device  Until discontinued         10/23/23 0358                               Yadira Corona MD  Department of Hospital Medicine  [facility] - Emergency Dept    COMPLETED  Family history is reviewed and has not changed   Pertinent information:

## 2023-10-24 VITALS
DIASTOLIC BLOOD PRESSURE: 67 MMHG | OXYGEN SATURATION: 95 % | HEIGHT: 73 IN | SYSTOLIC BLOOD PRESSURE: 149 MMHG | RESPIRATION RATE: 28 BRPM | BODY MASS INDEX: 30.74 KG/M2 | HEART RATE: 129 BPM | WEIGHT: 231.94 LBS | TEMPERATURE: 100 F

## 2023-10-24 LAB
ALBUMIN SERPL BCP-MCNC: 3.1 G/DL (ref 3.5–5.2)
ALBUMIN SERPL BCP-MCNC: 3.3 G/DL (ref 3.5–5.2)
ALBUMIN SERPL BCP-MCNC: 3.4 G/DL (ref 3.5–5.2)
ALBUMIN SERPL BCP-MCNC: 3.5 G/DL (ref 3.5–5.2)
ALLENS TEST: ABNORMAL
ALP SERPL-CCNC: 45 U/L (ref 55–135)
ALP SERPL-CCNC: 48 U/L (ref 55–135)
ALP SERPL-CCNC: 55 U/L (ref 55–135)
ALP SERPL-CCNC: 61 U/L (ref 55–135)
ALT SERPL W/O P-5'-P-CCNC: 301 U/L (ref 10–44)
ALT SERPL W/O P-5'-P-CCNC: 332 U/L (ref 10–44)
ALT SERPL W/O P-5'-P-CCNC: 407 U/L (ref 10–44)
ALT SERPL W/O P-5'-P-CCNC: 572 U/L (ref 10–44)
AMYLASE SERPL-CCNC: 208 U/L (ref 20–110)
AMYLASE SERPL-CCNC: 241 U/L (ref 20–110)
AMYLASE SERPL-CCNC: 288 U/L (ref 20–110)
AMYLASE SERPL-CCNC: 306 U/L (ref 20–110)
ANION GAP SERPL CALC-SCNC: 14 MMOL/L (ref 8–16)
ANION GAP SERPL CALC-SCNC: 15 MMOL/L (ref 8–16)
ANION GAP SERPL CALC-SCNC: 16 MMOL/L (ref 8–16)
ANION GAP SERPL CALC-SCNC: 17 MMOL/L (ref 8–16)
APTT PPP: 29.1 SEC (ref 21–32)
APTT PPP: 29.6 SEC (ref 21–32)
APTT PPP: 32.4 SEC (ref 21–32)
APTT PPP: 33.1 SEC (ref 21–32)
AST SERPL-CCNC: 516 U/L (ref 10–40)
AST SERPL-CCNC: 523 U/L (ref 10–40)
AST SERPL-CCNC: 554 U/L (ref 10–40)
AST SERPL-CCNC: 620 U/L (ref 10–40)
BASOPHILS # BLD AUTO: 0.04 K/UL (ref 0–0.2)
BASOPHILS # BLD AUTO: 0.04 K/UL (ref 0–0.2)
BASOPHILS # BLD AUTO: 0.05 K/UL (ref 0–0.2)
BASOPHILS # BLD AUTO: 0.07 K/UL (ref 0–0.2)
BASOPHILS NFR BLD: 0.2 % (ref 0–1.9)
BASOPHILS NFR BLD: 0.3 % (ref 0–1.9)
BILIRUB DIRECT SERPL-MCNC: 0.1 MG/DL (ref 0.1–0.3)
BILIRUB DIRECT SERPL-MCNC: 0.1 MG/DL (ref 0.1–0.3)
BILIRUB DIRECT SERPL-MCNC: 0.2 MG/DL (ref 0.1–0.3)
BILIRUB DIRECT SERPL-MCNC: 0.2 MG/DL (ref 0.1–0.3)
BILIRUB SERPL-MCNC: 0.5 MG/DL (ref 0.1–1)
BILIRUB SERPL-MCNC: 0.6 MG/DL (ref 0.1–1)
BILIRUB SERPL-MCNC: 0.7 MG/DL (ref 0.1–1)
BILIRUB SERPL-MCNC: 0.7 MG/DL (ref 0.1–1)
BUN SERPL-MCNC: 42 MG/DL (ref 8–23)
BUN SERPL-MCNC: 45 MG/DL (ref 8–23)
BUN SERPL-MCNC: 47 MG/DL (ref 8–23)
BUN SERPL-MCNC: 48 MG/DL (ref 8–23)
CA-I BLDV-SCNC: 0.89 MMOL/L (ref 1.06–1.42)
CALCIUM SERPL-MCNC: 7.1 MG/DL (ref 8.7–10.5)
CALCIUM SERPL-MCNC: 7.2 MG/DL (ref 8.7–10.5)
CALCIUM SERPL-MCNC: 7.6 MG/DL (ref 8.7–10.5)
CALCIUM SERPL-MCNC: 7.6 MG/DL (ref 8.7–10.5)
CHLORIDE SERPL-SCNC: 100 MMOL/L (ref 95–110)
CHLORIDE SERPL-SCNC: 101 MMOL/L (ref 95–110)
CHLORIDE SERPL-SCNC: 96 MMOL/L (ref 95–110)
CHLORIDE SERPL-SCNC: 97 MMOL/L (ref 95–110)
CO2 SERPL-SCNC: 20 MMOL/L (ref 23–29)
CO2 SERPL-SCNC: 22 MMOL/L (ref 23–29)
CO2 SERPL-SCNC: 23 MMOL/L (ref 23–29)
CO2 SERPL-SCNC: 24 MMOL/L (ref 23–29)
CREAT SERPL-MCNC: 3.9 MG/DL (ref 0.5–1.4)
CREAT SERPL-MCNC: 4.4 MG/DL (ref 0.5–1.4)
CREAT SERPL-MCNC: 4.8 MG/DL (ref 0.5–1.4)
CREAT SERPL-MCNC: 5.4 MG/DL (ref 0.5–1.4)
DELSYS: ABNORMAL
DIFFERENTIAL METHOD: ABNORMAL
EOSINOPHIL # BLD AUTO: 0 K/UL (ref 0–0.5)
EOSINOPHIL NFR BLD: 0 % (ref 0–8)
ERYTHROCYTE [DISTWIDTH] IN BLOOD BY AUTOMATED COUNT: 12.5 % (ref 11.5–14.5)
ERYTHROCYTE [DISTWIDTH] IN BLOOD BY AUTOMATED COUNT: 12.6 % (ref 11.5–14.5)
ERYTHROCYTE [SEDIMENTATION RATE] IN BLOOD BY WESTERGREN METHOD: 26 MM/H
ERYTHROCYTE [SEDIMENTATION RATE] IN BLOOD BY WESTERGREN METHOD: 28 MM/H
EST. GFR  (NO RACE VARIABLE): 11.2 ML/MIN/1.73 M^2
EST. GFR  (NO RACE VARIABLE): 13 ML/MIN/1.73 M^2
EST. GFR  (NO RACE VARIABLE): 14.4 ML/MIN/1.73 M^2
EST. GFR  (NO RACE VARIABLE): 16.6 ML/MIN/1.73 M^2
ESTIMATED AVG GLUCOSE: 177 MG/DL (ref 68–131)
ESTIMATED AVG GLUCOSE: 180 MG/DL (ref 68–131)
FIO2: 100
FIO2: 50
FIO2: 50
FIO2: 80
GGT SERPL-CCNC: 51 U/L (ref 8–55)
GGT SERPL-CCNC: 53 U/L (ref 8–55)
GGT SERPL-CCNC: 56 U/L (ref 8–55)
GGT SERPL-CCNC: 57 U/L (ref 8–55)
GLUCOSE SERPL-MCNC: 207 MG/DL (ref 70–110)
GLUCOSE SERPL-MCNC: 286 MG/DL (ref 70–110)
GLUCOSE SERPL-MCNC: 297 MG/DL (ref 70–110)
GLUCOSE SERPL-MCNC: 298 MG/DL (ref 70–110)
GLUCOSE SERPL-MCNC: 332 MG/DL (ref 70–110)
HBA1C MFR BLD: 7.8 % (ref 4.5–6.2)
HBA1C MFR BLD: 7.9 % (ref 4.5–6.2)
HCO3 UR-SCNC: 18.9 MMOL/L (ref 24–28)
HCO3 UR-SCNC: 19.9 MMOL/L (ref 24–28)
HCO3 UR-SCNC: 20.3 MMOL/L (ref 24–28)
HCO3 UR-SCNC: 21.2 MMOL/L (ref 24–28)
HCO3 UR-SCNC: 22.7 MMOL/L (ref 24–28)
HCO3 UR-SCNC: 22.9 MMOL/L (ref 24–28)
HCT VFR BLD AUTO: 36.5 % (ref 40–54)
HCT VFR BLD AUTO: 36.8 % (ref 40–54)
HCT VFR BLD AUTO: 37.2 % (ref 40–54)
HCT VFR BLD AUTO: 39 % (ref 40–54)
HGB BLD-MCNC: 12.9 G/DL (ref 14–18)
HGB BLD-MCNC: 13 G/DL (ref 14–18)
HGB BLD-MCNC: 13 G/DL (ref 14–18)
HGB BLD-MCNC: 13.5 G/DL (ref 14–18)
IMM GRANULOCYTES # BLD AUTO: 0.1 K/UL (ref 0–0.04)
IMM GRANULOCYTES # BLD AUTO: 0.11 K/UL (ref 0–0.04)
IMM GRANULOCYTES # BLD AUTO: 0.15 K/UL (ref 0–0.04)
IMM GRANULOCYTES # BLD AUTO: 0.24 K/UL (ref 0–0.04)
IMM GRANULOCYTES NFR BLD AUTO: 0.4 % (ref 0–0.5)
IMM GRANULOCYTES NFR BLD AUTO: 0.5 % (ref 0–0.5)
IMM GRANULOCYTES NFR BLD AUTO: 0.6 % (ref 0–0.5)
IMM GRANULOCYTES NFR BLD AUTO: 1 % (ref 0–0.5)
INR PPP: 1.1 (ref 0.8–1.2)
INR PPP: 1.2 (ref 0.8–1.2)
LDH SERPL L TO P-CCNC: 1656 U/L (ref 110–260)
LDH SERPL L TO P-CCNC: 1682 U/L (ref 110–260)
LDH SERPL L TO P-CCNC: 1846 U/L (ref 110–260)
LDH SERPL L TO P-CCNC: 2245 U/L (ref 110–260)
LIPASE SERPL-CCNC: 42 U/L (ref 4–60)
LIPASE SERPL-CCNC: 46 U/L (ref 4–60)
LIPASE SERPL-CCNC: 61 U/L (ref 4–60)
LIPASE SERPL-CCNC: 98 U/L (ref 4–60)
LYMPHOCYTES # BLD AUTO: 1.1 K/UL (ref 1–4.8)
LYMPHOCYTES # BLD AUTO: 1.3 K/UL (ref 1–4.8)
LYMPHOCYTES # BLD AUTO: 2.1 K/UL (ref 1–4.8)
LYMPHOCYTES # BLD AUTO: 2.4 K/UL (ref 1–4.8)
LYMPHOCYTES NFR BLD: 4.6 % (ref 18–48)
LYMPHOCYTES NFR BLD: 5 % (ref 18–48)
LYMPHOCYTES NFR BLD: 9.1 % (ref 18–48)
LYMPHOCYTES NFR BLD: 9.9 % (ref 18–48)
MAGNESIUM SERPL-MCNC: 1.4 MG/DL (ref 1.6–2.6)
MAGNESIUM SERPL-MCNC: 1.4 MG/DL (ref 1.6–2.6)
MAGNESIUM SERPL-MCNC: 1.7 MG/DL (ref 1.6–2.6)
MAGNESIUM SERPL-MCNC: 2.1 MG/DL (ref 1.6–2.6)
MCH RBC QN AUTO: 31.1 PG (ref 27–31)
MCH RBC QN AUTO: 31.6 PG (ref 27–31)
MCH RBC QN AUTO: 31.9 PG (ref 27–31)
MCH RBC QN AUTO: 32.2 PG (ref 27–31)
MCHC RBC AUTO-ENTMCNC: 34.6 G/DL (ref 32–36)
MCHC RBC AUTO-ENTMCNC: 34.9 G/DL (ref 32–36)
MCHC RBC AUTO-ENTMCNC: 35.3 G/DL (ref 32–36)
MCHC RBC AUTO-ENTMCNC: 35.3 G/DL (ref 32–36)
MCV RBC AUTO: 90 FL (ref 82–98)
MCV RBC AUTO: 91 FL (ref 82–98)
MIN VOL: 17.3
MODE: ABNORMAL
MONOCYTES # BLD AUTO: 0.9 K/UL (ref 0.3–1)
MONOCYTES # BLD AUTO: 0.9 K/UL (ref 0.3–1)
MONOCYTES # BLD AUTO: 1.1 K/UL (ref 0.3–1)
MONOCYTES # BLD AUTO: 1.3 K/UL (ref 0.3–1)
MONOCYTES NFR BLD: 3.6 % (ref 4–15)
MONOCYTES NFR BLD: 4 % (ref 4–15)
MONOCYTES NFR BLD: 4.4 % (ref 4–15)
MONOCYTES NFR BLD: 5.5 % (ref 4–15)
NEUTROPHILS # BLD AUTO: 20.3 K/UL (ref 1.8–7.7)
NEUTROPHILS # BLD AUTO: 20.5 K/UL (ref 1.8–7.7)
NEUTROPHILS # BLD AUTO: 21.2 K/UL (ref 1.8–7.7)
NEUTROPHILS # BLD AUTO: 23 K/UL (ref 1.8–7.7)
NEUTROPHILS NFR BLD: 83.4 % (ref 38–73)
NEUTROPHILS NFR BLD: 86.7 % (ref 38–73)
NEUTROPHILS NFR BLD: 89.7 % (ref 38–73)
NEUTROPHILS NFR BLD: 90.7 % (ref 38–73)
NRBC BLD-RTO: 0 /100 WBC
PCO2 BLDA: 30.1 MMHG (ref 35–45)
PCO2 BLDA: 33.8 MMHG (ref 35–45)
PCO2 BLDA: 34.6 MMHG (ref 35–45)
PCO2 BLDA: 38.4 MMHG (ref 35–45)
PCO2 BLDA: 39.5 MMHG (ref 35–45)
PCO2 BLDA: 41.3 MMHG (ref 35–45)
PEEP: 0
PEEP: 0
PEEP: 8
PH SMN: 7.34 [PH] (ref 7.35–7.45)
PH SMN: 7.35 [PH] (ref 7.35–7.45)
PH SMN: 7.38 [PH] (ref 7.35–7.45)
PH SMN: 7.41 [PH] (ref 7.35–7.45)
PHOSPHATE SERPL-MCNC: 2.6 MG/DL (ref 2.7–4.5)
PHOSPHATE SERPL-MCNC: 4.4 MG/DL (ref 2.7–4.5)
PHOSPHATE SERPL-MCNC: 5 MG/DL (ref 2.7–4.5)
PHOSPHATE SERPL-MCNC: 5.1 MG/DL (ref 2.7–4.5)
PIP: 33
PLATELET # BLD AUTO: 158 K/UL (ref 150–450)
PLATELET # BLD AUTO: 176 K/UL (ref 150–450)
PLATELET # BLD AUTO: 186 K/UL (ref 150–450)
PLATELET # BLD AUTO: 220 K/UL (ref 150–450)
PMV BLD AUTO: 10.1 FL (ref 9.2–12.9)
PMV BLD AUTO: 10.3 FL (ref 9.2–12.9)
PO2 BLDA: 55 MMHG (ref 80–100)
PO2 BLDA: 72 MMHG (ref 80–100)
PO2 BLDA: 83 MMHG (ref 80–100)
PO2 BLDA: 84 MMHG (ref 80–100)
PO2 BLDA: 85 MMHG (ref 80–100)
PO2 BLDA: 98 MMHG (ref 80–100)
POC BE: -2 MMOL/L
POC BE: -3 MMOL/L
POC BE: -5 MMOL/L
POC BE: -6 MMOL/L
POC SATURATED O2: 88 % (ref 95–100)
POC SATURATED O2: 93 % (ref 95–100)
POC SATURATED O2: 96 % (ref 95–100)
POC SATURATED O2: 98 % (ref 95–100)
POC TCO2: 20 MMOL/L (ref 23–27)
POC TCO2: 21 MMOL/L (ref 23–27)
POC TCO2: 21 MMOL/L (ref 23–27)
POC TCO2: 22 MMOL/L (ref 23–27)
POC TCO2: 24 MMOL/L (ref 23–27)
POC TCO2: 24 MMOL/L (ref 23–27)
POTASSIUM SERPL-SCNC: 4 MMOL/L (ref 3.5–5.1)
POTASSIUM SERPL-SCNC: 4 MMOL/L (ref 3.5–5.1)
POTASSIUM SERPL-SCNC: 4.1 MMOL/L (ref 3.5–5.1)
POTASSIUM SERPL-SCNC: 4.2 MMOL/L (ref 3.5–5.1)
PROT SERPL-MCNC: 5.1 G/DL (ref 6–8.4)
PROT SERPL-MCNC: 5.2 G/DL (ref 6–8.4)
PROT SERPL-MCNC: 5.5 G/DL (ref 6–8.4)
PROT SERPL-MCNC: 5.7 G/DL (ref 6–8.4)
PROTHROMBIN TIME: 12.7 SEC (ref 9–12.5)
PROTHROMBIN TIME: 13.2 SEC (ref 9–12.5)
PROTHROMBIN TIME: 13.4 SEC (ref 9–12.5)
PROTHROMBIN TIME: 13.4 SEC (ref 9–12.5)
RBC # BLD AUTO: 4.01 M/UL (ref 4.6–6.2)
RBC # BLD AUTO: 4.08 M/UL (ref 4.6–6.2)
RBC # BLD AUTO: 4.12 M/UL (ref 4.6–6.2)
RBC # BLD AUTO: 4.34 M/UL (ref 4.6–6.2)
SAMPLE: ABNORMAL
SITE: ABNORMAL
SODIUM SERPL-SCNC: 135 MMOL/L (ref 136–145)
SODIUM SERPL-SCNC: 136 MMOL/L (ref 136–145)
SODIUM SERPL-SCNC: 137 MMOL/L (ref 136–145)
SODIUM SERPL-SCNC: 137 MMOL/L (ref 136–145)
SP02: 99
VT: 640
WBC # BLD AUTO: 23.39 K/UL (ref 3.9–12.7)
WBC # BLD AUTO: 23.4 K/UL (ref 3.9–12.7)
WBC # BLD AUTO: 24.54 K/UL (ref 3.9–12.7)
WBC # BLD AUTO: 25.59 K/UL (ref 3.9–12.7)

## 2023-10-24 PROCEDURE — 82803 BLOOD GASES ANY COMBINATION: CPT

## 2023-10-24 PROCEDURE — 99900026 HC AIRWAY MAINTENANCE (STAT)

## 2023-10-24 PROCEDURE — 84100 ASSAY OF PHOSPHORUS: CPT | Mod: 91

## 2023-10-24 PROCEDURE — 83735 ASSAY OF MAGNESIUM: CPT | Mod: 91

## 2023-10-24 PROCEDURE — 82330 ASSAY OF CALCIUM: CPT

## 2023-10-24 PROCEDURE — 27000221 HC OXYGEN, UP TO 24 HOURS

## 2023-10-24 PROCEDURE — 85025 COMPLETE CBC W/AUTO DIFF WBC: CPT | Mod: 91

## 2023-10-24 PROCEDURE — 94761 N-INVAS EAR/PLS OXIMETRY MLT: CPT

## 2023-10-24 PROCEDURE — 99900035 HC TECH TIME PER 15 MIN (STAT)

## 2023-10-24 PROCEDURE — 82977 ASSAY OF GGT: CPT

## 2023-10-24 PROCEDURE — 36415 COLL VENOUS BLD VENIPUNCTURE: CPT

## 2023-10-24 PROCEDURE — 83615 LACTATE (LD) (LDH) ENZYME: CPT

## 2023-10-24 PROCEDURE — 85730 THROMBOPLASTIN TIME PARTIAL: CPT | Mod: 91

## 2023-10-24 PROCEDURE — 82248 BILIRUBIN DIRECT: CPT

## 2023-10-24 PROCEDURE — 37799 UNLISTED PX VASCULAR SURGERY: CPT

## 2023-10-24 PROCEDURE — 83690 ASSAY OF LIPASE: CPT

## 2023-10-24 PROCEDURE — 94640 AIRWAY INHALATION TREATMENT: CPT

## 2023-10-24 PROCEDURE — 82248 BILIRUBIN DIRECT: CPT | Mod: 91

## 2023-10-24 PROCEDURE — 63600175 PHARM REV CODE 636 W HCPCS

## 2023-10-24 PROCEDURE — 25000003 PHARM REV CODE 250

## 2023-10-24 PROCEDURE — P9047 ALBUMIN (HUMAN), 25%, 50ML: HCPCS | Mod: JZ,JG

## 2023-10-24 PROCEDURE — 83690 ASSAY OF LIPASE: CPT | Mod: 91

## 2023-10-24 PROCEDURE — 85610 PROTHROMBIN TIME: CPT

## 2023-10-24 PROCEDURE — 25000242 PHARM REV CODE 250 ALT 637 W/ HCPCS

## 2023-10-24 PROCEDURE — 83735 ASSAY OF MAGNESIUM: CPT

## 2023-10-24 PROCEDURE — 80053 COMPREHEN METABOLIC PANEL: CPT | Mod: 91

## 2023-10-24 PROCEDURE — 80053 COMPREHEN METABOLIC PANEL: CPT

## 2023-10-24 PROCEDURE — 84100 ASSAY OF PHOSPHORUS: CPT

## 2023-10-24 PROCEDURE — 94003 VENT MGMT INPAT SUBQ DAY: CPT

## 2023-10-24 PROCEDURE — 85730 THROMBOPLASTIN TIME PARTIAL: CPT

## 2023-10-24 PROCEDURE — 83615 LACTATE (LD) (LDH) ENZYME: CPT | Mod: 91

## 2023-10-24 PROCEDURE — 85610 PROTHROMBIN TIME: CPT | Mod: 91

## 2023-10-24 PROCEDURE — 82150 ASSAY OF AMYLASE: CPT

## 2023-10-24 PROCEDURE — 82977 ASSAY OF GGT: CPT | Mod: 91

## 2023-10-24 PROCEDURE — 82150 ASSAY OF AMYLASE: CPT | Mod: 91

## 2023-10-24 RX ORDER — SODIUM BICARBONATE 1 MEQ/ML
100 SYRINGE (ML) INTRAVENOUS ONCE
Status: COMPLETED | OUTPATIENT
Start: 2023-10-24 | End: 2023-10-24

## 2023-10-24 RX ORDER — CALCIUM GLUCONATE 20 MG/ML
1 INJECTION, SOLUTION INTRAVENOUS
Status: COMPLETED | OUTPATIENT
Start: 2023-10-24 | End: 2023-10-24

## 2023-10-24 RX ORDER — ALBUMIN HUMAN 250 G/1000ML
25 SOLUTION INTRAVENOUS ONCE
Status: COMPLETED | OUTPATIENT
Start: 2023-10-24 | End: 2023-10-24

## 2023-10-24 RX ORDER — MAGNESIUM SULFATE HEPTAHYDRATE 40 MG/ML
2 INJECTION, SOLUTION INTRAVENOUS ONCE
Status: COMPLETED | OUTPATIENT
Start: 2023-10-24 | End: 2023-10-24

## 2023-10-24 RX ORDER — ALBUTEROL SULFATE 90 UG/1
2 AEROSOL, METERED RESPIRATORY (INHALATION) EVERY 8 HOURS
Status: DISCONTINUED | OUTPATIENT
Start: 2023-10-25 | End: 2023-10-24 | Stop reason: HOSPADM

## 2023-10-24 RX ORDER — SODIUM BICARBONATE 1 MEQ/ML
50 SYRINGE (ML) INTRAVENOUS ONCE
Status: COMPLETED | OUTPATIENT
Start: 2023-10-24 | End: 2023-10-24

## 2023-10-24 RX ORDER — DOPAMINE HYDROCHLORIDE 160 MG/100ML
0-20 INJECTION, SOLUTION INTRAVENOUS CONTINUOUS
Status: DISCONTINUED | OUTPATIENT
Start: 2023-10-24 | End: 2023-10-24

## 2023-10-24 RX ORDER — DOPAMINE HYDROCHLORIDE 320 MG/100ML
0-20 INJECTION, SOLUTION INTRAVENOUS CONTINUOUS
Status: DISCONTINUED | OUTPATIENT
Start: 2023-10-24 | End: 2023-10-24 | Stop reason: HOSPADM

## 2023-10-24 RX ORDER — FUROSEMIDE 10 MG/ML
60 INJECTION INTRAMUSCULAR; INTRAVENOUS ONCE
Status: COMPLETED | OUTPATIENT
Start: 2023-10-24 | End: 2023-10-24

## 2023-10-24 RX ORDER — MANNITOL 250 MG/ML
25 INJECTION, SOLUTION INTRAVENOUS ONCE
Status: COMPLETED | OUTPATIENT
Start: 2023-10-24 | End: 2023-10-24

## 2023-10-24 RX ADMIN — IPRATROPIUM BROMIDE 0.5 MG: 0.5 SOLUTION RESPIRATORY (INHALATION) at 11:10

## 2023-10-24 RX ADMIN — MAGNESIUM SULFATE HEPTAHYDRATE 2 G: 40 INJECTION, SOLUTION INTRAVENOUS at 01:10

## 2023-10-24 RX ADMIN — MANNITOL 25 G: 250 INJECTION, SOLUTION INTRAVENOUS at 10:10

## 2023-10-24 RX ADMIN — IPRATROPIUM BROMIDE 0.5 MG: 0.5 SOLUTION RESPIRATORY (INHALATION) at 03:10

## 2023-10-24 RX ADMIN — MAGNESIUM SULFATE HEPTAHYDRATE 2 G: 40 INJECTION, SOLUTION INTRAVENOUS at 10:10

## 2023-10-24 RX ADMIN — SODIUM BICARBONATE 100 MEQ: 84 INJECTION, SOLUTION INTRAVENOUS at 01:10

## 2023-10-24 RX ADMIN — LEVALBUTEROL HYDROCHLORIDE 1.25 MG: 1.25 SOLUTION RESPIRATORY (INHALATION) at 07:10

## 2023-10-24 RX ADMIN — ALBUMIN (HUMAN) 25 G: 12.5 SOLUTION INTRAVENOUS at 08:10

## 2023-10-24 RX ADMIN — PIPERACILLIN AND TAZOBACTAM 3.38 G: 3; .375 INJECTION, POWDER, LYOPHILIZED, FOR SOLUTION INTRAVENOUS; PARENTERAL at 05:10

## 2023-10-24 RX ADMIN — HYDROCORTISONE SODIUM SUCCINATE 100 MG: 100 INJECTION, POWDER, FOR SOLUTION INTRAMUSCULAR; INTRAVENOUS at 07:10

## 2023-10-24 RX ADMIN — IPRATROPIUM BROMIDE 0.5 MG: 0.5 SOLUTION RESPIRATORY (INHALATION) at 07:10

## 2023-10-24 RX ADMIN — CALCIUM GLUCONATE 1 G: 20 INJECTION, SOLUTION INTRAVENOUS at 08:10

## 2023-10-24 RX ADMIN — HYDROCORTISONE SODIUM SUCCINATE 100 MG: 100 INJECTION, POWDER, FOR SOLUTION INTRAMUSCULAR; INTRAVENOUS at 03:10

## 2023-10-24 RX ADMIN — BUMETANIDE 1 MG/HR: 0.25 INJECTION, SOLUTION INTRAMUSCULAR; INTRAVENOUS at 04:10

## 2023-10-24 RX ADMIN — HEPARIN SODIUM 5000 UNITS: 5000 INJECTION INTRAVENOUS; SUBCUTANEOUS at 01:10

## 2023-10-24 RX ADMIN — MUPIROCIN 1 G: 20 OINTMENT TOPICAL at 08:10

## 2023-10-24 RX ADMIN — DOPAMINE HYDROCHLORIDE 3 MCG/KG/MIN: 160 INJECTION, SOLUTION INTRAVENOUS at 09:10

## 2023-10-24 RX ADMIN — LEVALBUTEROL HYDROCHLORIDE 1.25 MG: 1.25 SOLUTION RESPIRATORY (INHALATION) at 03:10

## 2023-10-24 RX ADMIN — HEPARIN SODIUM 5000 UNITS: 5000 INJECTION INTRAVENOUS; SUBCUTANEOUS at 05:10

## 2023-10-24 RX ADMIN — DEXTROSE: 5 SOLUTION INTRAVENOUS at 12:10

## 2023-10-24 RX ADMIN — SODIUM BICARBONATE 50 MEQ: 84 INJECTION, SOLUTION INTRAVENOUS at 08:10

## 2023-10-24 RX ADMIN — AMIODARONE HYDROCHLORIDE 0.5 MG/MIN: 1.8 INJECTION, SOLUTION INTRAVENOUS at 08:10

## 2023-10-24 RX ADMIN — FUROSEMIDE 60 MG: 10 INJECTION, SOLUTION INTRAMUSCULAR; INTRAVENOUS at 10:10

## 2023-10-24 RX ADMIN — DEXTROSE 0.3 MCG/KG/MIN: 5 SOLUTION INTRAVENOUS at 12:10

## 2023-10-24 RX ADMIN — CHLOROTHIAZIDE SODIUM 200 MG: 500 INJECTION, POWDER, LYOPHILIZED, FOR SOLUTION INTRAVENOUS at 10:10

## 2023-10-24 RX ADMIN — PIPERACILLIN AND TAZOBACTAM 3.38 G: 3; .375 INJECTION, POWDER, LYOPHILIZED, FOR SOLUTION INTRAVENOUS; PARENTERAL at 01:10

## 2023-10-24 RX ADMIN — PIPERACILLIN AND TAZOBACTAM 3.38 G: 3; .375 INJECTION, POWDER, LYOPHILIZED, FOR SOLUTION INTRAVENOUS; PARENTERAL at 09:10

## 2023-10-24 RX ADMIN — CALCIUM GLUCONATE 1 G: 20 INJECTION, SOLUTION INTRAVENOUS at 09:10

## 2023-10-24 NOTE — CARE UPDATE
10/23/23 2017   Patient Assessment/Suction   Level of Consciousness (AVPU) unresponsive   Respiratory Effort Unlabored   Expansion/Accessory Muscles/Retractions no retractions;no use of accessory muscles;expansion symmetric   All Lung Fields Breath Sounds coarse   Rhythm/Pattern, Respiratory assisted mechanically   Cough Frequency unable to cough   Cough Type nonproductive   Skin Integrity   $ Wound Care Tech Time 15 min   Area Observed Upper lip;Lower lip;Corner lip;Cheek   Skin Appearance without discoloration   PRE-TX-O2   Device (Oxygen Therapy) ventilator   $ Is the patient on Low Flow Oxygen? Yes   Oxygen Concentration (%) 80   SpO2 100 %   Pulse Oximetry Type Continuous   $ Pulse Oximetry - Multiple Charge Pulse Oximetry - Multiple   Pulse 106   Resp (!) 28   BP (!) 140/66   Positioning   Head of Bed (HOB) Positioning HOB lowered   Aerosol Therapy   $ Aerosol Therapy Charges Aerosol Treatment   Daily Review of Necessity (SVN) completed   Respiratory Treatment Status (SVN) given   Treatment Route (SVN) in-line;oxygen;ventilator   Patient Position (SVN) HOB not elevated due to medical condition   Post Treatment Assessment (SVN) breath sounds unchanged   Signs of Intolerance (SVN) none   Breath Sounds Post-Respiratory Treatment   Throughout All Fields Post-Treatment All Fields   Throughout All Fields Post-Treatment aeration increased        Airway - Non-Surgical 10/23/23 0059 Endotracheal Tube   Placement Date/Time: 10/23/23 0059   Present Prior to Hospital Arrival?: No  Method of Intubation: Direct laryngoscopy  Inserted by: MD  Staff/Resident Name(s): maritza  Airway Device: Endotracheal Tube  Mask Ventilation: Easy  Blade: Elizabeth #4  ...   Secured at 25 cm   Measured At Lips   Secured Location Left   Secured by Commercial tube kolb   Position of ETT in xRay In good position   Site Condition Cool;Dry   Status Intact;Secured;Patent   Site Assessment Clean;No bleeding   Airway Safety   Is Ambu Bag and  Mask with Patient? Yes, Adult Ambu Bag and Mask   Suction set is at the bedside? Yes   ETT Size 8   Vent Select   Conventional Vent Y   Charged w/in last 24h YES   Preset Conventional Ventilator Settings   Vent ID [id number]   Vent Type    Ventilation Type VC   Vent Mode A/C   Humidity HME   Set Rate 28 BPM   Vt Set 640 mL   PEEP/CPAP 0 cmH20   Pressure Support 0 cmH20   Waveform RAMP   Peak Flow 85 L/min   Plateau Set/Insp. Hold (sec) 0   Trigger Sensitivity Flow/I-Trigger 2 L/min   Patient Ventilator Parameters   Resp Rate Total 28 br/min   Peak Airway Pressure 33 cmH20   Mean Airway Pressure 13 cmH20   Plateau Pressure 0 cmH20   Exhaled Vt 669 mL   Total Ve 18.7 L/m   I:E Ratio Measured 1:1.60   Conventional Ventilator Alarms   Alarms On Y   Resp Rate High Alarm 50 br/min   Press High Alarm 60 cmH2O   Apnea Rate 30   Apnea Volume (mL) 1 mL   Apnea Oxygen Concentration  100   Apnea Flow Rate (L/min) 70   T Apnea 20 sec(s)   Ready to Wean/Extubation Screen   FIO2<=50 (chart decimal) (!) 0.8   MV<16L (chart vol.) (!) 18.7   PEEP <=8 (chart #) 0   Ready to Wean Parameters   F/VT Ratio<105 (RSBI) (!) 41.85   Respiratory Evaluation   $ Care Plan Tech Time 15 min   $ Eval/Re-eval Charges Re-evaluation

## 2023-10-25 LAB
BACTERIA SPEC AEROBE CULT: NORMAL
BACTERIA SPEC AEROBE CULT: NORMAL
GRAM STN SPEC: NORMAL

## 2023-10-26 LAB — BACTERIA UR CULT: NO GROWTH

## 2023-10-28 LAB
BACTERIA BLD CULT: NORMAL